# Patient Record
Sex: FEMALE | Race: WHITE | NOT HISPANIC OR LATINO | Employment: OTHER | URBAN - NONMETROPOLITAN AREA
[De-identification: names, ages, dates, MRNs, and addresses within clinical notes are randomized per-mention and may not be internally consistent; named-entity substitution may affect disease eponyms.]

---

## 2018-03-29 ENCOUNTER — TRANSCRIBE ORDERS (OUTPATIENT)
Dept: ADMINISTRATIVE | Facility: HOSPITAL | Age: 63
End: 2018-03-29

## 2018-03-29 ENCOUNTER — HOSPITAL ENCOUNTER (OUTPATIENT)
Dept: RADIOLOGY | Facility: HOSPITAL | Age: 63
Discharge: HOME | End: 2018-03-29
Payer: COMMERCIAL

## 2018-03-29 DIAGNOSIS — R50.9 FEVER, UNSPECIFIED FEVER CAUSE: ICD-10-CM

## 2018-03-29 DIAGNOSIS — R05.9 COUGH: ICD-10-CM

## 2018-03-29 DIAGNOSIS — R50.9 FEVER, UNSPECIFIED FEVER CAUSE: Primary | ICD-10-CM

## 2018-03-29 PROCEDURE — 71046 X-RAY EXAM CHEST 2 VIEWS: CPT

## 2018-08-24 LAB — HCV AB SER-ACNC: NON REACTIVE

## 2019-07-18 DIAGNOSIS — I10 HYPERTENSION, UNSPECIFIED TYPE: Primary | ICD-10-CM

## 2019-07-18 RX ORDER — LISINOPRIL AND HYDROCHLOROTHIAZIDE 20; 12.5 MG/1; MG/1
1 TABLET ORAL DAILY
COMMUNITY
End: 2019-07-18 | Stop reason: SDUPTHER

## 2019-08-20 ENCOUNTER — TELEPHONE (OUTPATIENT)
Dept: FAMILY MEDICINE CLINIC | Facility: CLINIC | Age: 64
End: 2019-08-20

## 2019-08-20 ENCOUNTER — OFFICE VISIT (OUTPATIENT)
Dept: FAMILY MEDICINE CLINIC | Facility: CLINIC | Age: 64
End: 2019-08-20
Payer: COMMERCIAL

## 2019-08-20 VITALS
HEART RATE: 56 BPM | DIASTOLIC BLOOD PRESSURE: 70 MMHG | SYSTOLIC BLOOD PRESSURE: 134 MMHG | HEIGHT: 62 IN | WEIGHT: 138 LBS | BODY MASS INDEX: 25.4 KG/M2 | OXYGEN SATURATION: 98 %

## 2019-08-20 DIAGNOSIS — I10 ESSENTIAL HYPERTENSION, BENIGN: ICD-10-CM

## 2019-08-20 DIAGNOSIS — Z12.39 SCREENING FOR BREAST CANCER: Primary | ICD-10-CM

## 2019-08-20 DIAGNOSIS — E78.5 HYPERLIPIDEMIA, UNSPECIFIED HYPERLIPIDEMIA TYPE: ICD-10-CM

## 2019-08-20 DIAGNOSIS — F41.1 GENERALIZED ANXIETY DISORDER: ICD-10-CM

## 2019-08-20 PROCEDURE — 3075F SYST BP GE 130 - 139MM HG: CPT | Performed by: FAMILY MEDICINE

## 2019-08-20 PROCEDURE — 1036F TOBACCO NON-USER: CPT | Performed by: FAMILY MEDICINE

## 2019-08-20 PROCEDURE — 99213 OFFICE O/P EST LOW 20 MIN: CPT | Performed by: FAMILY MEDICINE

## 2019-08-20 PROCEDURE — 3008F BODY MASS INDEX DOCD: CPT | Performed by: FAMILY MEDICINE

## 2019-08-20 RX ORDER — METOPROLOL TARTRATE 50 MG/1
TABLET, FILM COATED ORAL
COMMUNITY
End: 2020-02-07 | Stop reason: CLARIF

## 2019-08-20 RX ORDER — IBUPROFEN 200 MG
600 CAPSULE ORAL DAILY
COMMUNITY
End: 2020-02-20 | Stop reason: ALTCHOICE

## 2019-08-20 RX ORDER — LORAZEPAM 0.5 MG/1
TABLET ORAL
COMMUNITY
Start: 2016-08-16 | End: 2019-08-20 | Stop reason: SDUPTHER

## 2019-08-20 RX ORDER — LORAZEPAM 0.5 MG/1
0.5 TABLET ORAL EVERY 8 HOURS PRN
Qty: 270 TABLET | Refills: 1 | Status: SHIPPED | OUTPATIENT
Start: 2019-08-20 | End: 2020-08-20 | Stop reason: SDUPTHER

## 2019-08-20 RX ORDER — LISINOPRIL AND HYDROCHLOROTHIAZIDE 25; 20 MG/1; MG/1
TABLET ORAL
COMMUNITY
End: 2020-02-20 | Stop reason: SDUPTHER

## 2019-08-20 RX ORDER — METOPROLOL SUCCINATE 50 MG/1
50 TABLET, EXTENDED RELEASE ORAL DAILY
Refills: 3 | COMMUNITY
Start: 2019-08-14 | End: 2020-02-07 | Stop reason: SDUPTHER

## 2019-08-20 RX ORDER — ASCORBIC ACID 250 MG
500 TABLET ORAL 2 TIMES DAILY
COMMUNITY

## 2019-08-20 RX ORDER — METAPROTERENOL SULFATE 10 MG
1 TABLET ORAL DAILY
COMMUNITY
End: 2021-09-24 | Stop reason: ALTCHOICE

## 2019-08-20 RX ORDER — CALCIUM CITRATE/VITAMIN D3 315MG-6.25
1 TABLET ORAL DAILY
COMMUNITY

## 2019-08-20 NOTE — PROGRESS NOTES
Assessment/Plan:    Essential hypertension, benign  Patient has hypertension  Her blood pressure is well controlled  I recommended no change with her current regiment  Hyperlipidemia  Patient had a fasting lipid panel done  This is currently pending  I will need to contact the patient with these results  Generalized anxiety disorder  Patient has generalized anxiety disorder  I refilled the patient's lorazepam 0 5 mg every 8 hours as needed for anxiety  Diagnoses and all orders for this visit:    Screening for breast cancer  -     Mammo diagnostic bilateral w cad; Future    Hyperlipidemia, unspecified hyperlipidemia type    Essential hypertension, benign    Generalized anxiety disorder  -     LORazepam (ATIVAN) 0 5 mg tablet; Take 1 tablet (0 5 mg total) by mouth every 8 (eight) hours as needed for anxiety    Other orders  -     ascorbic acid (VITAMIN C) 250 MG tablet; Take 500 mg by mouth 2 (two) times a day  -     ASPIRIN 81 PO; Take by mouth  -     Calcium 600 MG tablet; Take by mouth  -     Calcium Citrate-Vitamin D3 315-250 MG-UNIT TABS; Take 1 tablet by mouth daily  -     Multiple Vitamins-Minerals (CENTRUM SILVER 50+WOMEN) TABS; Take by mouth  -     Omega-3 Fatty Acids (OMEGA-3 FISH OIL) 500 MG CAPS; Take 2 g by mouth daily  -     Discontinue: LORazepam (ATIVAN) 0 5 mg tablet  -     metoprolol succinate (TOPROL-XL) 50 mg 24 hr tablet; Take 50 mg by mouth daily  -     metoprolol tartrate (LOPRESSOR) 50 mg tablet; Take by mouth  -     lisinopril-hydrochlorothiazide (PRINZIDE,ZESTORETIC) 20-25 MG per tablet; Take by mouth        The [facility] prescription drug monitoring program was queried  There were no red flags  Safe to proceed  Subjective:      Patient ID: Peter Hollins is a 59 y o  female  This patient is a 71-year-old white female who presents to the office today for her routine checkup  The patient is doing well and has no complaints    She is looking forward to retiring in a year  She reports compliance with her medication  She is exercising regularly  She walks but she also uses an elliptical regularly  She is up-to-date with her colonoscopy  She is due for a mammogram   She had blood work done in anticipation of this visit today  Unfortunately, these results are currently pending  The following portions of the patient's history were reviewed and updated as appropriate: allergies, current medications, past family history, past medical history, past social history, past surgical history and problem list     Review of Systems   Eyes: Negative for visual disturbance  Cardiovascular: Negative for chest pain, palpitations and leg swelling  Gastrointestinal: Negative for abdominal distention, abdominal pain, anal bleeding, blood in stool, constipation, diarrhea and nausea  Objective:      /70   Pulse 56   Ht 5' 1 5" (1 562 m)   Wt 62 6 kg (138 lb)   SpO2 98%   BMI 25 65 kg/m²          Physical Exam   Constitutional: She appears well-developed and well-nourished  No distress  HENT:   Head: Normocephalic and atraumatic  Right Ear: External ear normal    Left Ear: External ear normal    Mouth/Throat: Oropharynx is clear and moist  No oropharyngeal exudate  Tympanic membranes are clear   Eyes: Pupils are equal, round, and reactive to light  Conjunctivae are normal  No scleral icterus  Neck: Neck supple  No tracheal deviation present  No thyromegaly present  Cardiovascular: Normal rate, regular rhythm and normal heart sounds  Exam reveals no gallop and no friction rub  No murmur heard  Pulmonary/Chest: Effort normal and breath sounds normal  No stridor  No respiratory distress  She has no wheezes  She has no rales  Abdominal: Soft  Bowel sounds are normal  She exhibits no distension and no mass  There is no tenderness  There is no rebound and no guarding  Lymphadenopathy:     She has no cervical adenopathy  Skin: She is not diaphoretic  Vitals reviewed  extremities:  Without cyanosis, clubbing, or edema

## 2019-08-20 NOTE — ASSESSMENT & PLAN NOTE
Patient has generalized anxiety disorder  I refilled the patient's lorazepam 0 5 mg every 8 hours as needed for anxiety

## 2019-08-20 NOTE — ASSESSMENT & PLAN NOTE
Patient had a fasting lipid panel done  This is currently pending  I will need to contact the patient with these results

## 2019-08-20 NOTE — TELEPHONE ENCOUNTER
Ani Carver   Phone Number: [phone]             08/20/19 8:50 AM     As a result of outreach to the external facility it was discovered that the patient did not have the requested Hepatitis C  completed/given  We have spoke with the external facility [facility]) and the patient was never seen  This message will now be closed  Thank you   Josselyn Neumann    Previous Messages      ----- Message -----   From: Ruddy Ware MA   Sent: 8/20/2019   8:10 AM EDT   To: [facility] [place]   Subject: anthraciate PC                                   I have reviewed both our old [address] and need your assistance in locating Hepatitis C Screening  Per Patient, testing was done by [facility] on 2018 Thank you

## 2019-08-26 ENCOUNTER — DOCUMENTATION (OUTPATIENT)
Dept: FAMILY MEDICINE CLINIC | Facility: CLINIC | Age: 64
End: 2019-08-26

## 2019-08-26 NOTE — PROGRESS NOTES
BLOOD SUGAR 99 ( NORMAL )  T CHOL 193 (<200 )  TRIGLYCERIDES 125 (<150 )  LDL CHOL 119 (<100 )  THYROID NORMAL  REST OF BLOOD WORK NORMAL    LEFT MESSAGE FOR PT TO CALL OFFICE FOR RESULTS    PT AWARE

## 2019-10-11 RX ORDER — LISINOPRIL AND HYDROCHLOROTHIAZIDE 20; 12.5 MG/1; MG/1
TABLET ORAL
Qty: 135 TABLET | Refills: 3 | Status: SHIPPED | OUTPATIENT
Start: 2019-10-11 | End: 2020-09-30

## 2019-12-19 DIAGNOSIS — R52 PAIN: Primary | ICD-10-CM

## 2019-12-19 RX ORDER — CELECOXIB 200 MG/1
1 CAPSULE ORAL DAILY
COMMUNITY
End: 2019-12-19 | Stop reason: SDUPTHER

## 2019-12-19 RX ORDER — CELECOXIB 200 MG/1
200 CAPSULE ORAL DAILY
Qty: 30 CAPSULE | Refills: 5 | Status: SHIPPED | OUTPATIENT
Start: 2019-12-19 | End: 2021-02-22 | Stop reason: ALTCHOICE

## 2020-02-07 DIAGNOSIS — I10 ESSENTIAL HYPERTENSION, BENIGN: Primary | ICD-10-CM

## 2020-02-07 RX ORDER — METOPROLOL SUCCINATE 50 MG/1
50 TABLET, EXTENDED RELEASE ORAL DAILY
Qty: 90 TABLET | Refills: 3 | Status: SHIPPED | OUTPATIENT
Start: 2020-02-07 | End: 2021-02-01

## 2020-02-20 ENCOUNTER — OFFICE VISIT (OUTPATIENT)
Dept: FAMILY MEDICINE CLINIC | Facility: CLINIC | Age: 65
End: 2020-02-20
Payer: COMMERCIAL

## 2020-02-20 VITALS
DIASTOLIC BLOOD PRESSURE: 78 MMHG | BODY MASS INDEX: 26.13 KG/M2 | OXYGEN SATURATION: 100 % | HEART RATE: 69 BPM | WEIGHT: 138.4 LBS | SYSTOLIC BLOOD PRESSURE: 130 MMHG | HEIGHT: 61 IN

## 2020-02-20 DIAGNOSIS — F41.1 GENERALIZED ANXIETY DISORDER: ICD-10-CM

## 2020-02-20 DIAGNOSIS — Z11.59 ENCOUNTER FOR HEPATITIS C SCREENING TEST FOR LOW RISK PATIENT: ICD-10-CM

## 2020-02-20 DIAGNOSIS — I10 ESSENTIAL HYPERTENSION, BENIGN: Primary | ICD-10-CM

## 2020-02-20 DIAGNOSIS — E78.5 HYPERLIPIDEMIA, UNSPECIFIED HYPERLIPIDEMIA TYPE: ICD-10-CM

## 2020-02-20 PROCEDURE — 3078F DIAST BP <80 MM HG: CPT | Performed by: FAMILY MEDICINE

## 2020-02-20 PROCEDURE — 99213 OFFICE O/P EST LOW 20 MIN: CPT | Performed by: FAMILY MEDICINE

## 2020-02-20 PROCEDURE — 3075F SYST BP GE 130 - 139MM HG: CPT | Performed by: FAMILY MEDICINE

## 2020-02-20 PROCEDURE — 3008F BODY MASS INDEX DOCD: CPT | Performed by: FAMILY MEDICINE

## 2020-02-20 PROCEDURE — 1036F TOBACCO NON-USER: CPT | Performed by: FAMILY MEDICINE

## 2020-02-20 NOTE — PATIENT INSTRUCTIONS
Low-Sodium Diet   AMBULATORY CARE:   A low-sodium diet  limits foods that are high in sodium (salt)  You will need to follow a low-sodium diet if you have high blood pressure, kidney disease, or heart failure  You may also need to follow this diet if you have a condition that is causing your body to retain (hold) extra fluid  You may need to limit the amount of sodium you eat to 1,500 mg  Ask your healthcare provider how much sodium you can have each day  How to use food labels to choose foods that are low in sodium:  Read food labels to find the amount of sodium they contain  The amount of sodium is listed in milligrams (mg)  The % Daily Value (DV) column tells you how much of your daily needs are met by 1 serving of the food for each nutrient listed  Choose foods that have less than 5% of the DV of sodium  These foods are considered low in sodium  Foods that have 20% or more of the DV of sodium are considered high in sodium  Some food labels may also list any of the following terms that tell you about the sodium content in the food:  · Sodium-free:  Less than 5 mg in each serving    · Very low sodium:  35 mg of sodium or less in each serving    · Low sodium:  140 mg of sodium or less in each serving    · Reduced sodium: At least 25% less sodium in each serving than the regular type    · Light in sodium:  50% less sodium in each serving    · Unsalted or no added salt:  No extra salt is added during processing (the food may still contain sodium)  Foods to avoid:  Salty foods are high in sodium   You should avoid the following:  · Processed foods:      ¨ Mixes for cornbread, biscuits, cake, and pudding     ¨ Instant foods, such as potatoes, cereals, noodles, and rice     ¨ Packaged foods, such as bread stuffing, rice and pasta mixes, snack dip mixes, and macaroni and cheese     ¨ Canned foods, such as canned vegetables, soups, broths, sauces, and vegetable or tomato juice    ¨ Snack foods, such as salted chips, popcorn, pretzels, pork rinds, salted crackers, and salted nuts    ¨ Frozen foods, such as dinners, entrees, vegetables with sauces, and breaded meats    ¨ Sauerkraut, pickled vegetables, and other foods prepared in brine    · Meats and cheeses:      ¨ Smoked or cured meat, such as corned beef, jasso, ham, hot dogs, and sausage    ¨ Canned meats or spreads, such as potted meats, sardines, anchovies, and imitation seafood    ¨ Deli or lunch meats, such as bologna, ham, turkey, and roast beef    ¨ Processed cheese, such as American cheese and cheese spreads    · Condiments, sauces, and seasonings:      ¨ Salt (¼ teaspoon of salt contains 575 mg of sodium)    ¨ Seasonings made with salt, such as garlic salt, celery salt, onion salt, and seasoned salt    ¨ Regular soy sauce, barbecue sauce, teriyaki sauce, steak sauce, Worcestershire sauce, and most flavored vinegars    ¨ Canned gravy and mixes     ¨ Regular condiments, such as mustard, ketchup, and salad dressings    ¨ Pickles and olives    ¨ Meat tenderizers and monosodium glutamate (MSG)  Foods to include:  Read the food label to find the exact amount of sodium in each serving  · Bread and cereal:  Try to choose breads with less than 80 mg of sodium per serving  ¨ Bread, roll, margaret, tortilla, or unsalted crackers  ¨ Ready-to-eat cereals with less than 5% DV of sodium (examples include shredded wheat and puffed rice)    ¨ Pasta    · Vegetables and fruits:      ¨ Unsalted fresh, frozen, or canned vegetables    ¨ Fresh, frozen, or canned fruits    ¨ Fruit juice    · Dairy:  One serving has about 150 mg of sodium  ¨ Milk, all types    ¨ Yogurt    ¨ Hard cheese, such as cheddar, Swiss, [employer], or mozzarella    · Meat and other protein foods:  Some raw meats may have added sodium       ¨ Plain meats, fish, and poultry     ¨ Eggs    · Other foods:      ¨ Homemade pudding    ¨ Unsalted nuts, popcorn, or pretzels    ¨ Unsalted butter or margarine  Ways to decrease sodium:   · Add spices and herbs to foods instead of salt during cooking  Use salt-free seasonings to add flavor to foods  Examples include onion powder, garlic powder, basil, tang powder, paprika, and parsley  Try lemon or lime juice or vinegar to give foods a tart flavor  Use hot peppers, pepper, or cayenne pepper to add a spicy flavor to foods  · Do not keep a salt shaker at your kitchen table  This may help keep you from adding salt to food at the table  It may take time to get used to enjoying the natural flavor of food instead of adding salt  Talk to your healthcare provider before you use salt substitutes  Some salt substitutes have a high amount of potassium and need to be avoided if you have kidney disease  · Choose low-sodium foods at restaurants  Meals from restaurants are often high in sodium  Some restaurants have nutrition information on the menu that tells you the amount of sodium in their foods  If possible, ask for your food to be prepared with less, or no salt  · Shop for unsalted or low-sodium foods and snacks at the grocery store  Examples include unsalted or low-sodium broths, soups, and canned vegetables  Choose fresh or frozen vegetables instead  Choose unsalted nuts or seeds or fresh fruits or vegetables as snacks  Read food labels and choose salt-free, very low-sodium, or low-sodium foods  © 2017 [address] [employer] Information is for End User's use only and may not be sold, redistributed or otherwise used for commercial purposes  All illustrations and images included in CareNotes® are the copyrighted property of A D A M [employer] Inc  or Leonel Oakes  The above information is an  only  It is not intended as medical advice for individual conditions or treatments  Talk to your doctor, nurse or pharmacist before following any medical regimen to see if it is safe and effective for you

## 2020-02-20 NOTE — PROGRESS NOTES
Assessment/Plan:    Essential hypertension, benign  Patient's blood pressure is well controlled on her current regiment  I recommended no change with her current medication  She did discuss perhaps changing to metoprolol tartrate at some point in the future because it is cheaper  She thinks maybe after she retires  Apparently, a pharmacist had discuss this with her and told her to discuss it with me  She tells me she does not want to make any changes now because she can't afford the Toprol XL and she finds it works well for her  I ordered a BMP    Hyperlipidemia  Patient has hyperlipidemia  I reviewed her last lipid panel with her  I plan a repeat lipid panel in 6 months    Generalized anxiety disorder  Currently stable on lorazepam as needed       Diagnoses and all orders for this visit:    Essential hypertension, benign  -     Basic metabolic panel; Future    Encounter for hepatitis C screening test for low risk patient  -     Cancel: Hepatitis C antibody; Future    Hyperlipidemia, unspecified hyperlipidemia type    Generalized anxiety disorder          Subjective:      Patient ID: Onesimo Salinas is a 62 y o  female  This is a 61-year-old white female who presents to the office today for her routine checkup  The patient is doing well and has no complaints  She is exercising every morning  She tries to watch her diet  She is taking her medication as prescribed  She feels well in general       The following portions of the patient's history were reviewed and updated as appropriate: allergies, current medications, past family history, past medical history, past social history, past surgical history and problem list     Review of Systems   Respiratory: Negative for cough, shortness of breath and wheezing  Cardiovascular: Negative for chest pain, palpitations and leg swelling     Gastrointestinal: Negative for abdominal distention, abdominal pain, blood in stool, constipation, diarrhea, nausea and vomiting  Musculoskeletal: Negative for arthralgias and gait problem  Objective:      /78 (BP Location: Left arm, Patient Position: Sitting, Cuff Size: Adult)   Pulse 69   Ht 5' 1" (1 549 m)   Wt 62 8 kg (138 lb 6 4 oz)   SpO2 100%   BMI 26 15 kg/m²          Physical Exam   Constitutional: She appears well-developed and well-nourished  No distress  HENT:   Head: Normocephalic and atraumatic  Right Ear: External ear normal    Left Ear: External ear normal    Mouth/Throat: Oropharynx is clear and moist  No oropharyngeal exudate  Tympanic membranes are normal in appearance   Eyes: Pupils are equal, round, and reactive to light  Conjunctivae are normal  No scleral icterus  Neck: Neck supple  No tracheal deviation present  No thyromegaly present  No carotid bruits are appreciated   Cardiovascular: Normal rate, regular rhythm and normal heart sounds  Exam reveals no gallop and no friction rub  No murmur heard  Pulmonary/Chest: Effort normal and breath sounds normal  No stridor  No respiratory distress  She has no wheezes  She has no rales  Abdominal: Soft  Bowel sounds are normal  She exhibits no distension and no mass  There is no tenderness  There is no guarding  There was no organomegaly noted   Lymphadenopathy:     She has no cervical adenopathy  Psychiatric: She has a normal mood and affect  Her behavior is normal  Judgment and thought content normal    Vitals reviewed      extremities:  Without cyanosis, clubbing, or edema

## 2020-02-21 NOTE — ASSESSMENT & PLAN NOTE
Patient's blood pressure is well controlled on her current regiment  I recommended no change with her current medication  She did discuss perhaps changing to metoprolol tartrate at some point in the future because it is cheaper  She thinks maybe after she retires  Apparently, a pharmacist had discuss this with her and told her to discuss it with me  She tells me she does not want to make any changes now because she can't afford the Toprol XL and she finds it works well for her    I ordered a BMP

## 2020-02-21 NOTE — ASSESSMENT & PLAN NOTE
Patient has hyperlipidemia  I reviewed her last lipid panel with her    I plan a repeat lipid panel in 6 months

## 2020-02-24 ENCOUNTER — TELEPHONE (OUTPATIENT)
Dept: FAMILY MEDICINE CLINIC | Facility: CLINIC | Age: 65
End: 2020-02-24

## 2020-02-24 NOTE — TELEPHONE ENCOUNTER
Pt has lab work (BMP) wants to know if you want more drawn ex  Lipid etc   Please advise,  We need to print out the script for her

## 2020-08-19 LAB — HCV AB SER-ACNC: NON REACTIVE

## 2020-08-20 ENCOUNTER — OFFICE VISIT (OUTPATIENT)
Dept: FAMILY MEDICINE CLINIC | Facility: CLINIC | Age: 65
End: 2020-08-20
Payer: COMMERCIAL

## 2020-08-20 VITALS
WEIGHT: 138.4 LBS | DIASTOLIC BLOOD PRESSURE: 78 MMHG | HEIGHT: 61 IN | BODY MASS INDEX: 26.13 KG/M2 | SYSTOLIC BLOOD PRESSURE: 138 MMHG | TEMPERATURE: 98.5 F | HEART RATE: 61 BPM | OXYGEN SATURATION: 98 %

## 2020-08-20 DIAGNOSIS — I10 ESSENTIAL HYPERTENSION, BENIGN: ICD-10-CM

## 2020-08-20 DIAGNOSIS — E78.5 HYPERLIPIDEMIA, UNSPECIFIED HYPERLIPIDEMIA TYPE: ICD-10-CM

## 2020-08-20 DIAGNOSIS — Z23 IMMUNIZATION DUE: ICD-10-CM

## 2020-08-20 DIAGNOSIS — Z00.00 ANNUAL PHYSICAL EXAM: Primary | ICD-10-CM

## 2020-08-20 DIAGNOSIS — R10.13 EPIGASTRIC PAIN: ICD-10-CM

## 2020-08-20 DIAGNOSIS — H00.14 CHALAZION OF LEFT UPPER EYELID: ICD-10-CM

## 2020-08-20 DIAGNOSIS — F41.1 GENERALIZED ANXIETY DISORDER: ICD-10-CM

## 2020-08-20 PROCEDURE — 1036F TOBACCO NON-USER: CPT | Performed by: FAMILY MEDICINE

## 2020-08-20 PROCEDURE — 99396 PREV VISIT EST AGE 40-64: CPT | Performed by: FAMILY MEDICINE

## 2020-08-20 PROCEDURE — 90750 HZV VACC RECOMBINANT IM: CPT | Performed by: FAMILY MEDICINE

## 2020-08-20 PROCEDURE — 90471 IMMUNIZATION ADMIN: CPT | Performed by: FAMILY MEDICINE

## 2020-08-20 PROCEDURE — 3075F SYST BP GE 130 - 139MM HG: CPT | Performed by: FAMILY MEDICINE

## 2020-08-20 PROCEDURE — 3008F BODY MASS INDEX DOCD: CPT | Performed by: FAMILY MEDICINE

## 2020-08-20 PROCEDURE — 3725F SCREEN DEPRESSION PERFORMED: CPT | Performed by: FAMILY MEDICINE

## 2020-08-20 PROCEDURE — 3078F DIAST BP <80 MM HG: CPT | Performed by: FAMILY MEDICINE

## 2020-08-20 RX ORDER — LORAZEPAM 0.5 MG/1
0.5 TABLET ORAL EVERY 8 HOURS PRN
Qty: 270 TABLET | Refills: 1 | Status: SHIPPED | OUTPATIENT
Start: 2020-08-20 | End: 2021-02-17

## 2020-08-20 NOTE — PATIENT INSTRUCTIONS
Low-Sodium Diet   AMBULATORY CARE:   A low-sodium diet  limits foods that are high in sodium (salt)  You will need to follow a low-sodium diet if you have high blood pressure, kidney disease, or heart failure  You may also need to follow this diet if you have a condition that is causing your body to retain (hold) extra fluid  You may need to limit the amount of sodium you eat to 1,500 mg  Ask your healthcare provider how much sodium you can have each day  How to use food labels to choose foods that are low in sodium:  Read food labels to find the amount of sodium they contain  The amount of sodium is listed in milligrams (mg)  The % Daily Value (DV) column tells you how much of your daily needs are met by 1 serving of the food for each nutrient listed  Choose foods that have less than 5% of the DV of sodium  These foods are considered low in sodium  Foods that have 20% or more of the DV of sodium are considered high in sodium  Some food labels may also list any of the following terms that tell you about the sodium content in the food:  · Sodium-free:  Less than 5 mg in each serving    · Very low sodium:  35 mg of sodium or less in each serving    · Low sodium:  140 mg of sodium or less in each serving    · Reduced sodium: At least 25% less sodium in each serving than the regular type    · Light in sodium:  50% less sodium in each serving    · Unsalted or no added salt:  No extra salt is added during processing (the food may still contain sodium)  Foods to avoid:  Salty foods are high in sodium   You should avoid the following:  · Processed foods:      ¨ Mixes for cornbread, biscuits, cake, and pudding     ¨ Instant foods, such as potatoes, cereals, noodles, and rice     ¨ Packaged foods, such as bread stuffing, rice and pasta mixes, snack dip mixes, and macaroni and cheese     ¨ Canned foods, such as canned vegetables, soups, broths, sauces, and vegetable or tomato juice    ¨ Snack foods, such as salted chips, popcorn, pretzels, pork rinds, salted crackers, and salted nuts    ¨ Frozen foods, such as dinners, entrees, vegetables with sauces, and breaded meats    ¨ Sauerkraut, pickled vegetables, and other foods prepared in brine    · Meats and cheeses:      ¨ Smoked or cured meat, such as corned beef, jasso, ham, hot dogs, and sausage    ¨ Canned meats or spreads, such as potted meats, sardines, anchovies, and imitation seafood    ¨ Deli or lunch meats, such as bologna, ham, turkey, and roast beef    ¨ Processed cheese, such as American cheese and cheese spreads    · Condiments, sauces, and seasonings:      ¨ Salt (¼ teaspoon of salt contains 575 mg of sodium)    ¨ Seasonings made with salt, such as garlic salt, celery salt, onion salt, and seasoned salt    ¨ Regular soy sauce, barbecue sauce, teriyaki sauce, steak sauce, Worcestershire sauce, and most flavored vinegars    ¨ Canned gravy and mixes     ¨ Regular condiments, such as mustard, ketchup, and salad dressings    ¨ Pickles and olives    ¨ Meat tenderizers and monosodium glutamate (MSG)  Foods to include:  Read the food label to find the exact amount of sodium in each serving  · Bread and cereal:  Try to choose breads with less than 80 mg of sodium per serving  ¨ Bread, roll, margaret, tortilla, or unsalted crackers  ¨ Ready-to-eat cereals with less than 5% DV of sodium (examples include shredded wheat and puffed rice)    ¨ Pasta    · Vegetables and fruits:      ¨ Unsalted fresh, frozen, or canned vegetables    ¨ Fresh, frozen, or canned fruits    ¨ Fruit juice    · Dairy:  One serving has about 150 mg of sodium  ¨ Milk, all types    ¨ Yogurt    ¨ Hard cheese, such as cheddar, Swiss, [employer], or mozzarella    · Meat and other protein foods:  Some raw meats may have added sodium       ¨ Plain meats, fish, and poultry     ¨ Eggs    · Other foods:      ¨ Homemade pudding    ¨ Unsalted nuts, popcorn, or pretzels    ¨ Unsalted butter or margarine  Ways to decrease sodium:   · Add spices and herbs to foods instead of salt during cooking  Use salt-free seasonings to add flavor to foods  Examples include onion powder, garlic powder, basil, tang powder, paprika, and parsley  Try lemon or lime juice or vinegar to give foods a tart flavor  Use hot peppers, pepper, or cayenne pepper to add a spicy flavor to foods  · Do not keep a salt shaker at your kitchen table  This may help keep you from adding salt to food at the table  It may take time to get used to enjoying the natural flavor of food instead of adding salt  Talk to your healthcare provider before you use salt substitutes  Some salt substitutes have a high amount of potassium and need to be avoided if you have kidney disease  · Choose low-sodium foods at restaurants  Meals from restaurants are often high in sodium  Some restaurants have nutrition information on the menu that tells you the amount of sodium in their foods  If possible, ask for your food to be prepared with less, or no salt  · Shop for unsalted or low-sodium foods and snacks at the grocery store  Examples include unsalted or low-sodium broths, soups, and canned vegetables  Choose fresh or frozen vegetables instead  Choose unsalted nuts or seeds or fresh fruits or vegetables as snacks  Read food labels and choose salt-free, very low-sodium, or low-sodium foods  © 2017 [address] [employer] Information is for End User's use only and may not be sold, redistributed or otherwise used for commercial purposes  All illustrations and images included in CareNotes® are the copyrighted property of [employer]  or Leonel Oakes  The above information is an  only  It is not intended as medical advice for individual conditions or treatments  Talk to your doctor, nurse or pharmacist before following any medical regimen to see if it is safe and effective for you

## 2020-08-20 NOTE — PROGRESS NOTES
Assessment/Plan:    Annual physical exam  Patient presented to the office today for her annual physical   The patient had blood work done just yesterday  I do not have her results yet  I told her I will contact her with the results when they become available  If she does not hear from me within a week, she should call me  She had done through [facility]  We have had issues getting are results from them in the past   Patient's blood pressure is reasonably well controlled  I recommended no change with her medication  The stye on her left eye is resolving  I have simply recommended she apply warm compresses to the affected area and it should completely resolved  It was almost not noticeable at this time  It started a week ago  I am concerned about the postprandial abdominal pain  It would be very unusual to start getting heartburn at 62years old  I am concerned about her gallbladder  I ordered an ultrasound of the abdomen to further evaluate this  Patient is going to get Shingrix vaccine today  She will have her 2nd dose when she returns in 6 months  Diagnoses and all orders for this visit:    Annual physical exam    Generalized anxiety disorder  -     LORazepam (ATIVAN) 0 5 mg tablet; Take 1 tablet (0 5 mg total) by mouth every 8 (eight) hours as needed for anxiety    Epigastric pain  -     US abdomen complete; Future    Chalazion of left upper eyelid    Essential hypertension, benign    Hyperlipidemia, unspecified hyperlipidemia type        The [place] prescription drug monitoring program was queried  There were no red flags  Safe to proceed  BMI Counseling: Body mass index is 26 15 kg/m²  The BMI is above normal  Nutrition recommendations include reducing portion sizes, decreasing overall calorie intake, 3-5 servings of fruits/vegetables daily, reducing fast food intake, consuming healthier snacks, decreasing soda and/or juice intake and moderation in carbohydrate intake   Exercise recommendations include exercising 3-5 times per week  Subjective:      Patient ID: Imer Johnson is a 62 y o  female  This 73-year-old white female presents to the office today for her annual physical   The patient is doing well and has no complaints  The patient is taking her medication as prescribed  She is retired now  She is walking 5 miles per day  In addition to this, she is using it an elliptical machine  She finds that her knee feels better now since she is able to do this exercise  Her orthopedic doctor believes this is secondary to quadriceps strengthening  The following portions of the patient's history were reviewed and updated as appropriate: allergies, current medications, past family history, past medical history, past social history, past surgical history and problem list     Review of Systems   Constitutional: Negative for activity change, appetite change and unexpected weight change  Eyes: Positive for pain (Reports a stye on the left upper eyelid)  Cardiovascular: Negative for chest pain, palpitations and leg swelling  Gastrointestinal: Positive for abdominal distention ( reports frequent belching with her abdominal pain) and abdominal pain ( reports postprandial abdominal pain  She tells me that this only started earlier this summer  She attributed to getting penicillin from the dentist   When she gets the pain, it actually last 2 or 3 days  She believed to be heartburn but it would not re)  Negative for constipation, diarrhea, nausea and vomiting  Objective:      /78 (BP Location: Left arm, Patient Position: Sitting, Cuff Size: Adult)   Pulse 61   Temp 98 5 °F (36 9 °C) (Temporal)   Ht 5' 1" (1 549 m)   Wt 62 8 kg (138 lb 6 4 oz)   SpO2 98%   BMI 26 15 kg/m²          Physical Exam  Vitals signs reviewed  HENT:      Head: Normocephalic and atraumatic        Right Ear: Tympanic membrane, ear canal and external ear normal       Left Ear: Tympanic membrane, ear canal and external ear normal       Mouth/Throat:      Mouth: Mucous membranes are moist       Pharynx: Oropharynx is clear  No oropharyngeal exudate or posterior oropharyngeal erythema  Eyes:      General: No scleral icterus  Right eye: No discharge  Left eye: No discharge  Conjunctiva/sclera: Conjunctivae normal       Pupils: Pupils are equal, round, and reactive to light  Comments: There is a resolving chalazion present on the left upper eyelid   Neck:      Musculoskeletal: Neck supple  Vascular: No carotid bruit  Comments: There is no thyromegaly noted  Cardiovascular:      Rate and Rhythm: Normal rate and regular rhythm  Heart sounds: Normal heart sounds  No murmur  No friction rub  No gallop  Pulmonary:      Effort: Pulmonary effort is normal  No respiratory distress  Breath sounds: Normal breath sounds  No stridor  No wheezing, rhonchi or rales  Abdominal:      General: Abdomen is flat  Bowel sounds are normal  There is no distension  Palpations: Abdomen is soft  There is no mass  Tenderness: There is no abdominal tenderness  There is no guarding  Lymphadenopathy:      Cervical: No cervical adenopathy  Psychiatric:         Mood and Affect: Mood normal          Behavior: Behavior normal          Thought Content:  Thought content normal          Judgment: Judgment normal        extremities:  Without cyanosis, clubbing, or edema

## 2020-08-20 NOTE — ASSESSMENT & PLAN NOTE
Patient presented to the office today for her annual physical   The patient had blood work done just yesterday  I do not have her results yet  I told her I will contact her with the results when they become available  If she does not hear from me within a week, she should call me  She had done through [facility]  We have had issues getting are results from them in the past   Patient's blood pressure is reasonably well controlled  I recommended no change with her medication  The stye on her left eye is resolving  I have simply recommended she apply warm compresses to the affected area and it should completely resolved  It was almost not noticeable at this time  It started a week ago  I am concerned about the postprandial abdominal pain  It would be very unusual to start getting heartburn at 62years old  I am concerned about her gallbladder  I ordered an ultrasound of the abdomen to further evaluate this  Patient is going to get Shingrix vaccine today  She will have her 2nd dose when she returns in 6 months

## 2020-08-26 ENCOUNTER — HOSPITAL ENCOUNTER (OUTPATIENT)
Dept: ULTRASOUND IMAGING | Facility: HOSPITAL | Age: 65
Discharge: HOME | End: 2020-08-26
Attending: FAMILY MEDICINE
Payer: COMMERCIAL

## 2020-08-26 DIAGNOSIS — R10.13 EPIGASTRIC PAIN: ICD-10-CM

## 2020-08-26 PROCEDURE — 76700 US EXAM ABDOM COMPLETE: CPT

## 2020-09-30 DIAGNOSIS — I10 HYPERTENSION, UNSPECIFIED TYPE: ICD-10-CM

## 2020-09-30 RX ORDER — LISINOPRIL AND HYDROCHLOROTHIAZIDE 20; 12.5 MG/1; MG/1
TABLET ORAL
Qty: 135 TABLET | Refills: 3 | Status: SHIPPED | OUTPATIENT
Start: 2020-09-30 | End: 2021-10-07 | Stop reason: SDUPTHER

## 2020-12-28 NOTE — ASSESSMENT & PLAN NOTE
Patient has hypertension  Her blood pressure is well controlled  I recommended no change with her current regiment  Donor Site Anesthesia Type: same as repair anesthesia

## 2021-01-31 DIAGNOSIS — I10 ESSENTIAL HYPERTENSION, BENIGN: ICD-10-CM

## 2021-02-01 RX ORDER — METOPROLOL SUCCINATE 50 MG/1
TABLET, EXTENDED RELEASE ORAL
Qty: 90 TABLET | Refills: 3 | Status: SHIPPED | OUTPATIENT
Start: 2021-02-01 | End: 2022-02-07 | Stop reason: SDUPTHER

## 2021-02-17 DIAGNOSIS — F41.1 GENERALIZED ANXIETY DISORDER: ICD-10-CM

## 2021-02-17 RX ORDER — LORAZEPAM 0.5 MG/1
0.5 TABLET ORAL EVERY 8 HOURS PRN
Qty: 270 TABLET | Refills: 1 | Status: SHIPPED | OUTPATIENT
Start: 2021-02-17 | End: 2022-03-25 | Stop reason: SDUPTHER

## 2021-02-17 NOTE — PROGRESS NOTES
The [place] prescription drug monitoring program was queried  There were no red flags  Safe to proceed

## 2021-02-22 ENCOUNTER — OFFICE VISIT (OUTPATIENT)
Dept: FAMILY MEDICINE CLINIC | Facility: CLINIC | Age: 66
End: 2021-02-22
Payer: COMMERCIAL

## 2021-02-22 VITALS
BODY MASS INDEX: 26.38 KG/M2 | WEIGHT: 139.7 LBS | HEIGHT: 61 IN | SYSTOLIC BLOOD PRESSURE: 150 MMHG | OXYGEN SATURATION: 97 % | HEART RATE: 63 BPM | DIASTOLIC BLOOD PRESSURE: 86 MMHG | TEMPERATURE: 98.4 F

## 2021-02-22 DIAGNOSIS — E78.5 HYPERLIPIDEMIA, UNSPECIFIED HYPERLIPIDEMIA TYPE: ICD-10-CM

## 2021-02-22 DIAGNOSIS — I10 ESSENTIAL HYPERTENSION, BENIGN: Primary | ICD-10-CM

## 2021-02-22 DIAGNOSIS — F41.1 GENERALIZED ANXIETY DISORDER: ICD-10-CM

## 2021-02-22 DIAGNOSIS — Z23 IMMUNIZATION DUE: Primary | ICD-10-CM

## 2021-02-22 PROCEDURE — 99213 OFFICE O/P EST LOW 20 MIN: CPT | Performed by: FAMILY MEDICINE

## 2021-02-22 PROCEDURE — 90471 IMMUNIZATION ADMIN: CPT

## 2021-02-22 PROCEDURE — 90750 HZV VACC RECOMBINANT IM: CPT

## 2021-02-22 RX ORDER — AMLODIPINE BESYLATE 5 MG/1
5 TABLET ORAL DAILY
Qty: 30 TABLET | Refills: 5 | Status: SHIPPED | OUTPATIENT
Start: 2021-02-22 | End: 2021-08-18 | Stop reason: SDUPTHER

## 2021-02-22 NOTE — PROGRESS NOTES
Assessment/Plan:    Essential hypertension, benign    Patient has hypertension which is not well controlled  I recheck her blood pressure myself and found to be 150/86  I started the patient on amlodipine 5 mg daily  She will continue the lisinopril -hydrochlorothiazide, dose unchanged for now  If blood pressure is really well controlled next time, perhaps I can discontinue the evening dose  Patient will also continue her beta-blocker therapy  I scheduled the patient a follow-up visit for 4 weeks to reassess the blood pressure  Continue exercise  Continue sodium restricted diet  Hyperlipidemia    Patient has hyperlipidemia  I reviewed the CBC and CMP she had done for her gastroenterologist   I ordered a fasting lipid panel    Generalized anxiety disorder   Patient has generalized anxiety disorder which is stable  She will continue lorazepam as needed       Diagnoses and all orders for this visit:    Essential hypertension, benign  -     amLODIPine (NORVASC) 5 mg tablet; Take 1 tablet (5 mg total) by mouth daily  -     TSH, 3rd generation with Free T4 reflex; Future    Hyperlipidemia, unspecified hyperlipidemia type  -     Lipid panel; Future    Generalized anxiety disorder  -     TSH, 3rd generation with Free T4 reflex; Future          Subjective:      Patient ID: Milagros Rowley is a 60 y o  female  This is a 81-year-old white female presents to the office today for her routine checkup  The patient does check her blood pressures at home  Generally, she gets much lower blood pressure readings at home that she does in the office  She tells me lately, her blood pressure readings have been high at home as well  She was worried about this  It was also high recently when she saw her gastroenterologist   She tells me when she 1st went air was 140/70 but then it kept going up  She tells me she has a family history of hypertension and her mother's blood pressure behaved precisely the same way    The patient exercises regularly and has been watching her sodium intake  The following portions of the patient's history were reviewed and updated as appropriate: allergies, current medications, past family history, past medical history, past social history, past surgical history and problem list     Review of Systems   Respiratory: Negative for cough, shortness of breath and wheezing  Cardiovascular: Negative for chest pain, palpitations and leg swelling  Gastrointestinal: Negative for abdominal distention, abdominal pain, blood in stool, constipation, diarrhea and nausea  Objective:      /86   Pulse 63   Temp 98 4 °F (36 9 °C) (Tympanic)   Ht 5' 1" (1 549 m)   Wt 63 4 kg (139 lb 11 2 oz)   SpO2 97%   BMI 26 40 kg/m²          Physical Exam  Vitals signs reviewed  Constitutional:       Comments: This is a 80-year-old white female who appears her stated age  She is in no apparent distress   HENT:      Head: Normocephalic and atraumatic  Right Ear: Tympanic membrane, ear canal and external ear normal       Left Ear: Tympanic membrane, ear canal and external ear normal       Mouth/Throat:      Mouth: Mucous membranes are moist       Pharynx: Oropharynx is clear  No oropharyngeal exudate or posterior oropharyngeal erythema  Eyes:      General: No scleral icterus  Right eye: No discharge  Left eye: No discharge  Conjunctiva/sclera: Conjunctivae normal       Pupils: Pupils are equal, round, and reactive to light  Neck:      Musculoskeletal: Neck supple  Vascular: No carotid bruit  Comments: There was no thyromegaly noted  Cardiovascular:      Rate and Rhythm: Normal rate and regular rhythm  Heart sounds: Normal heart sounds  No murmur  No friction rub  No gallop  Pulmonary:      Effort: Pulmonary effort is normal  No respiratory distress  Breath sounds: Normal breath sounds  No stridor  No wheezing, rhonchi or rales     Abdominal: General: Bowel sounds are normal  There is no distension  Palpations: Abdomen is soft  There is no mass  Tenderness: There is no abdominal tenderness  There is no guarding  Comments: No abdominal bruits  There was no hepatosplenomegaly   Lymphadenopathy:      Cervical: No cervical adenopathy  Psychiatric:         Mood and Affect: Mood normal          Behavior: Behavior normal          Thought Content:  Thought content normal          Judgment: Judgment normal

## 2021-02-22 NOTE — ASSESSMENT & PLAN NOTE
Patient has hypertension which is not well controlled  I recheck her blood pressure myself and found to be 150/86  I started the patient on amlodipine 5 mg daily  She will continue the lisinopril -hydrochlorothiazide, dose unchanged for now  If blood pressure is really well controlled next time, perhaps I can discontinue the evening dose  Patient will also continue her beta-blocker therapy  I scheduled the patient a follow-up visit for 4 weeks to reassess the blood pressure  Continue exercise  Continue sodium restricted diet

## 2021-02-22 NOTE — PATIENT INSTRUCTIONS
Low-Sodium Diet   AMBULATORY CARE:   A low-sodium diet  limits foods that are high in sodium (salt)  You will need to follow a low-sodium diet if you have high blood pressure, kidney disease, or heart failure  You may also need to follow this diet if you have a condition that is causing your body to retain (hold) extra fluid  You may need to limit the amount of sodium you eat in a day to 1,500 to 2,000 mg  Ask your healthcare provider how much sodium you can have each day  How to use food labels to choose foods that are low in sodium:  Read food labels to find the amount of sodium they contain  The amount of sodium is listed in milligrams (mg)  The % Daily Value (DV) column tells you how much of your daily needs are met by 1 serving of the food for each nutrient listed  Choose foods that have less than 5% of the DV of sodium  These foods are considered low in sodium  Foods that have 20% or more of the DV of sodium are considered high in sodium  Some food labels may also list any of the following terms that tell you about the sodium content in the food:  · Sodium-free:  Less than 5 mg in each serving    · Very low sodium:  35 mg of sodium or less in each serving    · Low sodium:  140 mg of sodium or less in each serving    · Reduced sodium: At least 25% less sodium in each serving than the regular type    · Light in sodium:  50% less sodium in each serving    · Unsalted or no added salt:  No extra salt is added during processing (the food may still contain sodium)     Foods to avoid:  Salty foods are high in sodium  You should avoid the following:  · Processed foods:      ? Mixes for cornbread, biscuits, cake, and pudding     ? Instant foods, such as potatoes, cereals, noodles, and rice     ? Packaged foods, such as bread stuffing, rice and pasta mixes, snack dip mixes, and macaroni and cheese     ? Canned foods, such as canned vegetables, soups, broths, sauces, and vegetable or tomato juice    ?  Snack foods, such as salted chips, popcorn, pretzels, pork rinds, salted crackers, and salted nuts    ? Frozen foods, such as dinners, entrees, vegetables with sauces, and breaded meats    ? Sauerkraut, pickled vegetables, and other foods prepared in brine    · Meats and cheeses:      ? Smoked or cured meat, such as corned beef, jasso, ham, hot dogs, and sausage    ? Canned meats or spreads, such as potted meats, sardines, anchovies, and imitation seafood    ? Deli or lunch meats, such as bologna, ham, turkey, and roast beef    ? Processed cheese, such as American cheese and cheese spreads    · Condiments, sauces, and seasonings:      ? Salt (¼ teaspoon of salt contains 575 mg of sodium)    ? Seasonings made with salt, such as garlic salt, celery salt, onion salt, and seasoned salt    ? Regular soy sauce, barbecue sauce, teriyaki sauce, steak sauce, Worcestershire sauce, and most flavored vinegars    ? Canned gravy and mixes     ? Regular condiments, such as mustard, ketchup, and salad dressings    ? Pickles and olives    ? Meat tenderizers and monosodium glutamate (MSG)    Foods to include:  Read the food label to find the exact amount of sodium in each serving  · Bread and cereal:  Try to choose breads with less than 80 mg of sodium per serving  ? Bread, roll, margaret, tortilla, or unsalted crackers  ? Ready-to-eat cereals with less than 5% DV of sodium (examples include shredded wheat and puffed rice)    ? Pasta    · Vegetables and fruits:      ? Unsalted fresh, frozen, or canned vegetables    ? Fresh, frozen, or canned fruits    ? Fruit juice    · Dairy:  One serving has about 150 mg of sodium  ? Milk, all types    ? Yogurt    ? Hard cheese, such as cheddar, Swiss, [employer], or mozzarella    · Meat and other protein foods:  Some raw meats may have added sodium  ? Plain meats, fish, and poultry     ? Eggs    · Other foods:      ? Homemade pudding    ? Unsalted nuts, popcorn, or pretzels    ?  Unsalted butter or margarine    Ways to decrease sodium:   · Add spices and herbs to foods instead of salt during cooking  Use salt-free seasonings to add flavor to foods  Examples include onion powder, garlic powder, basil, tang powder, paprika, and parsley  Try lemon or lime juice or vinegar to give foods a tart flavor  Use hot peppers, pepper, or cayenne pepper to add a spicy flavor  · Do not keep a salt shaker at your kitchen table  This may help keep you from adding salt to food at the table  A teaspoon of salt has 2,300 mg of sodium  It may take time to get used to enjoying the natural flavor of food instead of adding salt  Talk to your healthcare provider before you use salt substitutes  Some salt substitutes have a high amount of potassium and need to be avoided if you have kidney disease  · Choose low-sodium foods at restaurants  Meals from restaurants are often high in sodium  Some restaurants have nutrition information on the menu that tells you the amount of sodium in their foods  If possible, ask for your food to be prepared with less, or no salt  · Shop for unsalted or low-sodium foods and snacks at the grocery store  Examples include unsalted or low-sodium broths, soups, and canned vegetables  Choose fresh or frozen vegetables instead  Choose unsalted nuts or seeds or fresh fruits or vegetables as snacks  Read food labels and choose salt-free, very low-sodium, or low-sodium foods  © Copyright [address] Information is for End User's use only and may not be sold, redistributed or otherwise used for commercial purposes  All illustrations and images included in CareNotes® are the copyrighted property of [employer]  Inc  or [facility] Sean Gurrola   The above information is an  only  It is not intended as medical advice for individual conditions or treatments  Talk to your doctor, nurse or pharmacist before following any medical regimen to see if it is safe and effective for you

## 2021-02-23 NOTE — ASSESSMENT & PLAN NOTE
Patient has hyperlipidemia    I reviewed the CBC and CMP she had done for her gastroenterologist   I ordered a fasting lipid panel

## 2021-03-20 ENCOUNTER — APPOINTMENT (OUTPATIENT)
Dept: LAB | Facility: HOSPITAL | Age: 66
End: 2021-03-20
Attending: FAMILY MEDICINE
Payer: COMMERCIAL

## 2021-03-20 DIAGNOSIS — F41.1 GENERALIZED ANXIETY DISORDER: ICD-10-CM

## 2021-03-20 DIAGNOSIS — E78.5 HYPERLIPIDEMIA, UNSPECIFIED HYPERLIPIDEMIA TYPE: ICD-10-CM

## 2021-03-20 DIAGNOSIS — I10 ESSENTIAL HYPERTENSION, BENIGN: ICD-10-CM

## 2021-03-20 LAB
CHOLEST SERPL-MCNC: 235 MG/DL (ref 50–200)
HDLC SERPL-MCNC: 54 MG/DL
LDLC SERPL CALC-MCNC: 158 MG/DL (ref 0–100)
NONHDLC SERPL-MCNC: 181 MG/DL
TRIGL SERPL-MCNC: 116 MG/DL
TSH SERPL DL<=0.05 MIU/L-ACNC: 1.62 UIU/ML (ref 0.36–3.74)

## 2021-03-20 PROCEDURE — 80061 LIPID PANEL: CPT

## 2021-03-20 PROCEDURE — 36415 COLL VENOUS BLD VENIPUNCTURE: CPT

## 2021-03-20 PROCEDURE — 84443 ASSAY THYROID STIM HORMONE: CPT

## 2021-03-22 ENCOUNTER — OFFICE VISIT (OUTPATIENT)
Dept: FAMILY MEDICINE CLINIC | Facility: CLINIC | Age: 66
End: 2021-03-22
Payer: COMMERCIAL

## 2021-03-22 VITALS
BODY MASS INDEX: 26.41 KG/M2 | TEMPERATURE: 98.5 F | HEART RATE: 66 BPM | SYSTOLIC BLOOD PRESSURE: 130 MMHG | OXYGEN SATURATION: 99 % | HEIGHT: 61 IN | WEIGHT: 139.9 LBS | DIASTOLIC BLOOD PRESSURE: 76 MMHG

## 2021-03-22 DIAGNOSIS — I10 ESSENTIAL HYPERTENSION, BENIGN: Primary | ICD-10-CM

## 2021-03-22 DIAGNOSIS — E78.00 PURE HYPERCHOLESTEROLEMIA: ICD-10-CM

## 2021-03-22 DIAGNOSIS — Z79.899 ENCOUNTER FOR LONG-TERM (CURRENT) USE OF MEDICATIONS: ICD-10-CM

## 2021-03-22 PROCEDURE — 3075F SYST BP GE 130 - 139MM HG: CPT | Performed by: FAMILY MEDICINE

## 2021-03-22 PROCEDURE — 3078F DIAST BP <80 MM HG: CPT | Performed by: FAMILY MEDICINE

## 2021-03-22 PROCEDURE — 3008F BODY MASS INDEX DOCD: CPT | Performed by: FAMILY MEDICINE

## 2021-03-22 PROCEDURE — 99213 OFFICE O/P EST LOW 20 MIN: CPT | Performed by: FAMILY MEDICINE

## 2021-03-22 NOTE — PATIENT INSTRUCTIONS

## 2021-03-22 NOTE — PROGRESS NOTES
Assessment/Plan:    Essential hypertension, benign    Patient here today for follow-up of her hypertension  At her last visit, amlodipine was added to her regiment  Her blood pressure is now well controlled  She will continue amlodipine, metoprolol, and lisinopril - hydrochlorothiazide  I reviewed her labs  her potassium was noted to be normal    Hyperlipidemia   Patient has hyperlipidemia  I reviewed her labs  Her total cholesterol is 235  Triglycerides are 116  HDL cholesterol is 54  LDL cholesterol is 158  For now, we are going to try to treat the patient with diet and exercise  We discussed this at length  I planned repeat in 6 months  Diagnoses and all orders for this visit:    Essential hypertension, benign    Pure hypercholesterolemia  -     Lipid panel; Future  -     Comprehensive metabolic panel; Future    Encounter for long-term (current) use of medications  -     CBC and differential; Future          Subjective:      Patient ID: Jenifer Smith is a 60 y o  female  This is a 81-year-old white female who presents to the office today for her follow-up visit  The patient is doing well  She tells me she feels better on the amlodipine, more relaxed  She noticed this right away  She checks her blood pressures at home  Recently broke her blood pressure cuff but until then, her blood pressures were much better  Tells me it was also good when she saw her gastroenterologist recently  She is trying to follow a low-salt diet as well as a low-fat diet  She wants to try to lose weight  Tells me she used to weigh 95 lb  Gets frustrated because she can not lose weight        The following portions of the patient's history were reviewed and updated as appropriate: allergies, current medications, past family history, past medical history, past social history, past surgical history and problem list     Review of Systems   Constitutional: Negative for activity change, appetite change and unexpected weight change  Respiratory: Negative for cough, shortness of breath and wheezing  Cardiovascular: Negative for chest pain, palpitations and leg swelling  Gastrointestinal: Negative for abdominal distention, abdominal pain, blood in stool, constipation, diarrhea and nausea  Positive for heartburn         Objective:      /76 (BP Location: Left arm, Patient Position: Sitting, Cuff Size: Adult)   Pulse 66   Temp 98 5 °F (36 9 °C) (Temporal)   Ht 5' 1" (1 549 m)   Wt 63 5 kg (139 lb 14 4 oz)   SpO2 99%   BMI 26 43 kg/m²          Physical Exam  Vitals signs reviewed  Constitutional:       Comments: This is a 61-year-old white female who appears her stated age  She is pleasant, cooperative, and in no distress  HENT:      Head: Normocephalic and atraumatic  Right Ear: Tympanic membrane, ear canal and external ear normal       Left Ear: Tympanic membrane, ear canal and external ear normal       Mouth/Throat:      Mouth: Mucous membranes are moist       Pharynx: Oropharynx is clear  Eyes:      General: No scleral icterus  Right eye: No discharge  Left eye: No discharge  Conjunctiva/sclera: Conjunctivae normal       Pupils: Pupils are equal, round, and reactive to light  Neck:      Musculoskeletal: Neck supple  Vascular: No carotid bruit  Comments: No thyromegaly was noted  Cardiovascular:      Rate and Rhythm: Normal rate and regular rhythm  Heart sounds: Normal heart sounds  No murmur  No friction rub  No gallop  Pulmonary:      Effort: Pulmonary effort is normal  No respiratory distress  Breath sounds: Normal breath sounds  No stridor  No wheezing, rhonchi or rales  Abdominal:      General: Bowel sounds are normal  There is no distension  Palpations: Abdomen is soft  There is no mass  Tenderness: There is no abdominal tenderness  There is no guarding  Lymphadenopathy:      Cervical: No cervical adenopathy  Psychiatric:         Mood and Affect: Mood normal          Thought Content:  Thought content normal          Judgment: Judgment normal        Extremities:  Without cyanosis, clubbing, or edema

## 2021-03-22 NOTE — ASSESSMENT & PLAN NOTE
Patient here today for follow-up of her hypertension  At her last visit, amlodipine was added to her regiment  Her blood pressure is now well controlled  She will continue amlodipine, metoprolol, and lisinopril - hydrochlorothiazide  I reviewed her labs   her potassium was noted to be normal

## 2021-03-22 NOTE — ASSESSMENT & PLAN NOTE
Patient has hyperlipidemia  I reviewed her labs  Her total cholesterol is 235  Triglycerides are 116  HDL cholesterol is 54  LDL cholesterol is 158  For now, we are going to try to treat the patient with diet and exercise  We discussed this at length  I planned repeat in 6 months

## 2021-03-23 ENCOUNTER — TELEPHONE (OUTPATIENT)
Dept: ADMINISTRATIVE | Facility: OTHER | Age: 66
End: 2021-03-23

## 2021-03-23 NOTE — TELEPHONE ENCOUNTER
Upon review of the In Basket request we were able to locate, review, and update the patient chart as requested for Pap Smear (HPV) aka Cervical Cancer Screening  Any additional questions or concerns should be emailed to the Practice Liaisons via [email or web address] email, please do not reply via In Basket      Thank you  Meredith Sullivan MA

## 2021-03-23 NOTE — TELEPHONE ENCOUNTER
----- Message from Ginger Patterson MA sent at 3/22/2021  9:18 AM EDT -----  Regarding: cervical screening [facility]  03/22/21 9:19 AM    Hello, our patient Olga Ratliff has had Pap Smear (HPV) aka Cervical Cancer Screening completed/performed  Please assist in updating the patient chart by making an External outreach to  [address] [place] [address] and Gynecology facility located in [place]  The date of service is 11/2020      Thank you,  Ginger Patterson MA  [facility] PRIMARY CARE

## 2021-03-24 ENCOUNTER — IMMUNIZATIONS (OUTPATIENT)
Dept: FAMILY MEDICINE CLINIC | Facility: HOSPITAL | Age: 66
End: 2021-03-24

## 2021-03-24 DIAGNOSIS — Z23 ENCOUNTER FOR IMMUNIZATION: Primary | ICD-10-CM

## 2021-03-24 PROCEDURE — 91301 SARS-COV-2 / COVID-19 MRNA VACCINE (MODERNA) 100 MCG: CPT

## 2021-03-24 PROCEDURE — 0011A SARS-COV-2 / COVID-19 MRNA VACCINE (MODERNA) 100 MCG: CPT

## 2021-04-22 ENCOUNTER — IMMUNIZATIONS (OUTPATIENT)
Dept: FAMILY MEDICINE CLINIC | Facility: HOSPITAL | Age: 66
End: 2021-04-22

## 2021-04-22 DIAGNOSIS — Z23 ENCOUNTER FOR IMMUNIZATION: Primary | ICD-10-CM

## 2021-04-22 PROCEDURE — 0012A SARS-COV-2 / COVID-19 MRNA VACCINE (MODERNA) 100 MCG: CPT

## 2021-04-22 PROCEDURE — 91301 SARS-COV-2 / COVID-19 MRNA VACCINE (MODERNA) 100 MCG: CPT

## 2021-08-18 DIAGNOSIS — I10 ESSENTIAL HYPERTENSION, BENIGN: ICD-10-CM

## 2021-08-18 RX ORDER — AMLODIPINE BESYLATE 5 MG/1
5 TABLET ORAL DAILY
Qty: 30 TABLET | Refills: 5 | Status: SHIPPED | OUTPATIENT
Start: 2021-08-18 | End: 2022-02-03

## 2021-09-24 ENCOUNTER — OFFICE VISIT (OUTPATIENT)
Dept: FAMILY MEDICINE CLINIC | Facility: CLINIC | Age: 66
End: 2021-09-24
Payer: COMMERCIAL

## 2021-09-24 VITALS
WEIGHT: 140.3 LBS | TEMPERATURE: 98.2 F | HEART RATE: 69 BPM | OXYGEN SATURATION: 98 % | DIASTOLIC BLOOD PRESSURE: 74 MMHG | BODY MASS INDEX: 26.49 KG/M2 | HEIGHT: 61 IN | SYSTOLIC BLOOD PRESSURE: 136 MMHG

## 2021-09-24 DIAGNOSIS — Z23 ENCOUNTER FOR IMMUNIZATION: ICD-10-CM

## 2021-09-24 DIAGNOSIS — F41.1 GENERALIZED ANXIETY DISORDER: ICD-10-CM

## 2021-09-24 DIAGNOSIS — I10 ESSENTIAL HYPERTENSION, BENIGN: ICD-10-CM

## 2021-09-24 DIAGNOSIS — E78.2 MIXED HYPERLIPIDEMIA: Primary | ICD-10-CM

## 2021-09-24 PROCEDURE — 99214 OFFICE O/P EST MOD 30 MIN: CPT | Performed by: FAMILY MEDICINE

## 2021-09-24 PROCEDURE — 90682 RIV4 VACC RECOMBINANT DNA IM: CPT

## 2021-09-24 PROCEDURE — 90471 IMMUNIZATION ADMIN: CPT

## 2021-10-07 DIAGNOSIS — I10 HYPERTENSION, UNSPECIFIED TYPE: ICD-10-CM

## 2021-10-07 RX ORDER — LISINOPRIL AND HYDROCHLOROTHIAZIDE 20; 12.5 MG/1; MG/1
TABLET ORAL
Qty: 135 TABLET | Refills: 3 | Status: SHIPPED | OUTPATIENT
Start: 2021-10-07

## 2022-02-03 DIAGNOSIS — I10 ESSENTIAL HYPERTENSION, BENIGN: ICD-10-CM

## 2022-02-03 RX ORDER — AMLODIPINE BESYLATE 5 MG/1
TABLET ORAL
Qty: 90 TABLET | Refills: 1 | Status: SHIPPED | OUTPATIENT
Start: 2022-02-03 | End: 2022-07-23

## 2022-02-07 DIAGNOSIS — I10 ESSENTIAL HYPERTENSION, BENIGN: ICD-10-CM

## 2022-02-07 RX ORDER — METOPROLOL SUCCINATE 50 MG/1
50 TABLET, EXTENDED RELEASE ORAL DAILY
Qty: 90 TABLET | Refills: 3 | Status: SHIPPED | OUTPATIENT
Start: 2022-02-07

## 2022-03-19 ENCOUNTER — APPOINTMENT (OUTPATIENT)
Dept: LAB | Facility: HOSPITAL | Age: 67
End: 2022-03-19
Attending: FAMILY MEDICINE
Payer: MEDICARE

## 2022-03-19 DIAGNOSIS — E78.2 MIXED HYPERLIPIDEMIA: ICD-10-CM

## 2022-03-19 DIAGNOSIS — I10 ESSENTIAL HYPERTENSION, BENIGN: ICD-10-CM

## 2022-03-19 LAB
ALBUMIN SERPL BCP-MCNC: 3.8 G/DL (ref 3.5–5)
ALP SERPL-CCNC: 70 U/L (ref 46–116)
ALT SERPL W P-5'-P-CCNC: 28 U/L (ref 12–78)
ANION GAP SERPL CALCULATED.3IONS-SCNC: 5 MMOL/L (ref 4–13)
AST SERPL W P-5'-P-CCNC: 22 U/L (ref 5–45)
BILIRUB SERPL-MCNC: 0.36 MG/DL (ref 0.2–1)
BUN SERPL-MCNC: 18 MG/DL (ref 5–25)
CALCIUM SERPL-MCNC: 8.8 MG/DL (ref 8.3–10.1)
CHLORIDE SERPL-SCNC: 105 MMOL/L (ref 100–108)
CHOLEST SERPL-MCNC: 198 MG/DL
CO2 SERPL-SCNC: 32 MMOL/L (ref 21–32)
CREAT SERPL-MCNC: 0.99 MG/DL (ref 0.6–1.3)
GFR SERPL CREATININE-BSD FRML MDRD: 59 ML/MIN/1.73SQ M
GLUCOSE P FAST SERPL-MCNC: 89 MG/DL (ref 65–99)
HDLC SERPL-MCNC: 48 MG/DL
LDLC SERPL CALC-MCNC: 125 MG/DL (ref 0–100)
POTASSIUM SERPL-SCNC: 3.3 MMOL/L (ref 3.5–5.3)
PROT SERPL-MCNC: 7 G/DL (ref 6.4–8.2)
SODIUM SERPL-SCNC: 142 MMOL/L (ref 136–145)
TRIGL SERPL-MCNC: 125 MG/DL

## 2022-03-19 PROCEDURE — 80061 LIPID PANEL: CPT

## 2022-03-19 PROCEDURE — 80053 COMPREHEN METABOLIC PANEL: CPT

## 2022-03-19 PROCEDURE — 36415 COLL VENOUS BLD VENIPUNCTURE: CPT

## 2022-03-25 ENCOUNTER — OFFICE VISIT (OUTPATIENT)
Dept: FAMILY MEDICINE CLINIC | Facility: CLINIC | Age: 67
End: 2022-03-25
Payer: MEDICARE

## 2022-03-25 VITALS
TEMPERATURE: 98.1 F | OXYGEN SATURATION: 98 % | BODY MASS INDEX: 28.27 KG/M2 | HEART RATE: 63 BPM | WEIGHT: 144 LBS | HEIGHT: 60 IN | SYSTOLIC BLOOD PRESSURE: 132 MMHG | DIASTOLIC BLOOD PRESSURE: 82 MMHG

## 2022-03-25 DIAGNOSIS — I10 ESSENTIAL HYPERTENSION, BENIGN: Primary | ICD-10-CM

## 2022-03-25 DIAGNOSIS — Z00.00 WELCOME TO MEDICARE PREVENTIVE VISIT: ICD-10-CM

## 2022-03-25 DIAGNOSIS — E78.2 MIXED HYPERLIPIDEMIA: ICD-10-CM

## 2022-03-25 DIAGNOSIS — E87.6 HYPOKALEMIA: ICD-10-CM

## 2022-03-25 DIAGNOSIS — N18.30 STAGE 3 CHRONIC KIDNEY DISEASE, UNSPECIFIED WHETHER STAGE 3A OR 3B CKD (HCC): ICD-10-CM

## 2022-03-25 DIAGNOSIS — H90.3 SENSORINEURAL HEARING LOSS (SNHL) OF BOTH EARS: ICD-10-CM

## 2022-03-25 DIAGNOSIS — F41.1 GENERALIZED ANXIETY DISORDER: ICD-10-CM

## 2022-03-25 PROCEDURE — G0403 EKG FOR INITIAL PREVENT EXAM: HCPCS | Performed by: FAMILY MEDICINE

## 2022-03-25 PROCEDURE — 1123F ACP DISCUSS/DSCN MKR DOCD: CPT | Performed by: FAMILY MEDICINE

## 2022-03-25 PROCEDURE — 99214 OFFICE O/P EST MOD 30 MIN: CPT | Performed by: FAMILY MEDICINE

## 2022-03-25 PROCEDURE — G0402 INITIAL PREVENTIVE EXAM: HCPCS | Performed by: FAMILY MEDICINE

## 2022-03-25 RX ORDER — POTASSIUM CHLORIDE 20 MEQ/1
20 TABLET, EXTENDED RELEASE ORAL DAILY
Qty: 5 TABLET | Refills: 0 | Status: SHIPPED | OUTPATIENT
Start: 2022-03-25 | End: 2022-04-04

## 2022-03-25 RX ORDER — LORAZEPAM 0.5 MG/1
0.5 TABLET ORAL EVERY 8 HOURS PRN
Qty: 150 TABLET | Refills: 0 | Status: SHIPPED | OUTPATIENT
Start: 2022-03-25

## 2022-03-25 NOTE — PROGRESS NOTES
Assessment and Plan:     Problem List Items Addressed This Visit        Cardiovascular and Mediastinum    Essential hypertension, benign - Primary     Blood pressure is well controlled today  The patient will continue metoprolol, amlodipine, and lisinopril-hydrochlorothiazide  I asked the patient to continue to walk regularly for exercise and follow a low-sodium diet            Nervous and Auditory    Sensorineural hearing loss (SNHL) of both ears     We discussed her hearing loss  The patient would like an evaluation  I referred the patient to audiology for comprehensive audiogram and hearing evaluation         Relevant Orders    Ambulatory Referral to Audiology       Genitourinary    Stage 3 chronic kidney disease, unspecified whether stage 3a or 3b CKD ([facility] [address] )       Other    Mixed hyperlipidemia     Patient has hyperlipidemia  I reviewed her labs  Total cholesterol was 198  Triglycerides were 125  Previously, she was 214  HDL cholesterol is 48  Her LDL cholesterol is 125  Her goal is less than 100  Her ACC/AHA cardiovascular risk is 8 3%  Since she is less than 10%, I am not going to start her on a statin  I encouraged the patient to try to follow a low-fat diet  Relevant Orders    LDL cholesterol, direct    Generalized anxiety disorder    Relevant Medications    LORazepam (ATIVAN) 0 5 mg tablet    Hypokalemia     Patient has hypokalemia  Her potassium was noted to be 3 3  I reviewed my records in note that her potassiums have been normal in the past   I explained to the patient that this is secondary to the hydrochlorothiazide  Since she was normal in the past, I am not going to put her on a maintenance dose of potassium but I do think we should try to replenish her potassium at this time  I prescribed potassium chloride 20 mEq tablets  She will take 1 daily for 5 days  She was advised that these are large tablets    If she has trouble swallowing them, she can break them in half or she can just drop in a glass of water, stir it up and drink it  Relevant Medications    potassium chloride (K-DUR,KLOR-CON) 20 mEq tablet    Other Relevant Orders    Comprehensive metabolic panel    Welcome to Medicare preventive visit     EKG was done today  EKG showed a normal sinus rhythm and was a normal EKG         Relevant Orders    POCT ECG (Completed)        BMI Counseling: Body mass index is 28 12 kg/m²  The BMI is above normal  Nutrition recommendations include decreasing portion sizes, moderation in carbohydrate intake and reducing intake of cholesterol  Exercise recommendations include exercising 3-5 times per week  Rationale for BMI follow-up plan is due to patient being overweight or obese  Depression Screening and Follow-up Plan: Patient was screened for depression during today's encounter  They screened negative with a PHQ-2 score of 0  Preventive health issues were discussed with patient, and age appropriate screening tests were ordered as noted in patient's After Visit Summary  Personalized health advice and appropriate referrals for health education or preventive services given if needed, as noted in patient's After Visit Summary       History of Present Illness:     Patient presents for Medicare Annual Wellness visit    Patient Care Team:  Evie Owen DO as PCP - General     Problem List:     Patient Active Problem List   Diagnosis    Mixed hyperlipidemia    Generalized anxiety disorder    Essential hypertension, benign    Encounter for hepatitis C screening test for low risk patient    Encounter for long-term (current) use of medications    Epigastric pain    Stage 3 chronic kidney disease, unspecified whether stage 3a or 3b CKD ([address] )    Hypokalemia    Sensorineural hearing loss (SNHL) of both ears    Welcome to Medicare preventive visit      Past Medical and Surgical History:     Past Medical History:   Diagnosis Date    AVM (arteriovenous malformation) of colon     Diverticulosis     Dyspepsia     Generalized anxiety disorder     last assessed: 8/14/2017    Hemorrhoids     Hyperlipidemia     last assessed: 8/14/2017    Hypertension     last assessed: 8/14/2017    Interstitial cystitis     Irritable bowel     Mild chronic gastritis     Mitral valve prolapse     Osteoporosis     Vertigo      Past Surgical History:   Procedure Laterality Date    COLONOSCOPY  06/06/2017    4mm polyp was found in the proximal sigmoid colon  The polyp was hyperplastic  The polyp was removed w/ a cold bx forceps  Resection and retrieval were complete  Bleeding internal hemorrhoids were found during retroflexion and during endocopy  The hemorrhoids were Grade II (internal hemorrhoids that prolapse but reduce spontaneously)   COLONOSCOPY W/ BIOPSIES  03/25/2009    bx's x6  Hemorrhoids  Two polyps   EGD  04/03/2009    Mild antral gastritis  Esophageal island   FLEXIBLE SIGMOIDOSCOPY  04/19/2018    digital rectal exam revealed 1cm firm anal mass palpated 0 to 1 cm from the anal verge  The exam was otherwise without abnormality to 12 cm with nonbloody stool      JOINT REPLACEMENT      right knee    KNEE SURGERY      TONSILECTOMY AND ADNOIDECTOMY        Family History:     Family History   Problem Relation Age of Onset    Hypertension Mother     Skin cancer Mother    Gabe Yauco Breast cancer Mother     Hypertension Father     Stroke Father     No Known Problems Daughter       Social History:     Social History     Socioeconomic History    Marital status: /Civil Union     Spouse name: None    Number of children: None    Years of education: None    Highest education level: None   Occupational History    None   Tobacco Use    Smoking status: Never Smoker    Smokeless tobacco: Never Used   Vaping Use    Vaping Use: Never used   Substance and Sexual Activity    Alcohol use: Yes     Comment: rarely    Drug use: Never    Sexual activity: None   Other Topics Concern    None Social History Narrative    None     Social Determinants of Health     Financial Resource Strain: Not on file   Food Insecurity: Not on file   Transportation Needs: Not on file   Physical Activity: Not on file   Stress: Not on file   Social Connections: Not on file   Intimate Partner Violence: Not on file   Housing Stability: Not on file      Medications and Allergies:     Current Outpatient Medications   Medication Sig Dispense Refill    amLODIPine (NORVASC) 5 mg tablet TAKE 1 TABLET BY MOUTH EVERY DAY 90 tablet 1    ascorbic acid (VITAMIN C) 250 MG tablet Take 500 mg by mouth 2 (two) times a day      ASPIRIN 81 PO Take 81 mg by mouth daily       Calcium Citrate-Vitamin D3 315-250 MG-UNIT TABS Take 1 tablet by mouth daily      lisinopril-hydrochlorothiazide (PRINZIDE,ZESTORETIC) 20-12 5 MG per tablet 1 TABLET IN MORNING THEN 1/2 TABLET IN EVENING 135 tablet 3    LORazepam (ATIVAN) 0 5 mg tablet Take 1 tablet (0 5 mg total) by mouth every 8 (eight) hours as needed for anxiety 150 tablet 0    metoprolol succinate (TOPROL-XL) 50 mg 24 hr tablet Take 1 tablet (50 mg total) by mouth daily 90 tablet 3    Multiple Vitamins-Minerals (CENTRUM SILVER 50+WOMEN) TABS Take by mouth      psyllium (METAMUCIL) 58 6 % packet Take 1 packet by mouth 2 (two) times a day 2 TSP DAILY       omeprazole (PriLOSEC) 20 mg delayed release capsule TAKE 1 CAPSULE BY MOUTH EVERY DAY (Patient not taking: Reported on 3/25/2022) 90 capsule 0    potassium chloride (K-DUR,KLOR-CON) 20 mEq tablet Take 1 tablet (20 mEq total) by mouth daily for 5 days 5 tablet 0     No current facility-administered medications for this visit       Allergies   Allergen Reactions    Medical Tape Rash      Immunizations:     Immunization History   Administered Date(s) Administered    COVID-19 MODERNA VACC 0 5 ML IM 03/24/2021, 04/22/2021, 12/03/2021    Fluzone Split Quad 0 5 mL 10/10/2019    INFLUENZA 01/01/2008, 09/28/2014, 10/21/2015, 10/05/2016, 10/25/2017, 10/01/2018, 10/10/2019    Influenza, injectable, quadrivalent, preservative free 0 5 mL 09/08/2020    Influenza, recombinant, quadrivalent,injectable, preservative free 09/24/2021    Pneumococcal Polysaccharide PPV23 01/01/1998    Zoster Vaccine Recombinant 08/20/2020, 02/22/2021      Health Maintenance:         Topic Date Due    HIV Screening  Never done    Breast Cancer Screening: Mammogram  09/17/2021    Colorectal Cancer Screening  06/06/2027    Hepatitis C Screening  Completed         Topic Date Due    DTaP,Tdap,and Td Vaccines (1 - Tdap) Never done    Pneumococcal Vaccine: 65+ Years (1 of 1 - PPSV23) 11/21/2021      Medicare Health Risk Assessment:     /82 (BP Location: Left arm, Patient Position: Sitting, Cuff Size: Adult)   Pulse 63   Temp 98 1 °F (36 7 °C) (Tympanic)   Ht 5' (1 524 m)   Wt 65 3 kg (144 lb)   SpO2 98%   BMI 28 12 kg/m²      Timoteo Tuttle is here for her Welcome to Medicare visit  Health Risk Assessment:   Patient rates overall health as very good  Patient feels that their physical health rating is slightly better  Patient is very satisfied with their life  Eyesight was rated as same  Hearing was rated as slightly worse  Patient feels that their emotional and mental health rating is same  Patients states they are never, rarely angry  Patient states they are never, rarely unusually tired/fatigued  Pain experienced in the last 7 days has been some  Patient's pain rating has been 2/10  Patient states that she has experienced no weight loss or gain in last 6 months  Right knee pain if she overdoes it    Depression Screening:   PHQ-2 Score: 0      Fall Risk Screening: In the past year, patient has experienced: no history of falling in past year      Urinary Incontinence Screening:   Patient has leaked urine accidently in the last six months   Urinary stress incontinence symptoms    Home Safety:  Patient does not have trouble with stairs inside or outside of their home  Patient has working smoke alarms and has working carbon monoxide detector  Home safety hazards include: none  Nutrition:   Current diet is Regular and No Added Salt  Medications:   Patient is currently taking over-the-counter supplements  OTC medications include: see medication list  Patient is able to manage medications  Activities of Daily Living (ADLs)/Instrumental Activities of Daily Living (IADLs):   Walk and transfer into and out of bed and chair?: Yes  Dress and groom yourself?: Yes    Bathe or shower yourself?: Yes    Feed yourself? Yes  Do your laundry/housekeeping?: Yes  Manage your money, pay your bills and track your expenses?: Yes  Make your own meals?: Yes    Do your own shopping?: Yes    Previous Hospitalizations:   Any hospitalizations or ED visits within the last 12 months?: No      Advance Care Planning:   Living will: No    Durable POA for healthcare: No    Advanced directive: No      Cognitive Screening:   Provider or family/friend/caregiver concerned regarding cognition?: No    PREVENTIVE SCREENINGS      Cardiovascular Screening:    General: Screening Not Indicated and History Lipid Disorder      Diabetes Screening:     General: Screening Current      Colorectal Cancer Screening:     General: Screening Current      Breast Cancer Screening:     General: Screening Current      Cervical Cancer Screening:    General: Screening Not Indicated      Osteoporosis Screening:    General: Screening Current      Abdominal Aortic Aneurysm (AAA) Screening:        General: Screening Not Indicated      Lung Cancer Screening:     General: Screening Not Indicated      Hepatitis C Screening:    General: Screening Current    Screening, Brief Intervention, and Referral to Treatment (SBIRT)    Screening  Typical number of drinks in a day: 0  Typical number of drinks in a week: 0  Interpretation: Low risk drinking behavior      AUDIT-C Screenin) How often did you have a drink containing alcohol in the past year? never  2) How many drinks did you have on a typical day when you were drinking in the past year? 0  3) How often did you have 6 or more drinks on one occasion in the past year? never    AUDIT-C Score: 0  Interpretation: Score 0-2 (female): Negative screen for alcohol misuse    Single Item Drug Screening:  How often have you used an illegal drug (including marijuana) or a prescription medication for non-medical reasons in the past year? never    Single Item Drug Screen Score: 0  Interpretation: Negative screen for possible drug use disorder      Ary Crabtree, DO

## 2022-03-25 NOTE — PATIENT INSTRUCTIONS
Medicare Preventive Visit Patient Instructions  Thank you for completing your Welcome to Medicare Visit or Medicare Annual Wellness Visit today  Your next wellness visit will be due in one year (3/26/2023)  The screening/preventive services that you may require over the next 5-10 years are detailed below  Some tests may not apply to you based off risk factors and/or age  Screening tests ordered at today's visit but not completed yet may show as past due  Also, please note that scanned in results may not display below  Preventive Screenings:  Service Recommendations Previous Testing/Comments   Colorectal Cancer Screening  * Colonoscopy    * Fecal Occult Blood Test (FOBT)/Fecal Immunochemical Test (FIT)  * Fecal DNA/Cologuard Test  * Flexible Sigmoidoscopy Age: 54-65 years old   Colonoscopy: every 10 years (may be performed more frequently if at higher risk)  OR  FOBT/FIT: every 1 year  OR  Cologuard: every 3 years  OR  Sigmoidoscopy: every 5 years  Screening may be recommended earlier than age 48 if at higher risk for colorectal cancer  Also, an individualized decision between you and your healthcare provider will decide whether screening between the ages of 74-80 would be appropriate  Colonoscopy: 06/06/2017  FOBT/FIT: Not on file  Cologuard: Not on file  Sigmoidoscopy: Not on file    Screening Current     Breast Cancer Screening Age: 36 years old  Frequency: every 1-2 years  Not required if history of left and right mastectomy Mammogram: 09/17/2020    Screening Current   Cervical Cancer Screening Between the ages of 21-29, pap smear recommended once every 3 years  Between the ages of 33-67, can perform pap smear with HPV co-testing every 5 years     Recommendations may differ for women with a history of total hysterectomy, cervical cancer, or abnormal pap smears in past  Pap Smear: 11/05/2020    Screening Not Indicated   Hepatitis C Screening Once for adults born between 1945 and 1965  More frequently in patients at high risk for Hepatitis C Hep C Antibody: 08/19/2020    Screening Current   Diabetes Screening 1-2 times per year if you're at risk for diabetes or have pre-diabetes Fasting glucose: 89 mg/dL   A1C: No results in last 5 years    Screening Current   Cholesterol Screening Once every 5 years if you don't have a lipid disorder  May order more often based on risk factors  Lipid panel: 03/19/2022    Screening Not Indicated  History Lipid Disorder     Other Preventive Screenings Covered by Medicare:  1  Abdominal Aortic Aneurysm (AAA) Screening: covered once if your at risk  You're considered to be at risk if you have a family history of AAA  2  Lung Cancer Screening: covers low dose CT scan once per year if you meet all of the following conditions: (1) Age 50-69; (2) No signs or symptoms of lung cancer; (3) Current smoker or have quit smoking within the last 15 years; (4) You have a tobacco smoking history of at least 30 pack years (packs per day multiplied by number of years you smoked); (5) You get a written order from a healthcare provider  3  Glaucoma Screening: covered annually if you're considered high risk: (1) You have diabetes OR (2) Family history of glaucoma OR (3)  aged 48 and older OR (3)  American aged 72 and older  3  Osteoporosis Screening: covered every 2 years if you meet one of the following conditions: (1) You're estrogen deficient and at risk for osteoporosis based off medical history and other findings; (2) Have a vertebral abnormality; (3) On glucocorticoid therapy for more than 3 months; (4) Have primary hyperparathyroidism; (5) On osteoporosis medications and need to assess response to drug therapy  · Last bone density test (DXA Scan): Not on file  5  HIV Screening: covered annually if you're between the age of 12-76  Also covered annually if you are younger than 13 and older than 72 with risk factors for HIV infection   For pregnant patients, it is covered up to 3 times per pregnancy  Immunizations:  Immunization Recommendations   Influenza Vaccine Annual influenza vaccination during flu season is recommended for all persons aged >= 6 months who do not have contraindications   Pneumococcal Vaccine (Prevnar and Pneumovax)  * Prevnar = PCV13  * Pneumovax = PPSV23   Adults 25-60 years old: 1-3 doses may be recommended based on certain risk factors  Adults 72 years old: Prevnar (PCV13) vaccine recommended followed by Pneumovax (PPSV23) vaccine  If already received PPSV23 since turning 65, then PCV13 recommended at least one year after PPSV23 dose  Hepatitis B Vaccine 3 dose series if at intermediate or high risk (ex: diabetes, end stage renal disease, liver disease)   Tetanus (Td) Vaccine - COST NOT COVERED BY MEDICARE PART B Following completion of primary series, a booster dose should be given every 10 years to maintain immunity against tetanus  Td may also be given as tetanus wound prophylaxis  Tdap Vaccine - COST NOT COVERED BY MEDICARE PART B Recommended at least once for all adults  For pregnant patients, recommended with each pregnancy  Shingles Vaccine (Shingrix) - COST NOT COVERED BY MEDICARE PART B  2 shot series recommended in those aged 48 and above     Health Maintenance Due:      Topic Date Due    HIV Screening  Never done    Breast Cancer Screening: Mammogram  09/17/2021    Colorectal Cancer Screening  06/06/2027    Hepatitis C Screening  Completed     Immunizations Due:      Topic Date Due    DTaP,Tdap,and Td Vaccines (1 - Tdap) Never done    Pneumococcal Vaccine: 65+ Years (1 of 1 - PPSV23) 11/21/2021     Advance Directives   What are advance directives? Advance directives are legal documents that state your wishes and plans for medical care  These plans are made ahead of time in case you lose your ability to make decisions for yourself   Advance directives can apply to any medical decision, such as the treatments you want, and if you want to donate organs  What are the types of advance directives? There are many types of advance directives, and each state has rules about how to use them  You may choose a combination of any of the following:  · Living will: This is a written record of the treatment you want  You can also choose which treatments you do not want, which to limit, and which to stop at a certain time  This includes surgery, medicine, IV fluid, and tube feedings  · Durable power of  for healthcare [facility]): This is a written record that states who you want to make healthcare choices for you when you are unable to make them for yourself  This person, called a proxy, is usually a family member or a friend  You may choose more than 1 proxy  · Do not resuscitate (DNR) order:  A DNR order is used in case your heart stops beating or you stop breathing  It is a request not to have certain forms of treatment, such as CPR  A DNR order may be included in other types of advance directives  · Medical directive: This covers the care that you want if you are in a coma, near death, or unable to make decisions for yourself  You can list the treatments you want for each condition  Treatment may include pain medicine, surgery, blood transfusions, dialysis, IV or tube feedings, and a ventilator (breathing machine)  · Values history: This document has questions about your views, beliefs, and how you feel and think about life  This information can help others choose the care that you would choose  Why are advance directives important? An advance directive helps you control your care  Although spoken wishes may be used, it is better to have your wishes written down  Spoken wishes can be misunderstood, or not followed  Treatments may be given even if you do not want them  An advance directive may make it easier for your family to make difficult choices about your care     Urinary Incontinence   Urinary incontinence (UI)  is when you lose control of your bladder  UI develops because your bladder cannot store or empty urine properly  The 3 most common types of UI are stress incontinence, urge incontinence, or both  Medicines:   · May be given to help strengthen your bladder control  Report any side effects of medication to your healthcare provider  Do pelvic muscle exercises often:  Your pelvic muscles help you stop urinating  Squeeze these muscles tight for 5 seconds, then relax for 5 seconds  Gradually work up to squeezing for 10 seconds  Do 3 sets of 15 repetitions a day, or as directed  This will help strengthen your pelvic muscles and improve bladder control  Train your bladder:  Go to the bathroom at set times, such as every 2 hours, even if you do not feel the urge to go  You can also try to hold your urine when you feel the urge to go  For example, hold your urine for 5 minutes when you feel the urge to go  As that becomes easier, hold your urine for 10 minutes  Self-care:   · Keep a UI record  Write down how often you leak urine and how much you leak  Make a note of what you were doing when you leaked urine  · Drink liquids as directed  You may need to limit the amount of liquid you drink to help control your urine leakage  Do not drink any liquid right before you go to bed  Limit or do not have drinks that contain caffeine or alcohol  · Prevent constipation  Eat a variety of high-fiber foods  Good examples are high-fiber cereals, beans, vegetables, and whole-grain breads  Walking is the best way to trigger your intestines to have a bowel movement  · Exercise regularly and maintain a healthy weight  Weight loss and exercise will decrease pressure on your bladder and help you control your leakage  · Use a catheter as directed  to help empty your bladder  A catheter is a tiny, plastic tube that is put into your bladder to drain your urine  · Go to behavior therapy as directed    Behavior therapy may be used to help you learn to control your urge to urinate  Weight Management   Why it is important to manage your weight:  Being overweight increases your risk of health conditions such as heart disease, high blood pressure, type 2 diabetes, and certain types of cancer  It can also increase your risk for osteoarthritis, sleep apnea, and other respiratory problems  Aim for a slow, steady weight loss  Even a small amount of weight loss can lower your risk of health problems  How to lose weight safely:  A safe and healthy way to lose weight is to eat fewer calories and get regular exercise  You can lose up about 1 pound a week by decreasing the number of calories you eat by 500 calories each day  Healthy meal plan for weight management:  A healthy meal plan includes a variety of foods, contains fewer calories, and helps you stay healthy  A healthy meal plan includes the following:  · Eat whole-grain foods more often  A healthy meal plan should contain fiber  Fiber is the part of grains, fruits, and vegetables that is not broken down by your body  Whole-grain foods are healthy and provide extra fiber in your diet  Some examples of whole-grain foods are whole-wheat breads and pastas, oatmeal, brown rice, and bulgur  · Eat a variety of vegetables every day  Include dark, leafy greens such as spinach, kale, megan greens, and mustard greens  Eat yellow and orange vegetables such as carrots, sweet potatoes, and winter squash  · Eat a variety of fruits every day  Choose fresh or canned fruit (canned in its own juice or light syrup) instead of juice  Fruit juice has very little or no fiber  · Eat low-fat dairy foods  Drink fat-free (skim) milk or 1% milk  Eat fat-free yogurt and low-fat cottage cheese  Try low-fat cheeses such as mozzarella and other reduced-fat cheeses  · Choose meat and other protein foods that are low in fat  Choose beans or other legumes such as split peas or lentils   Choose fish, skinless poultry (chicken or turkey), or lean cuts of red meat (beef or pork)  Before you cook meat or poultry, cut off any visible fat  · Use less fat and oil  Try baking foods instead of frying them  Add less fat, such as margarine, sour cream, regular salad dressing and mayonnaise to foods  Eat fewer high-fat foods  Some examples of high-fat foods include french fries, doughnuts, ice cream, and cakes  · Eat fewer sweets  Limit foods and drinks that are high in sugar  This includes candy, cookies, regular soda, and sweetened drinks  Exercise:  Exercise at least 30 minutes per day on most days of the week  Some examples of exercise include walking, biking, dancing, and swimming  You can also fit in more physical activity by taking the stairs instead of the elevator or parking farther away from stores  Ask your healthcare provider about the best exercise plan for you  © Copyright [employer] 2018 Information is for End User's use only and may not be sold, redistributed or otherwise used for commercial purposes   All illustrations and images included in CareNotes® are the copyrighted property of [employer]  or [address] [place] [address]

## 2022-03-25 NOTE — PROGRESS NOTES
Assessment/Plan:    Essential hypertension, benign  Blood pressure is well controlled today  The patient will continue metoprolol, amlodipine, and lisinopril-hydrochlorothiazide  I asked the patient to continue to walk regularly for exercise and follow a low-sodium diet    Hypokalemia  Patient has hypokalemia  Her potassium was noted to be 3 3  I reviewed my records in note that her potassiums have been normal in the past   I explained to the patient that this is secondary to the hydrochlorothiazide  Since she was normal in the past, I am not going to put her on a maintenance dose of potassium but I do think we should try to replenish her potassium at this time  I prescribed potassium chloride 20 mEq tablets  She will take 1 daily for 5 days  She was advised that these are large tablets  If she has trouble swallowing them, she can break them in half or she can just drop in a glass of water, stir it up and drink it  Sensorineural hearing loss (SNHL) of both ears  We discussed her hearing loss  The patient would like an evaluation  I referred the patient to audiology for comprehensive audiogram and hearing evaluation    Mixed hyperlipidemia  Patient has hyperlipidemia  I reviewed her labs  Total cholesterol was 198  Triglycerides were 125  Previously, she was 214  HDL cholesterol is 48  Her LDL cholesterol is 125  Her goal is less than 100  Her ACC/AHA cardiovascular risk is 8 3%  Since she is less than 10%, I am not going to start her on a statin  I encouraged the patient to try to follow a low-fat diet  Welcome to Medicare preventive visit  EKG was done today  EKG showed a normal sinus rhythm and was a normal EKG       Diagnoses and all orders for this visit:    Essential hypertension, benign    Welcome to Medicare preventive visit  -     POCT ECG    Hypokalemia  -     potassium chloride (K-DUR,KLOR-CON) 20 mEq tablet;  Take 1 tablet (20 mEq total) by mouth daily for 5 days  -     Comprehensive metabolic panel; Future    Stage 3 chronic kidney disease, unspecified whether stage 3a or 3b CKD (HCC)    Generalized anxiety disorder  -     LORazepam (ATIVAN) 0 5 mg tablet; Take 1 tablet (0 5 mg total) by mouth every 8 (eight) hours as needed for anxiety    Sensorineural hearing loss (SNHL) of both ears  -     Ambulatory Referral to Audiology; Future    Mixed hyperlipidemia  -     LDL cholesterol, direct; Future          Subjective:      Patient ID: Fatimah Palmer is a 73 y o  female  This is a 71-year-old white female who presents to the office today for her routine checkup as well as for her her Welcome to Medicare preventative evaluation  The patient is doing well  She tries to walk regularly for exercise  She had blood work done today in anticipation of the visit  She would like to go over that  She reports compliance with her medication  She admits to little right knee pain but very little  The following portions of the patient's history were reviewed and updated as appropriate: allergies, current medications, past family history, past medical history, past social history, past surgical history and problem list     Review of Systems   HENT: Positive for hearing loss  Eyes: Negative for visual disturbance  Cardiovascular: Negative for chest pain, palpitations and leg swelling  Gastrointestinal: Negative for abdominal distention, abdominal pain, blood in stool, constipation, diarrhea and nausea  Musculoskeletal: Positive for arthralgias  Neurological:        Reports nocturnal leg cramps         Objective:      /82 (BP Location: Left arm, Patient Position: Sitting, Cuff Size: Adult)   Pulse 63   Temp 98 1 °F (36 7 °C) (Tympanic)   Ht 5' (1 524 m)   Wt 65 3 kg (144 lb)   SpO2 98%   BMI 28 12 kg/m²          Physical Exam  Vitals reviewed  Constitutional:       Comments: This is a 71-year-old white female who appears her stated age    She is nonseptic in appearance and in no distress   HENT:      Head: Normocephalic and atraumatic  Right Ear: Tympanic membrane, ear canal and external ear normal  There is no impacted cerumen  Left Ear: Tympanic membrane, ear canal and external ear normal  There is no impacted cerumen  Mouth/Throat:      Mouth: Mucous membranes are moist       Pharynx: Oropharynx is clear  No oropharyngeal exudate or posterior oropharyngeal erythema  Eyes:      General: No scleral icterus  Right eye: No discharge  Left eye: No discharge  Conjunctiva/sclera: Conjunctivae normal       Pupils: Pupils are equal, round, and reactive to light  Neck:      Comments: There is no thyromegaly  Cardiovascular:      Rate and Rhythm: Normal rate and regular rhythm  Heart sounds: Normal heart sounds  No murmur heard  No friction rub  No gallop  Pulmonary:      Effort: Pulmonary effort is normal  No respiratory distress  Breath sounds: Normal breath sounds  No stridor  No wheezing, rhonchi or rales  Abdominal:      General: Bowel sounds are normal  There is no distension  Palpations: Abdomen is soft  There is no mass  Tenderness: There is no abdominal tenderness  There is no guarding  Comments: There was no hepatosplenomegaly   Musculoskeletal:      Cervical back: Neck supple  Lymphadenopathy:      Cervical: No cervical adenopathy  Psychiatric:         Mood and Affect: Mood normal          Behavior: Behavior normal          Thought Content:  Thought content normal          Judgment: Judgment normal        extremities:  Without cyanosis, clubbing, or edema

## 2022-03-30 PROBLEM — E87.6 HYPOKALEMIA: Status: ACTIVE | Noted: 2022-03-30

## 2022-03-30 PROBLEM — Z00.00 WELCOME TO MEDICARE PREVENTIVE VISIT: Status: ACTIVE | Noted: 2022-03-30

## 2022-03-30 PROBLEM — H90.3 SENSORINEURAL HEARING LOSS (SNHL) OF BOTH EARS: Status: ACTIVE | Noted: 2022-03-30

## 2022-03-30 NOTE — ASSESSMENT & PLAN NOTE
We discussed her hearing loss  The patient would like an evaluation    I referred the patient to audiology for comprehensive audiogram and hearing evaluation

## 2022-03-30 NOTE — ASSESSMENT & PLAN NOTE
Blood pressure is well controlled today  The patient will continue metoprolol, amlodipine, and lisinopril-hydrochlorothiazide    I asked the patient to continue to walk regularly for exercise and follow a low-sodium diet

## 2022-03-30 NOTE — ASSESSMENT & PLAN NOTE
Patient has hypokalemia  Her potassium was noted to be 3 3  I reviewed my records in note that her potassiums have been normal in the past   I explained to the patient that this is secondary to the hydrochlorothiazide  Since she was normal in the past, I am not going to put her on a maintenance dose of potassium but I do think we should try to replenish her potassium at this time  I prescribed potassium chloride 20 mEq tablets  She will take 1 daily for 5 days  She was advised that these are large tablets  If she has trouble swallowing them, she can break them in half or she can just drop in a glass of water, stir it up and drink it

## 2022-03-30 NOTE — ASSESSMENT & PLAN NOTE
Patient has hyperlipidemia  I reviewed her labs  Total cholesterol was 198  Triglycerides were 125  Previously, she was 214  HDL cholesterol is 48  Her LDL cholesterol is 125  Her goal is less than 100  Her ACC/AHA cardiovascular risk is 8 3%  Since she is less than 10%, I am not going to start her on a statin  I encouraged the patient to try to follow a low-fat diet

## 2022-04-04 ENCOUNTER — OFFICE VISIT (OUTPATIENT)
Dept: FAMILY MEDICINE CLINIC | Facility: CLINIC | Age: 67
End: 2022-04-04
Payer: MEDICARE

## 2022-04-04 ENCOUNTER — TELEPHONE (OUTPATIENT)
Dept: ADMINISTRATIVE | Facility: OTHER | Age: 67
End: 2022-04-04

## 2022-04-04 VITALS
HEART RATE: 59 BPM | DIASTOLIC BLOOD PRESSURE: 80 MMHG | TEMPERATURE: 98.2 F | SYSTOLIC BLOOD PRESSURE: 128 MMHG | OXYGEN SATURATION: 98 % | WEIGHT: 140.9 LBS | HEIGHT: 60 IN | BODY MASS INDEX: 27.66 KG/M2

## 2022-04-04 DIAGNOSIS — N18.31 STAGE 3A CHRONIC KIDNEY DISEASE (HCC): ICD-10-CM

## 2022-04-04 DIAGNOSIS — Z12.31 ENCOUNTER FOR SCREENING MAMMOGRAM FOR BREAST CANCER: Primary | ICD-10-CM

## 2022-04-04 PROCEDURE — 99213 OFFICE O/P EST LOW 20 MIN: CPT | Performed by: FAMILY MEDICINE

## 2022-04-04 NOTE — TELEPHONE ENCOUNTER
----- Message from Ani Lucero sent at 4/4/2022 10:55 AM EDT -----  Regarding: CARE GAP REQUEST  04/04/22 10:55 AM    Hello, our patient No patient name on file  has had DEXA Scan completed/performed  Please assist in updating the patient chart by pulling the Care Everywhere (CE) document  The date of service is 6/3/2021       Thank you,  Dorcas Lopez MA   [place]

## 2022-04-04 NOTE — PROGRESS NOTES
Assessment/Plan:    Stage 3a chronic kidney disease ([facility] 76 )  Lab Results   Component Value Date    EGFR 59 03/19/2022    EGFR >60 0 08/04/2016    CREATININE 0 99 03/19/2022    CREATININE 0 89 12/18/2020    CREATININE 0 73 08/04/2016   I tried to reassure the patient  Her creatinine is well within the normal range  However, her estimated GFR was 59  I explained to the patient that 60 is normal   Her last GFR was estimated at greater than 60  I explained that this was a computer diagnosis and not my diagnosis or I would have explained this to her  I did tell the patient that kidney function does declined as we get older  She wanted to know which she can do to improve her situation  Basically, the only thing she can do is avoid nephrotoxic agents, specifically NSAIDs  I wanted to stay away from Aleve, Advil, ibuprofen and no sort of medications  For pain, she can take Tylenol or extra-strength Tylenol  She does not have to follow a renal diet  She most certainly does not need to limit her protein intake  I also told her she does not need to stay away from an acids like Tums or Maalox  I did tell her that maintaining good blood pressure control is essential for preventing progression of chronic kidney disease  She should also stay well hydrated  I explained to the patient that the GFR is an estimation  It is based upon a formula  There are 2 formulas  The 1 that is in general use tends to underestimate renal function  I reassured her that she is not headed for dialysis  I monitor her renal function every time she gets blood work  That is routine  I answered her questions and her 's questions  I tried to reassure the patient  I am not worried and I do not think the patient should be worried         Diagnoses and all orders for this visit:    Encounter for screening mammogram for breast cancer    Stage 3a chronic kidney disease ([facility] 76 )          Subjective:      Patient ID: Gabby Solano is a 73 y o  female  This patient is a 81-year-old white female who presents to the office today accompanied by her   The patient apparently has [employer]  She saw all that she had been diagnosed with chronic kidney disease stage 3  She became very frightened  She looked up things about this on the Internet and saw that she should be on a low-protein diet, that she needs to avoid any antacid such as Tums and Maalox in such  She was accompanied to the office today by her   She had many questions  The following portions of the patient's history were reviewed and updated as appropriate: allergies, current medications, past family history, past medical history, past social history, past surgical history and problem list     Review of Systems   Genitourinary: Negative for dysuria, frequency and urgency  Objective:      /80 (BP Location: Left arm, Patient Position: Sitting, Cuff Size: Adult)   Pulse 59   Temp 98 2 °F (36 8 °C) (Tympanic)   Ht 5' (1 524 m)   Wt 63 9 kg (140 lb 14 4 oz)   SpO2 98%   BMI 27 52 kg/m²          Physical Exam  Vitals reviewed  Constitutional:       Comments: This is a 81-year-old white female who appears her stated age  She is well nourished and neat in appearance  Psychiatric:      Comments:  The patient is anxious and frightened

## 2022-04-04 NOTE — TELEPHONE ENCOUNTER
----- Message from Ani Nielson sent at 4/4/2022 10:55 AM EDT -----  Regarding: CARE GAP REQUEST  04/04/22 10:55 AM    Hello, our patient No patient name on file  has had Mammogram completed/performed  Please assist in updating the patient chart by pulling the Care Everywhere (CE) document  The date of service is 9/23/2021       Thank you,  Vimal Perez MA   [place]

## 2022-04-05 NOTE — ASSESSMENT & PLAN NOTE
Lab Results   Component Value Date    EGFR 59 03/19/2022    EGFR >60 0 08/04/2016    CREATININE 0 99 03/19/2022    CREATININE 0 89 12/18/2020    CREATININE 0 73 08/04/2016   I tried to reassure the patient  Her creatinine is well within the normal range  However, her estimated GFR was 59  I explained to the patient that 60 is normal   Her last GFR was estimated at greater than 60  I explained that this was a computer diagnosis and not my diagnosis or I would have explained this to her  I did tell the patient that kidney function does declined as we get older  She wanted to know which she can do to improve her situation  Basically, the only thing she can do is avoid nephrotoxic agents, specifically NSAIDs  I wanted to stay away from Aleve, Advil, ibuprofen and no sort of medications  For pain, she can take Tylenol or extra-strength Tylenol  She does not have to follow a renal diet  She most certainly does not need to limit her protein intake  I also told her she does not need to stay away from an acids like Tums or Maalox  I did tell her that maintaining good blood pressure control is essential for preventing progression of chronic kidney disease  She should also stay well hydrated  I explained to the patient that the GFR is an estimation  It is based upon a formula  There are 2 formulas  The 1 that is in general use tends to underestimate renal function  I reassured her that she is not headed for dialysis  I monitor her renal function every time she gets blood work  That is routine  I answered her questions and her 's questions  I tried to reassure the patient  I am not worried and I do not think the patient should be worried

## 2022-04-06 NOTE — TELEPHONE ENCOUNTER
Upon review of the In Basket request we were able to locate, review, and update the patient chart as requested for DEXA Scan and Mammogram     Any additional questions or concerns should be emailed to the Practice Liaisons via [email or web address] email, please do not reply via In Basket      Thank you  Oli Garrido

## 2022-07-23 DIAGNOSIS — I10 ESSENTIAL HYPERTENSION, BENIGN: ICD-10-CM

## 2022-07-23 RX ORDER — AMLODIPINE BESYLATE 5 MG/1
TABLET ORAL
Qty: 90 TABLET | Refills: 1 | Status: SHIPPED | OUTPATIENT
Start: 2022-07-23 | End: 2022-09-27 | Stop reason: DRUGHIGH

## 2022-09-06 ENCOUNTER — HOSPITAL ENCOUNTER (OUTPATIENT)
Dept: RADIOLOGY | Facility: HOSPITAL | Age: 67
Discharge: HOME | End: 2022-09-06
Attending: FAMILY MEDICINE
Payer: MEDICARE

## 2022-09-06 ENCOUNTER — OFFICE VISIT (OUTPATIENT)
Dept: FAMILY MEDICINE CLINIC | Facility: CLINIC | Age: 67
End: 2022-09-06
Payer: MEDICARE

## 2022-09-06 VITALS
TEMPERATURE: 99.1 F | SYSTOLIC BLOOD PRESSURE: 142 MMHG | BODY MASS INDEX: 27.61 KG/M2 | HEIGHT: 60 IN | HEART RATE: 71 BPM | DIASTOLIC BLOOD PRESSURE: 82 MMHG | OXYGEN SATURATION: 98 % | WEIGHT: 140.6 LBS

## 2022-09-06 DIAGNOSIS — J34.89 SINUS PAIN: ICD-10-CM

## 2022-09-06 DIAGNOSIS — J34.89 SINUS PAIN: Primary | ICD-10-CM

## 2022-09-06 PROCEDURE — 99213 OFFICE O/P EST LOW 20 MIN: CPT | Performed by: FAMILY MEDICINE

## 2022-09-06 PROCEDURE — 70210 X-RAY EXAM OF SINUSES: CPT

## 2022-09-06 RX ORDER — CELECOXIB 100 MG/1
100 CAPSULE ORAL 2 TIMES DAILY
Qty: 30 CAPSULE | Refills: 1 | Status: SHIPPED | OUTPATIENT
Start: 2022-09-06 | End: 2022-09-27 | Stop reason: ALTCHOICE

## 2022-09-06 NOTE — PROGRESS NOTES
Assessment/Plan:    Sinus pain  I told the patient that her symptoms and her physical exam are compatible with TMJ  I explained the diagnosis to her  She was concerned because her dentist was attributing to her sinuses  I told the patient I see no evidence of sinusitis and I see no indication for antibiotics  I ordered an x-ray of the paranasal sinuses, basically to reassure the patient  I think we should treat her for the TMJ  I asked her to stick to softer foods  I am going to put her on a course of NSAIDs  She was started on Celebrex 100 mg b i d  I advised the patient to take this for 2 weeks  If necessary, she can refill the prescription and take it for another 2 weeks  She should call me if no improvement or worsens  Diagnoses and all orders for this visit:    Sinus pain  -     celecoxib (CeleBREX) 100 mg capsule; Take 1 capsule (100 mg total) by mouth 2 (two) times a day  -     XR sinuses < 3 vw; Future          Subjective:      Patient ID: Espinoza Rosado is a 73 y o  female  This is a 67-year-old white female who presents to the office today complaining of jaw pain  The patient tells me that she went to [facility] in early July with her   While there, she started getting tinges in her right jaw  It would come and go  When she came home, at some point she ate something  She does not remember what it was but she tells me this caused a severe pain in her right jaw  She subsequently followed up with her dentist   She had a root canal done  She tells me that the pain never resolved  She saw her dentist again for follow-up visit  He x-rayed her and could not find anything wrong with her teeth  He attributed her symptoms to her sinuses  Patient tells me that the pain she gets in her right jaw will radiate into her right ear  She has no pain associated with hot or cold foods  She reports that her ear is clicking  She denies bruxism  She does not chew gum    She has not had any cold symptoms      The following portions of the patient's history were reviewed and updated as appropriate: allergies, current medications, past family history, past medical history, past social history, past surgical history and problem list     Review of Systems   Constitutional: Negative for chills and fever  HENT: Positive for ear pain and tinnitus  Negative for congestion, facial swelling, postnasal drip and rhinorrhea  Denies bruxism         Objective:      /82   Pulse 71   Temp 99 1 °F (37 3 °C) (Tympanic)   Ht 5' (1 524 m)   Wt 63 8 kg (140 lb 9 6 oz)   SpO2 98%   BMI 27 46 kg/m²          Physical Exam  Vitals reviewed  Constitutional:       Comments: This is a 60-year-old white female who appears her stated age  The patient is pleasant, cooperative, and in no distress  HENT:      Head: Normocephalic and atraumatic  Comments: There is no tenderness to palpation over the paranasal sinuses  Right Ear: Tympanic membrane, ear canal and external ear normal  There is no impacted cerumen  Left Ear: Tympanic membrane, ear canal and external ear normal  There is no impacted cerumen  Nose: Congestion present  Mouth/Throat:      Mouth: Mucous membranes are moist       Pharynx: Oropharynx is clear  No oropharyngeal exudate or posterior oropharyngeal erythema  Comments: A palpable and audible jaw click, actually quite loud, present only on the right  Dentition appeared to be in good repair  There was no swelling of the gums  Eyes:      General: No scleral icterus  Right eye: No discharge  Left eye: No discharge  Conjunctiva/sclera: Conjunctivae normal       Pupils: Pupils are equal, round, and reactive to light  Neck:      Comments: No thyromegaly  Cardiovascular:      Rate and Rhythm: Normal rate  Heart sounds: Normal heart sounds  No murmur heard  No friction rub  No gallop     Pulmonary:      Effort: Pulmonary effort is normal  No respiratory distress  Breath sounds: Normal breath sounds  No stridor  No wheezing, rhonchi or rales  Musculoskeletal:      Cervical back: Neck supple  Lymphadenopathy:      Cervical: No cervical adenopathy

## 2022-09-06 NOTE — ASSESSMENT & PLAN NOTE
I told the patient that her symptoms and her physical exam are compatible with TMJ  I explained the diagnosis to her  She was concerned because her dentist was attributing to her sinuses  I told the patient I see no evidence of sinusitis and I see no indication for antibiotics  I ordered an x-ray of the paranasal sinuses, basically to reassure the patient  I think we should treat her for the TMJ  I asked her to stick to softer foods  I am going to put her on a course of NSAIDs  She was started on Celebrex 100 mg b i d  I advised the patient to take this for 2 weeks  If necessary, she can refill the prescription and take it for another 2 weeks  She should call me if no improvement or worsens

## 2022-09-17 ENCOUNTER — APPOINTMENT (OUTPATIENT)
Dept: LAB | Facility: HOSPITAL | Age: 67
End: 2022-09-17
Attending: FAMILY MEDICINE
Payer: MEDICARE

## 2022-09-17 DIAGNOSIS — E78.2 MIXED HYPERLIPIDEMIA: ICD-10-CM

## 2022-09-17 DIAGNOSIS — E87.6 HYPOKALEMIA: ICD-10-CM

## 2022-09-17 LAB
ALBUMIN SERPL BCP-MCNC: 4 G/DL (ref 3.5–5)
ALP SERPL-CCNC: 75 U/L (ref 46–116)
ALT SERPL W P-5'-P-CCNC: 27 U/L (ref 12–78)
ANION GAP SERPL CALCULATED.3IONS-SCNC: 5 MMOL/L (ref 4–13)
AST SERPL W P-5'-P-CCNC: 23 U/L (ref 5–45)
BILIRUB SERPL-MCNC: 0.59 MG/DL (ref 0.2–1)
BUN SERPL-MCNC: 26 MG/DL (ref 5–25)
CALCIUM SERPL-MCNC: 9.4 MG/DL (ref 8.3–10.1)
CHLORIDE SERPL-SCNC: 102 MMOL/L (ref 96–108)
CO2 SERPL-SCNC: 31 MMOL/L (ref 21–32)
CREAT SERPL-MCNC: 1.11 MG/DL (ref 0.6–1.3)
GFR SERPL CREATININE-BSD FRML MDRD: 52 ML/MIN/1.73SQ M
GLUCOSE P FAST SERPL-MCNC: 97 MG/DL (ref 65–99)
LDLC SERPL DIRECT ASSAY-MCNC: 135 MG/DL (ref 0–100)
POTASSIUM SERPL-SCNC: 3.7 MMOL/L (ref 3.5–5.3)
PROT SERPL-MCNC: 7.1 G/DL (ref 6.4–8.4)
SODIUM SERPL-SCNC: 138 MMOL/L (ref 135–147)

## 2022-09-17 PROCEDURE — 36415 COLL VENOUS BLD VENIPUNCTURE: CPT

## 2022-09-17 PROCEDURE — 83721 ASSAY OF BLOOD LIPOPROTEIN: CPT

## 2022-09-17 PROCEDURE — 80053 COMPREHEN METABOLIC PANEL: CPT

## 2022-09-23 DIAGNOSIS — I10 HYPERTENSION, UNSPECIFIED TYPE: ICD-10-CM

## 2022-09-23 RX ORDER — LISINOPRIL AND HYDROCHLOROTHIAZIDE 20; 12.5 MG/1; MG/1
TABLET ORAL
Qty: 135 TABLET | Refills: 3 | Status: SHIPPED | OUTPATIENT
Start: 2022-09-23

## 2022-09-27 ENCOUNTER — OFFICE VISIT (OUTPATIENT)
Dept: FAMILY MEDICINE CLINIC | Facility: CLINIC | Age: 67
End: 2022-09-27
Payer: MEDICARE

## 2022-09-27 VITALS
DIASTOLIC BLOOD PRESSURE: 70 MMHG | OXYGEN SATURATION: 97 % | HEART RATE: 79 BPM | WEIGHT: 138.4 LBS | TEMPERATURE: 98.3 F | SYSTOLIC BLOOD PRESSURE: 150 MMHG | BODY MASS INDEX: 27.17 KG/M2 | HEIGHT: 60 IN

## 2022-09-27 DIAGNOSIS — E78.2 MIXED HYPERLIPIDEMIA: Primary | ICD-10-CM

## 2022-09-27 DIAGNOSIS — Z23 ENCOUNTER FOR IMMUNIZATION: ICD-10-CM

## 2022-09-27 DIAGNOSIS — I10 ESSENTIAL HYPERTENSION, BENIGN: ICD-10-CM

## 2022-09-27 DIAGNOSIS — N18.31 STAGE 3A CHRONIC KIDNEY DISEASE (HCC): ICD-10-CM

## 2022-09-27 PROCEDURE — 99214 OFFICE O/P EST MOD 30 MIN: CPT | Performed by: FAMILY MEDICINE

## 2022-09-27 PROCEDURE — 90662 IIV NO PRSV INCREASED AG IM: CPT

## 2022-09-27 PROCEDURE — G0009 ADMIN PNEUMOCOCCAL VACCINE: HCPCS

## 2022-09-27 PROCEDURE — G0008 ADMIN INFLUENZA VIRUS VAC: HCPCS

## 2022-09-27 PROCEDURE — 90677 PCV20 VACCINE IM: CPT

## 2022-09-27 RX ORDER — AMLODIPINE BESYLATE 10 MG/1
10 TABLET ORAL DAILY
Qty: 90 TABLET | Refills: 1 | Status: SHIPPED | OUTPATIENT
Start: 2022-09-27

## 2022-09-27 RX ORDER — ROSUVASTATIN CALCIUM 10 MG/1
10 TABLET, COATED ORAL DAILY
Qty: 90 TABLET | Refills: 1 | Status: SHIPPED | OUTPATIENT
Start: 2022-09-27

## 2022-09-27 NOTE — PROGRESS NOTES
Name: Maria M Hagan      : 1955      MRN: [id number]  Encounter Provider: Julio Blum DO  Encounter Date: 2022   Encounter department: [place]     1  Mixed hyperlipidemia  Assessment & Plan:  The patient's direct LDL cholesterol was 135  Her goal is less than 100  Her ACC/AHA cardiovascular risk is elevated risk is elevated at 10 6  As such, I do recommend the patient consider statin therapy  The patient was started on rosuvastatin 10 mg daily     Orders:  -     rosuvastatin (CRESTOR) 10 MG tablet; Take 1 tablet (10 mg total) by mouth daily    2  Essential hypertension, benign  Assessment & Plan:  Patient has difficult to control hypertension  She was very upset today  We had a very long discussion about her blood pressure  She realizes that hypertension will affect her renal function  She was upset about that as well  I increased her amlodipine to 10 mg daily  We did discuss potential side effects of the higher dose of amlodipine, namely lower extremity edema  Patient expressed an interest in biofeedback  I am not sure where this could be done within the area  I will have to look into this  I did review the patient's labs and note that her potassium was 3 7  Orders:  -     amLODIPine (NORVASC) 10 mg tablet; Take 1 tablet (10 mg total) by mouth daily    3  Encounter for immunization  -     Pneumococcal Conjugate Vaccine 20-valent (Pcv20)  -     influenza vaccine, high-dose, PF 0 7 mL (FLUZONE HIGH-DOSE)    4  Stage 3a chronic kidney disease ([facility] [address] )  Assessment & Plan:  Lab Results   Component Value Date    EGFR 52 2022    EGFR 59 2022    EGFR >60 0 2016    CREATININE 1 11 2022    CREATININE 0 99 2022    CREATININE 0 89 2020   Patient's creatinine is normal at 1 11 although her estimated GFR is slightly depressed at 52  I do agree that better blood pressure control is indicated             Subjective This is a 46-year-old white female who presents to the office today for her routine checkup  The patient tells me that she is following a low-sodium diet  She is exercising regularly  She exercises multiple times per day  She tries to keep her weight down  She is frustrated by her blood pressure  She tells me she does check her blood pressure regularly at home  She finds the systolic blood pressures also to be high  She tells me if she sits and relaxes for a while, she can check her blood pressure again and will be down  However, she tells me this is unrealistic and real life and she is frustrated by her inability to get her blood pressure down  Review of Systems   Constitutional: Negative for activity change, appetite change and unexpected weight change  Respiratory: Negative for cough, shortness of breath and wheezing  Cardiovascular: Negative for chest pain, palpitations and leg swelling  Gastrointestinal: Negative for abdominal distention, abdominal pain, blood in stool, constipation and diarrhea  Musculoskeletal: Negative for arthralgias and gait problem         Current Outpatient Medications on File Prior to Visit   Medication Sig    ascorbic acid (VITAMIN C) 250 MG tablet Take 500 mg by mouth 2 (two) times a day    Calcium Citrate-Vitamin D3 315-250 MG-UNIT TABS Take 1 tablet by mouth daily    lisinopril-hydrochlorothiazide (PRINZIDE,ZESTORETIC) 20-12 5 MG per tablet 1 TABLET IN MORNING THEN 1/2 TABLET IN EVENING    LORazepam (ATIVAN) 0 5 mg tablet Take 1 tablet (0 5 mg total) by mouth every 8 (eight) hours as needed for anxiety    metoprolol succinate (TOPROL-XL) 50 mg 24 hr tablet Take 1 tablet (50 mg total) by mouth daily    Multiple Vitamins-Minerals (CENTRUM SILVER 50+WOMEN) TABS Take by mouth    psyllium (METAMUCIL) 58 6 % packet Take 1 packet by mouth 2 (two) times a day 2 TSP DAILY     ASPIRIN 81 PO Take 81 mg by mouth daily  (Patient not taking: Reported on 9/27/2022)  potassium chloride (K-DUR,KLOR-CON) 20 mEq tablet Take 1 tablet (20 mEq total) by mouth daily for 5 days       Objective     /70   Pulse 79   Temp 98 3 °F (36 8 °C) (Tympanic)   Ht 5' (1 524 m)   Wt 62 8 kg (138 lb 6 4 oz)   SpO2 97%   BMI 27 03 kg/m²     Physical Exam  Vitals reviewed  Constitutional:       Comments: This is a 73-year-old white female who appears her stated age  She is well nourished, cooperative, and in no distress  HENT:      Head: Normocephalic and atraumatic  Right Ear: Tympanic membrane, ear canal and external ear normal  There is no impacted cerumen  Left Ear: Tympanic membrane, ear canal and external ear normal  There is no impacted cerumen  Mouth/Throat:      Mouth: Mucous membranes are moist       Pharynx: Oropharynx is clear  No oropharyngeal exudate or posterior oropharyngeal erythema  Eyes:      General: No scleral icterus  Right eye: No discharge  Left eye: No discharge  Conjunctiva/sclera: Conjunctivae normal       Pupils: Pupils are equal, round, and reactive to light  Neck:      Vascular: No carotid bruit  Comments: No thyromegaly was noted  Cardiovascular:      Rate and Rhythm: Normal rate and regular rhythm  Heart sounds: Normal heart sounds  No murmur heard  No friction rub  No gallop  Pulmonary:      Effort: Pulmonary effort is normal  No respiratory distress  Breath sounds: Normal breath sounds  No stridor  No wheezing, rhonchi or rales  Abdominal:      General: Bowel sounds are normal  There is no distension  Palpations: Abdomen is soft  There is no mass  Tenderness: There is no abdominal tenderness  There is no guarding  Musculoskeletal:      Cervical back: Neck supple  Lymphadenopathy:      Cervical: No cervical adenopathy  Psychiatric:         Mood and Affect: Mood normal          Behavior: Behavior normal          Thought Content:  Thought content normal          Judgment: Judgment normal        Florestine Hash, DO

## 2022-09-28 NOTE — ASSESSMENT & PLAN NOTE
Patient has difficult to control hypertension  She was very upset today  We had a very long discussion about her blood pressure  She realizes that hypertension will affect her renal function  She was upset about that as well  I increased her amlodipine to 10 mg daily  We did discuss potential side effects of the higher dose of amlodipine, namely lower extremity edema  Patient expressed an interest in biofeedback  I am not sure where this could be done within the area  I will have to look into this  I did review the patient's labs and note that her potassium was 3 7

## 2022-09-28 NOTE — ASSESSMENT & PLAN NOTE
The patient's direct LDL cholesterol was 135  Her goal is less than 100  Her ACC/AHA cardiovascular risk is elevated risk is elevated at 10 6  As such, I do recommend the patient consider statin therapy    The patient was started on rosuvastatin 10 mg daily

## 2022-09-28 NOTE — ASSESSMENT & PLAN NOTE
Lab Results   Component Value Date    EGFR 52 09/17/2022    EGFR 59 03/19/2022    EGFR >60 0 08/04/2016    CREATININE 1 11 09/17/2022    CREATININE 0 99 03/19/2022    CREATININE 0 89 12/18/2020   Patient's creatinine is normal at 1 11 although her estimated GFR is slightly depressed at 52  I do agree that better blood pressure control is indicated

## 2022-10-11 PROBLEM — Z00.00 WELCOME TO MEDICARE PREVENTIVE VISIT: Status: RESOLVED | Noted: 2022-03-30 | Resolved: 2022-10-11

## 2022-12-20 DIAGNOSIS — I10 ESSENTIAL HYPERTENSION, BENIGN: ICD-10-CM

## 2022-12-20 RX ORDER — METOPROLOL SUCCINATE 50 MG/1
TABLET, EXTENDED RELEASE ORAL
Qty: 90 TABLET | Refills: 3 | Status: SHIPPED | OUTPATIENT
Start: 2022-12-20

## 2022-12-21 ENCOUNTER — RA CDI HCC (OUTPATIENT)
Dept: OTHER | Facility: HOSPITAL | Age: 67
End: 2022-12-21

## 2022-12-21 NOTE — PROGRESS NOTES
[facility] [address]  coding opportunities       Chart reviewed, no opportunity found: CHART REVIEWED, NO OPPORTUNITY FOUND        Patients Insurance     Medicare Insurance: Medicare

## 2022-12-29 ENCOUNTER — TELEPHONE (OUTPATIENT)
Dept: ADMINISTRATIVE | Facility: OTHER | Age: 67
End: 2022-12-29

## 2022-12-29 ENCOUNTER — OFFICE VISIT (OUTPATIENT)
Dept: FAMILY MEDICINE CLINIC | Facility: CLINIC | Age: 67
End: 2022-12-29

## 2022-12-29 VITALS
OXYGEN SATURATION: 99 % | TEMPERATURE: 96.3 F | BODY MASS INDEX: 27.82 KG/M2 | DIASTOLIC BLOOD PRESSURE: 68 MMHG | HEART RATE: 64 BPM | HEIGHT: 60 IN | SYSTOLIC BLOOD PRESSURE: 134 MMHG | WEIGHT: 141.7 LBS

## 2022-12-29 DIAGNOSIS — N18.31 STAGE 3A CHRONIC KIDNEY DISEASE (HCC): ICD-10-CM

## 2022-12-29 DIAGNOSIS — E78.2 MIXED HYPERLIPIDEMIA: ICD-10-CM

## 2022-12-29 DIAGNOSIS — I10 ESSENTIAL HYPERTENSION, BENIGN: Primary | ICD-10-CM

## 2022-12-29 DIAGNOSIS — R53.83 OTHER FATIGUE: ICD-10-CM

## 2022-12-29 NOTE — ASSESSMENT & PLAN NOTE
Patient has hypertension which is now well controlled  Unfortunately, the patient is experiencing lower extremity edema at times from the increased dose of amlodipine  We discussed wearing compression stockings  She finds that this helps it but it causes swelling of her right knee then  When she does get the swelling, she finds it to be very uncomfortable  She tells me elevating her legs is not a good option when it occurs  She finds it has occurred when she is on her feet too much such as a trip she had to [employer] [place] football game and another trip she had to [place]  She tells me this occurred at times also at home  We discussed lowering the dose of amlodipine back to 5 mg a day and perhaps adding another agent to which she takes  Of course, these would come with other side effects  She would not tolerate a higher dose of metoprolol, given her heart rate of 64  We would have to likely start her on an older medication such as clonidine  For now, she tells me she wants to try it for at least another 3 months on the amlodipine  I told her if her symptoms become unbearable she should call me  I am concerned that her edema may worsen during the summer months

## 2022-12-29 NOTE — ASSESSMENT & PLAN NOTE
Lab Results   Component Value Date    EGFR 52 09/17/2022    EGFR 59 03/19/2022    EGFR >60 0 08/04/2016    CREATININE 1 11 09/17/2022    CREATININE 0 99 03/19/2022    CREATININE 0 89 12/18/2020   I plan to recheck the patient's creatinine and estimated GFR with her next office visit    Again, we need to try to keep this patient's blood pressure down to prevent progression of kidney disease

## 2022-12-29 NOTE — ASSESSMENT & PLAN NOTE
I ordered a CMP and lipid panel for her next office visit  She will continue rosuvastatin 10 mg daily    I reassured the patient that rosuvastatin has not caused any edema issues

## 2022-12-29 NOTE — TELEPHONE ENCOUNTER
----- Message from Ani Hernández sent at 12/29/2022  9:41 AM EST -----  Regarding: CARE GAP REQUEST  12/29/22 9:41 AM    Hello, our patient No patient name on file  has had Mammogram completed/performed  Please assist in updating the patient chart by pulling the Care Everywhere (CE) document  The date of service is 9/26/2022       Thank you,  [address] [facility]

## 2022-12-29 NOTE — PROGRESS NOTES
Name: Genesis Horvath      : 1955      MRN: [id number]  Encounter Provider: Kirsten Alonzo DO  Encounter Date: 2022   Encounter department: [facility]     1  Essential hypertension, benign  Assessment & Plan:  Patient has hypertension which is now well controlled  Unfortunately, the patient is experiencing lower extremity edema at times from the increased dose of amlodipine  We discussed wearing compression stockings  She finds that this helps it but it causes swelling of her right knee then  When she does get the swelling, she finds it to be very uncomfortable  She tells me elevating her legs is not a good option when it occurs  She finds it has occurred when she is on her feet too much such as a trip she had to [employer] football game and another trip she had to [place]  She tells me this occurred at times also at home  We discussed lowering the dose of amlodipine back to 5 mg a day and perhaps adding another agent to which she takes  Of course, these would come with other side effects  She would not tolerate a higher dose of metoprolol, given her heart rate of 64  We would have to likely start her on an older medication such as clonidine  For now, she tells me she wants to try it for at least another 3 months on the amlodipine  I told her if her symptoms become unbearable she should call me  I am concerned that her edema may worsen during the summer months  Orders:  -     Comprehensive metabolic panel; Future    2  Mixed hyperlipidemia  Assessment & Plan:  I ordered a CMP and lipid panel for her next office visit  She will continue rosuvastatin 10 mg daily  I reassured the patient that rosuvastatin has not caused any edema issues    Orders:  -     Lipid Panel with Direct LDL reflex; Future    3  Other fatigue  -     CBC and differential; Future  -     TSH, 3rd generation with Free T4 reflex; Future    4   Stage 3a chronic kidney disease [facility])  Assessment & Plan:  Lab Results   Component Value Date    EGFR 52 09/17/2022    EGFR 59 03/19/2022    EGFR >60 0 08/04/2016    CREATININE 1 11 09/17/2022    CREATININE 0 99 03/19/2022    CREATININE 0 89 12/18/2020   I plan to recheck the patient's creatinine and estimated GFR with her next office visit  Again, we need to try to keep this patient's blood pressure down to prevent progression of kidney disease           Subjective      This is a 50-year-old white female who presents to the office today for her routine checkup  The patient reports that at times she is experiencing ankle edema  She tells me when it occurs, it is quite uncomfortable  Since I last saw her, she took 2 trips with her   One was out to [place] to see a [employer] football game  The other trip was to [place]  She tells me both times, her ankles swelled up rather badly  She tells me it is happened at home at times as well  She tells me that if she elevates her legs the edema goes down but she does not want to sit around with her legs elevated  Review of Systems   Constitutional: Negative for activity change and appetite change  Respiratory: Negative for cough, shortness of breath and wheezing  Cardiovascular: Positive for leg swelling  Negative for chest pain and palpitations  Patient denies orthopnea and paroxysmal nocturnal dyspnea   Gastrointestinal: Negative for abdominal distention, abdominal pain, blood in stool, constipation, diarrhea and nausea         Current Outpatient Medications on File Prior to Visit   Medication Sig   • amLODIPine (NORVASC) 10 mg tablet Take 1 tablet (10 mg total) by mouth daily   • ascorbic acid (VITAMIN C) 250 MG tablet Take 500 mg by mouth 2 (two) times a day   • Calcium Citrate-Vitamin D3 315-250 MG-UNIT TABS Take 1 tablet by mouth daily   • lisinopril-hydrochlorothiazide (PRINZIDE,ZESTORETIC) 20-12 5 MG per tablet 1 TABLET IN MORNING THEN 1/2 TABLET IN EVENING   • LORazepam (ATIVAN) 0 5 mg tablet Take 1 tablet (0 5 mg total) by mouth every 8 (eight) hours as needed for anxiety   • metoprolol succinate (TOPROL-XL) 50 mg 24 hr tablet TAKE 1 TABLET BY MOUTH EVERY DAY   • Multiple Vitamins-Minerals (CENTRUM SILVER 50+WOMEN) TABS Take by mouth   • psyllium (METAMUCIL) 58 6 % packet Take 1 packet by mouth 2 (two) times a day 2 TSP DAILY    • rosuvastatin (CRESTOR) 10 MG tablet Take 1 tablet (10 mg total) by mouth daily   • ASPIRIN 81 PO Take 81 mg by mouth daily  (Patient not taking: Reported on 9/27/2022)   • potassium chloride (K-DUR,KLOR-CON) 20 mEq tablet Take 1 tablet (20 mEq total) by mouth daily for 5 days       Objective     /68   Pulse 64   Temp (!) 96 3 °F (35 7 °C) (Tympanic)   Ht 5' (1 524 m)   Wt 64 3 kg (141 lb 11 2 oz)   SpO2 99%   BMI 27 67 kg/m²     Physical Exam  Vitals reviewed  Constitutional:       Comments: This patient is a 78-year-old white female who appears her stated age  The patient is pleasant, cooperative, and in no distress  HENT:      Head: Normocephalic and atraumatic  Right Ear: Tympanic membrane, ear canal and external ear normal  There is no impacted cerumen  Left Ear: Tympanic membrane, ear canal and external ear normal  There is no impacted cerumen  Mouth/Throat:      Mouth: Mucous membranes are moist       Pharynx: Oropharynx is clear  No oropharyngeal exudate or posterior oropharyngeal erythema  Eyes:      General: No scleral icterus  Right eye: No discharge  Left eye: No discharge  Conjunctiva/sclera: Conjunctivae normal       Pupils: Pupils are equal, round, and reactive to light  Neck:      Comments: No thyromegaly  Cardiovascular:      Rate and Rhythm: Normal rate and regular rhythm  Heart sounds: Normal heart sounds  No murmur heard  No friction rub  No gallop  Pulmonary:      Effort: Pulmonary effort is normal  No respiratory distress        Breath sounds: Normal breath sounds  No stridor  No wheezing, rhonchi or rales  Abdominal:      General: Bowel sounds are normal  There is no distension  Palpations: Abdomen is soft  There is no mass  Tenderness: There is no abdominal tenderness  There is no guarding  Hernia: No hernia is present  Comments: No organomegaly   Musculoskeletal:      Cervical back: Neck supple  Lymphadenopathy:      Cervical: No cervical adenopathy  Psychiatric:         Mood and Affect: Mood normal          Behavior: Behavior normal          Thought Content:  Thought content normal          Judgment: Judgment normal      Extremities: Without cyanosis, clubbing, or edema  Vernell Doom, DO

## 2022-12-29 NOTE — TELEPHONE ENCOUNTER
Upon review of the In Basket request we were able to locate, review, and update the patient chart as requested for Mammogram     Any additional questions or concerns should be emailed to the Practice Liaisons via the appropriate education email address, please do not reply via In Basket      Thank you  Liyah Grimaldo

## 2023-03-20 DIAGNOSIS — E78.2 MIXED HYPERLIPIDEMIA: ICD-10-CM

## 2023-03-20 DIAGNOSIS — I10 ESSENTIAL HYPERTENSION, BENIGN: ICD-10-CM

## 2023-03-20 RX ORDER — AMLODIPINE BESYLATE 10 MG/1
10 TABLET ORAL DAILY
Qty: 90 TABLET | Refills: 3 | Status: SHIPPED | OUTPATIENT
Start: 2023-03-20

## 2023-03-20 RX ORDER — ROSUVASTATIN CALCIUM 10 MG/1
10 TABLET, COATED ORAL DAILY
Qty: 90 TABLET | Refills: 3 | Status: SHIPPED | OUTPATIENT
Start: 2023-03-20

## 2023-04-01 ENCOUNTER — APPOINTMENT (OUTPATIENT)
Dept: LAB | Facility: HOSPITAL | Age: 68
End: 2023-04-01
Attending: FAMILY MEDICINE

## 2023-04-01 DIAGNOSIS — E78.2 MIXED HYPERLIPIDEMIA: ICD-10-CM

## 2023-04-01 DIAGNOSIS — R53.83 OTHER FATIGUE: ICD-10-CM

## 2023-04-01 DIAGNOSIS — I10 ESSENTIAL HYPERTENSION, BENIGN: ICD-10-CM

## 2023-04-01 LAB
ALBUMIN SERPL BCP-MCNC: 4.6 G/DL (ref 3.5–5)
ALP SERPL-CCNC: 60 U/L (ref 34–104)
ALT SERPL W P-5'-P-CCNC: 17 U/L (ref 7–52)
ANION GAP SERPL CALCULATED.3IONS-SCNC: 9 MMOL/L (ref 4–13)
AST SERPL W P-5'-P-CCNC: 20 U/L (ref 13–39)
BASOPHILS # BLD AUTO: 0.05 THOUSANDS/ÂΜL (ref 0–0.1)
BASOPHILS NFR BLD AUTO: 1 % (ref 0–1)
BILIRUB SERPL-MCNC: 0.57 MG/DL (ref 0.2–1)
BUN SERPL-MCNC: 28 MG/DL (ref 5–25)
CALCIUM SERPL-MCNC: 10.1 MG/DL (ref 8.4–10.2)
CHLORIDE SERPL-SCNC: 102 MMOL/L (ref 96–108)
CHOLEST SERPL-MCNC: 145 MG/DL
CO2 SERPL-SCNC: 28 MMOL/L (ref 21–32)
CREAT SERPL-MCNC: 1.49 MG/DL (ref 0.6–1.3)
EOSINOPHIL # BLD AUTO: 0.19 THOUSAND/ÂΜL (ref 0–0.61)
EOSINOPHIL NFR BLD AUTO: 3 % (ref 0–6)
ERYTHROCYTE [DISTWIDTH] IN BLOOD BY AUTOMATED COUNT: 12.1 % (ref 11.6–15.1)
GFR SERPL CREATININE-BSD FRML MDRD: 36 ML/MIN/1.73SQ M
GLUCOSE P FAST SERPL-MCNC: 91 MG/DL (ref 65–99)
HCT VFR BLD AUTO: 34.3 % (ref 34.8–46.1)
HDLC SERPL-MCNC: 49 MG/DL
HGB BLD-MCNC: 11.2 G/DL (ref 11.5–15.4)
IMM GRANULOCYTES # BLD AUTO: 0.02 THOUSAND/UL (ref 0–0.2)
IMM GRANULOCYTES NFR BLD AUTO: 0 % (ref 0–2)
LDLC SERPL CALC-MCNC: 73 MG/DL (ref 0–100)
LYMPHOCYTES # BLD AUTO: 1.84 THOUSANDS/ÂΜL (ref 0.6–4.47)
LYMPHOCYTES NFR BLD AUTO: 25 % (ref 14–44)
MCH RBC QN AUTO: 30.4 PG (ref 26.8–34.3)
MCHC RBC AUTO-ENTMCNC: 32.7 G/DL (ref 31.4–37.4)
MCV RBC AUTO: 93 FL (ref 82–98)
MONOCYTES # BLD AUTO: 0.7 THOUSAND/ÂΜL (ref 0.17–1.22)
MONOCYTES NFR BLD AUTO: 9 % (ref 4–12)
NEUTROPHILS # BLD AUTO: 4.62 THOUSANDS/ÂΜL (ref 1.85–7.62)
NEUTS SEG NFR BLD AUTO: 62 % (ref 43–75)
NRBC BLD AUTO-RTO: 0 /100 WBCS
PLATELET # BLD AUTO: 266 THOUSANDS/UL (ref 149–390)
PMV BLD AUTO: 9.9 FL (ref 8.9–12.7)
POTASSIUM SERPL-SCNC: 3.5 MMOL/L (ref 3.5–5.3)
PROT SERPL-MCNC: 7.1 G/DL (ref 6.4–8.4)
RBC # BLD AUTO: 3.69 MILLION/UL (ref 3.81–5.12)
SODIUM SERPL-SCNC: 139 MMOL/L (ref 135–147)
TRIGL SERPL-MCNC: 117 MG/DL
TSH SERPL DL<=0.05 MIU/L-ACNC: 1.39 UIU/ML (ref 0.45–4.5)
WBC # BLD AUTO: 7.42 THOUSAND/UL (ref 4.31–10.16)

## 2023-04-03 ENCOUNTER — OFFICE VISIT (OUTPATIENT)
Dept: FAMILY MEDICINE CLINIC | Facility: CLINIC | Age: 68
End: 2023-04-03

## 2023-04-03 VITALS
HEIGHT: 60 IN | SYSTOLIC BLOOD PRESSURE: 151 MMHG | HEART RATE: 59 BPM | OXYGEN SATURATION: 99 % | BODY MASS INDEX: 27.58 KG/M2 | TEMPERATURE: 97.5 F | WEIGHT: 140.5 LBS | DIASTOLIC BLOOD PRESSURE: 69 MMHG

## 2023-04-03 DIAGNOSIS — E78.2 MIXED HYPERLIPIDEMIA: ICD-10-CM

## 2023-04-03 DIAGNOSIS — D53.9 NUTRITIONAL ANEMIA, UNSPECIFIED: ICD-10-CM

## 2023-04-03 DIAGNOSIS — Z78.0 POST-MENOPAUSE: ICD-10-CM

## 2023-04-03 DIAGNOSIS — Z00.00 MEDICARE ANNUAL WELLNESS VISIT, SUBSEQUENT: ICD-10-CM

## 2023-04-03 DIAGNOSIS — I10 ESSENTIAL HYPERTENSION, BENIGN: ICD-10-CM

## 2023-04-03 DIAGNOSIS — D64.9 ANEMIA, UNSPECIFIED TYPE: ICD-10-CM

## 2023-04-03 DIAGNOSIS — N18.32 STAGE 3B CHRONIC KIDNEY DISEASE (HCC): Primary | ICD-10-CM

## 2023-04-03 NOTE — PROGRESS NOTES
Assessment and Plan:     Problem List Items Addressed This Visit        Cardiovascular and Mediastinum    Essential hypertension, benign     Patient has hypertension  Blood pressure is less than optimally controlled  Good blood pressure control is essential to prevent progression of this patient's chronic renal disease  I do not think she would tolerate a higher with the beta-blocker dose  I considered increasing the dose of her lisinopril and hydrochlorothiazide  However, I am going to have the patient see nephrology  I am going to hold off until she sees nephrology to get their opinion  Genitourinary    Stage 3b chronic kidney disease [facility]) - Primary     Lab Results   Component Value Date    EGFR 36 04/01/2023    EGFR 52 09/17/2022    EGFR 59 03/19/2022    CREATININE 1 49 (H) 04/01/2023    CREATININE 1 11 09/17/2022    CREATININE 0 99 03/19/2022     I noted a deterioration in the patient's renal function  Her current creatinine is 1 49  BUN was noted to be 28  Estimated GFR declined to 36  At this point, going to recommend consultation with nephrology  A referral was placed to see Dr Shahida Rodriguez  Patient must avoid any nephrotoxic agents  Relevant Orders    Ambulatory Referral to Nephrology       Other    Mixed hyperlipidemia     I reviewed the patient's lipid panel total cholesterol is 145  Triglycerides are 117  HDL cholesterol is 49  LDL cholesterol 73  Goal is an LDL cholesterol less than 100  Continue rosuvastatin 10 mg daily         Anemia     Patient has a mild anemia which is new  Hemoglobin decreased to 11 2  She has normocytic normochromic indices  I ordered a ferritin, iron, total iron-binding capacity, reticulocyte, B12, and folate level  I also reviewed her colonoscopy which was from 2017  We will make further recommendations when I get her results           Relevant Orders    Ferritin    CBC and differential    Iron    TIBC    Retic Count    Vitamin B12    Folate Nutritional anemia, unspecified    Relevant Orders    Vitamin B12    Folate    Medicare annual wellness visit, subsequent    Post-menopause    Relevant Orders    DXA bone density spine hip and pelvis        Preventive health issues were discussed with patient, and age appropriate screening tests were ordered as noted in patient's After Visit Summary  Personalized health advice and appropriate referrals for health education or preventive services given if needed, as noted in patient's After Visit Summary  History of Present Illness:     Patient presents for a Medicare Wellness Visit    This is a 55-year-old white female who presents to the office today for her annual Medicare wellness exam as well as for her routine checkup  The patient is doing well and has no complaints  She tells me she is walking 5 times per week and often more  She tells me she checks her blood pressures at home  She tells me that they fluctuate and they frustrate her  She tells me that they are sometimes high and sometimes low  The patient did have blood work done in anticipation of her visit today  Patient Care Team:  Erlinda Valdovinos DO as PCP - General     Review of Systems:     Review of Systems   Constitutional: Negative for activity change and appetite change  Cardiovascular: Negative for chest pain, palpitations and leg swelling  Gastrointestinal: Negative for abdominal distention, abdominal pain, anal bleeding, blood in stool, constipation, diarrhea and nausea  Skin: Positive for color change  Reports facial flushing   Psychiatric/Behavioral: The patient is nervous/anxious           Problem List:     Patient Active Problem List   Diagnosis   • Mixed hyperlipidemia   • Generalized anxiety disorder   • Essential hypertension, benign   • Encounter for hepatitis C screening test for low risk patient   • Encounter for long-term (current) use of medications   • Epigastric pain   • Hypokalemia   • Sensorineural hearing loss (SNHL) of both ears   • Sinus pain   • Stage 3b chronic kidney disease (HCC)   • Anemia   • Nutritional anemia, unspecified   • Medicare annual wellness visit, subsequent   • Post-menopause      Past Medical and Surgical History:     Past Medical History:   Diagnosis Date   • AVM (arteriovenous malformation) of colon    • Diverticulosis    • Dyspepsia    • Generalized anxiety disorder     last assessed: 8/14/2017   • Hemorrhoids    • Hyperlipidemia     last assessed: 8/14/2017   • Hypertension     last assessed: 8/14/2017   • Interstitial cystitis    • Irritable bowel    • Mild chronic gastritis    • Mitral valve prolapse    • Osteoporosis    • Vertigo      Past Surgical History:   Procedure Laterality Date   • COLONOSCOPY  06/06/2017    4mm polyp was found in the proximal sigmoid colon  The polyp was hyperplastic  The polyp was removed w/ a cold bx forceps  Resection and retrieval were complete  Bleeding internal hemorrhoids were found during retroflexion and during endocopy  The hemorrhoids were Grade II (internal hemorrhoids that prolapse but reduce spontaneously)  • COLONOSCOPY W/ BIOPSIES  03/25/2009    bx's x6  Hemorrhoids  Two polyps  • EGD  04/03/2009    Mild antral gastritis  Esophageal island  • FLEXIBLE SIGMOIDOSCOPY  04/19/2018    digital rectal exam revealed 1cm firm anal mass palpated 0 to 1 cm from the anal verge  The exam was otherwise without abnormality to 12 cm with nonbloody stool     • JOINT REPLACEMENT      right knee   • KNEE SURGERY     • TONSILECTOMY AND ADNOIDECTOMY        Family History:     Family History   Problem Relation Age of Onset   • Hypertension Mother    • Skin cancer Mother    • Breast cancer Mother    • Hypertension Father    • Stroke Father    • No Known Problems Daughter       Social History:     Social History     Socioeconomic History   • Marital status: /Civil Union     Spouse name: None   • Number of children: None   • Years of education: None   • Highest education level: None   Occupational History   • None   Tobacco Use   • Smoking status: Never   • Smokeless tobacco: Never   Vaping Use   • Vaping Use: Never used   Substance and Sexual Activity   • Alcohol use: Yes     Comment: rarely   • Drug use: Never   • Sexual activity: None   Other Topics Concern   • None   Social History Narrative   • None     Social Determinants of Health     Financial Resource Strain: Low Risk    • Difficulty of Paying Living Expenses: Not hard at all   Food Insecurity: Not on file   Transportation Needs: No Transportation Needs   • Lack of Transportation (Medical): No   • Lack of Transportation (Non-Medical): No   Physical Activity: Not on file   Stress: Not on file   Social Connections: Not on file   Intimate Partner Violence: Not on file   Housing Stability: Not on file      Medications and Allergies:     Current Outpatient Medications   Medication Sig Dispense Refill   • amLODIPine (NORVASC) 10 mg tablet Take 1 tablet (10 mg total) by mouth daily 90 tablet 3   • ascorbic acid (VITAMIN C) 250 MG tablet Take 500 mg by mouth 2 (two) times a day     • Calcium Citrate-Vitamin D3 315-250 MG-UNIT TABS Take 1 tablet by mouth daily     • lisinopril-hydrochlorothiazide (PRINZIDE,ZESTORETIC) 20-12 5 MG per tablet 1 TABLET IN MORNING THEN 1/2 TABLET IN EVENING 135 tablet 3   • LORazepam (ATIVAN) 0 5 mg tablet Take 1 tablet (0 5 mg total) by mouth every 8 (eight) hours as needed for anxiety 150 tablet 0   • metoprolol succinate (TOPROL-XL) 50 mg 24 hr tablet TAKE 1 TABLET BY MOUTH EVERY DAY 90 tablet 3   • Multiple Vitamins-Minerals (CENTRUM SILVER 50+WOMEN) TABS Take by mouth     • psyllium (METAMUCIL) 58 6 % packet Take 1 packet by mouth 2 (two) times a day 2 TSP DAILY      • rosuvastatin (CRESTOR) 10 MG tablet Take 1 tablet (10 mg total) by mouth daily 90 tablet 3     No current facility-administered medications for this visit       Allergies   Allergen Reactions   • Medical Tape Rash Immunizations:     Immunization History   Administered Date(s) Administered   • COVID-19 MODERNA VACC 0 5 ML IM 03/24/2021, 04/22/2021, 12/03/2021   • COVID-19 Moderna Vac BIVALENT 12 Yr+ IM (BOOSTER ONLY) 0 5 ML 11/01/2022   • Fluzone Split Quad 0 5 mL 10/10/2019   • INFLUENZA 01/01/2008, 09/28/2014, 10/21/2015, 10/05/2016, 10/25/2017, 10/01/2018, 10/10/2019   • Influenza, high dose seasonal 0 7 mL 09/27/2022   • Influenza, injectable, quadrivalent, preservative free 0 5 mL 09/08/2020   • Influenza, recombinant, quadrivalent,injectable, preservative free 09/24/2021   • Pneumococcal Conjugate Vaccine 20-valent (Pcv20), Polysace 09/27/2022   • Pneumococcal Polysaccharide PPV23 01/01/1998   • Zoster Vaccine Recombinant 08/20/2020, 02/22/2021      Health Maintenance:         Topic Date Due   • Breast Cancer Screening: Mammogram  09/26/2023   • Colorectal Cancer Screening  06/06/2027   • Hepatitis C Screening  Completed     There are no preventive care reminders to display for this patient  Medicare Screening Tests and Risk Assessments:     Faye Landrum is here for her Subsequent Wellness visit  Last Medicare Wellness visit information reviewed, patient interviewed and updates made to the record today  Health Risk Assessment:   Patient rates overall health as good  Patient feels that their physical health rating is slightly better  Patient is satisfied with their life  Eyesight was rated as same  Hearing was rated as same  Patient feels that their emotional and mental health rating is same  Patients states they are never, rarely angry  Patient states they are sometimes unusually tired/fatigued  Pain experienced in the last 7 days has been none  Patient states that she has experienced no weight loss or gain in last 6 months  Depression Screening:   PHQ-2 Score: 0      Fall Risk Screening:    In the past year, patient has experienced: no history of falling in past year      Urinary Incontinence Screening:   Patient has not leaked urine accidently in the last six months  Home Safety:  Patient does not have trouble with stairs inside or outside of their home  Patient has working smoke alarms and has working carbon monoxide detector  Home safety hazards include: none  Nutrition:   Current diet is Low Cholesterol, Low Saturated Fat and Limited junk food  Low sodium    Medications:   Patient is currently taking over-the-counter supplements  OTC medications include: Calcium with vit d multi vitamin vit c  Patient is able to manage medications  Activities of Daily Living (ADLs)/Instrumental Activities of Daily Living (IADLs):   Walk and transfer into and out of bed and chair?: Yes  Dress and groom yourself?: Yes    Bathe or shower yourself?: Yes    Feed yourself?  Yes  Do your laundry/housekeeping?: Yes  Manage your money, pay your bills and track your expenses?: Yes  Make your own meals?: Yes    Do your own shopping?: Yes    Previous Hospitalizations:   Any hospitalizations or ED visits within the last 12 months?: No      Advance Care Planning:   Living will: No    Durable POA for healthcare: No    Advanced directive: No      Cognitive Screening:   Provider or family/friend/caregiver concerned regarding cognition?: No    PREVENTIVE SCREENINGS      Cardiovascular Screening:    General: Screening Not Indicated and History Lipid Disorder      Diabetes Screening:     General: Screening Current      Colorectal Cancer Screening:     General: Screening Current      Breast Cancer Screening:     General: Screening Current      Cervical Cancer Screening:    General: Screening Not Indicated      Osteoporosis Screening:      Due for: DXA Axial      Abdominal Aortic Aneurysm (AAA) Screening:        General: Screening Not Indicated      Lung Cancer Screening:     General: Screening Not Indicated      Hepatitis C Screening:    General: Screening Current    Screening, Brief Intervention, and Referral to Treatment (SBIRT)    Screening  Typical number of drinks in a day: 0  Typical number of drinks in a week: 0  Interpretation: Low risk drinking behavior  AUDIT-C Screenin) How often did you have a drink containing alcohol in the past year? never  2) How many drinks did you have on a typical day when you were drinking in the past year? 0  3) How often did you have 6 or more drinks on one occasion in the past year? never    AUDIT-C Score: 0  Interpretation: Score 0-2 (female): Negative screen for alcohol misuse    Single Item Drug Screening:  How often have you used an illegal drug (including marijuana) or a prescription medication for non-medical reasons in the past year? never    Single Item Drug Screen Score: 0  Interpretation: Negative screen for possible drug use disorder    No results found  Physical Exam:     /69   Pulse 59   Temp 97 5 °F (36 4 °C) (Tympanic)   Ht 5' (1 524 m)   Wt 63 7 kg (140 lb 8 oz)   SpO2 99%   BMI 27 44 kg/m²     Physical Exam  Vitals reviewed  Constitutional:       Comments: This patient is a 79-year-old white female who appears her stated age  The patient is pleasant, cooperative, and in no distress   HENT:      Head: Normocephalic and atraumatic  Right Ear: Tympanic membrane, ear canal and external ear normal  There is no impacted cerumen  Left Ear: Tympanic membrane, ear canal and external ear normal  There is no impacted cerumen  Mouth/Throat:      Mouth: Mucous membranes are moist       Pharynx: Oropharynx is clear  No oropharyngeal exudate or posterior oropharyngeal erythema  Eyes:      General: No scleral icterus  Right eye: No discharge  Left eye: No discharge  Conjunctiva/sclera: Conjunctivae normal       Pupils: Pupils are equal, round, and reactive to light  Neck:      Vascular: No carotid bruit  Comments: No thyromegaly is noted  Cardiovascular:      Rate and Rhythm: Normal rate and regular rhythm        Heart sounds: Normal heart sounds  No murmur heard  No friction rub  No gallop  Pulmonary:      Effort: Pulmonary effort is normal  No respiratory distress  Breath sounds: Normal breath sounds  No stridor  No wheezing, rhonchi or rales  Abdominal:      General: Bowel sounds are normal  There is no distension  Palpations: Abdomen is soft  There is no mass  Tenderness: There is no abdominal tenderness  There is no guarding  Comments: There is no hepatosplenomegaly   Musculoskeletal:      Cervical back: Neck supple  Lymphadenopathy:      Cervical: No cervical adenopathy     Psychiatric:      Comments: Patient is noted to be anxious with facial flushing        Extremities: Without cyanosis, clubbing, or edema    Nikko Street DO

## 2023-04-03 NOTE — PATIENT INSTRUCTIONS
Medicare Preventive Visit Patient Instructions  Thank you for completing your Welcome to Medicare Visit or Medicare Annual Wellness Visit today  Your next wellness visit will be due in one year (4/3/2024)  The screening/preventive services that you may require over the next 5-10 years are detailed below  Some tests may not apply to you based off risk factors and/or age  Screening tests ordered at today's visit but not completed yet may show as past due  Also, please note that scanned in results may not display below  Preventive Screenings:  Service Recommendations Previous Testing/Comments   Colorectal Cancer Screening  * Colonoscopy    * Fecal Occult Blood Test (FOBT)/Fecal Immunochemical Test (FIT)  * Fecal DNA/Cologuard Test  * Flexible Sigmoidoscopy Age: 39-70 years old   Colonoscopy: every 10 years (may be performed more frequently if at higher risk)  OR  FOBT/FIT: every 1 year  OR  Cologuard: every 3 years  OR  Sigmoidoscopy: every 5 years  Screening may be recommended earlier than age 39 if at higher risk for colorectal cancer  Also, an individualized decision between you and your healthcare provider will decide whether screening between the ages of 74-80 would be appropriate  Colonoscopy: 06/06/2017  FOBT/FIT: Not on file  Cologuard: Not on file  Sigmoidoscopy: Not on file    Screening Current     Breast Cancer Screening Age: 36 years old  Frequency: every 1-2 years  Not required if history of left and right mastectomy Mammogram: 09/26/2022    Screening Current   Cervical Cancer Screening Between the ages of 21-29, pap smear recommended once every 3 years  Between the ages of 33-67, can perform pap smear with HPV co-testing every 5 years     Recommendations may differ for women with a history of total hysterectomy, cervical cancer, or abnormal pap smears in past  Pap Smear: 11/05/2020    Screening Not Indicated   Hepatitis C Screening Once for adults born between 1945 and 1965  More frequently in patients at high risk for Hepatitis C Hep C Antibody: 08/19/2020    Screening Current   Diabetes Screening 1-2 times per year if you're at risk for diabetes or have pre-diabetes Fasting glucose: 91 mg/dL (4/1/2023)  A1C: No results in last 5 years (No results in last 5 years)  Screening Current   Cholesterol Screening Once every 5 years if you don't have a lipid disorder  May order more often based on risk factors  Lipid panel: 04/01/2023    Screening Not Indicated  History Lipid Disorder     Other Preventive Screenings Covered by Medicare:  1  Abdominal Aortic Aneurysm (AAA) Screening: covered once if your at risk  You're considered to be at risk if you have a family history of AAA  2  Lung Cancer Screening: covers low dose CT scan once per year if you meet all of the following conditions: (1) Age 50-69; (2) No signs or symptoms of lung cancer; (3) Current smoker or have quit smoking within the last 15 years; (4) You have a tobacco smoking history of at least 20 pack years (packs per day multiplied by number of years you smoked); (5) You get a written order from a healthcare provider  3  Glaucoma Screening: covered annually if you're considered high risk: (1) You have diabetes OR (2) Family history of glaucoma OR (3)  aged 48 and older OR (3)  American aged 72 and older  3  Osteoporosis Screening: covered every 2 years if you meet one of the following conditions: (1) You're estrogen deficient and at risk for osteoporosis based off medical history and other findings; (2) Have a vertebral abnormality; (3) On glucocorticoid therapy for more than 3 months; (4) Have primary hyperparathyroidism; (5) On osteoporosis medications and need to assess response to drug therapy  · Last bone density test (DXA Scan): 06/03/2021   5  HIV Screening: covered annually if you're between the age of 15-65  Also covered annually if you are younger than 13 and older than 72 with risk factors for HIV infection  For pregnant patients, it is covered up to 3 times per pregnancy  Immunizations:  Immunization Recommendations   Influenza Vaccine Annual influenza vaccination during flu season is recommended for all persons aged >= 6 months who do not have contraindications   Pneumococcal Vaccine   * Pneumococcal conjugate vaccine = PCV13 (Prevnar 13), PCV15 (Vaxneuvance), PCV20 (Prevnar 20)  * Pneumococcal polysaccharide vaccine = PPSV23 (Pneumovax) Adults 25-60 years old: 1-3 doses may be recommended based on certain risk factors  Adults 72 years old: 1-2 doses may be recommended based off what pneumonia vaccine you previously received   Hepatitis B Vaccine 3 dose series if at intermediate or high risk (ex: diabetes, end stage renal disease, liver disease)   Tetanus (Td) Vaccine - COST NOT COVERED BY MEDICARE PART B Following completion of primary series, a booster dose should be given every 10 years to maintain immunity against tetanus  Td may also be given as tetanus wound prophylaxis  Tdap Vaccine - COST NOT COVERED BY MEDICARE PART B Recommended at least once for all adults  For pregnant patients, recommended with each pregnancy  Shingles Vaccine (Shingrix) - COST NOT COVERED BY MEDICARE PART B  2 shot series recommended in those aged 48 and above     Health Maintenance Due:      Topic Date Due   • Breast Cancer Screening: Mammogram  09/26/2023   • Colorectal Cancer Screening  06/06/2027   • Hepatitis C Screening  Completed     Immunizations Due:  There are no preventive care reminders to display for this patient  Advance Directives   What are advance directives? Advance directives are legal documents that state your wishes and plans for medical care  These plans are made ahead of time in case you lose your ability to make decisions for yourself  Advance directives can apply to any medical decision, such as the treatments you want, and if you want to donate organs  What are the types of advance directives?   There are many types of advance directives, and each state has rules about how to use them  You may choose a combination of any of the following:  · Living will: This is a written record of the treatment you want  You can also choose which treatments you do not want, which to limit, and which to stop at a certain time  This includes surgery, medicine, IV fluid, and tube feedings  · Durable power of  for healthcare [facility]): This is a written record that states who you want to make healthcare choices for you when you are unable to make them for yourself  This person, called a proxy, is usually a family member or a friend  You may choose more than 1 proxy  · Do not resuscitate (DNR) order:  A DNR order is used in case your heart stops beating or you stop breathing  It is a request not to have certain forms of treatment, such as CPR  A DNR order may be included in other types of advance directives  · Medical directive: This covers the care that you want if you are in a coma, near death, or unable to make decisions for yourself  You can list the treatments you want for each condition  Treatment may include pain medicine, surgery, blood transfusions, dialysis, IV or tube feedings, and a ventilator (breathing machine)  · Values history: This document has questions about your views, beliefs, and how you feel and think about life  This information can help others choose the care that you would choose  Why are advance directives important? An advance directive helps you control your care  Although spoken wishes may be used, it is better to have your wishes written down  Spoken wishes can be misunderstood, or not followed  Treatments may be given even if you do not want them  An advance directive may make it easier for your family to make difficult choices about your care     Weight Management   Why it is important to manage your weight:  Being overweight increases your risk of health conditions such as heart disease, high blood pressure, type 2 diabetes, and certain types of cancer  It can also increase your risk for osteoarthritis, sleep apnea, and other respiratory problems  Aim for a slow, steady weight loss  Even a small amount of weight loss can lower your risk of health problems  How to lose weight safely:  A safe and healthy way to lose weight is to eat fewer calories and get regular exercise  You can lose up about 1 pound a week by decreasing the number of calories you eat by 500 calories each day  Healthy meal plan for weight management:  A healthy meal plan includes a variety of foods, contains fewer calories, and helps you stay healthy  A healthy meal plan includes the following:  · Eat whole-grain foods more often  A healthy meal plan should contain fiber  Fiber is the part of grains, fruits, and vegetables that is not broken down by your body  Whole-grain foods are healthy and provide extra fiber in your diet  Some examples of whole-grain foods are whole-wheat breads and pastas, oatmeal, brown rice, and bulgur  · Eat a variety of vegetables every day  Include dark, leafy greens such as spinach, kale, megan greens, and mustard greens  Eat yellow and orange vegetables such as carrots, sweet potatoes, and winter squash  · Eat a variety of fruits every day  Choose fresh or canned fruit (canned in its own juice or light syrup) instead of juice  Fruit juice has very little or no fiber  · Eat low-fat dairy foods  Drink fat-free (skim) milk or 1% milk  Eat fat-free yogurt and low-fat cottage cheese  Try low-fat cheeses such as mozzarella and other reduced-fat cheeses  · Choose meat and other protein foods that are low in fat  Choose beans or other legumes such as split peas or lentils  Choose fish, skinless poultry (chicken or turkey), or lean cuts of red meat (beef or pork)  Before you cook meat or poultry, cut off any visible fat  · Use less fat and oil  Try baking foods instead of frying them  Add less fat, such as margarine, sour cream, regular salad dressing and mayonnaise to foods  Eat fewer high-fat foods  Some examples of high-fat foods include french fries, doughnuts, ice cream, and cakes  · Eat fewer sweets  Limit foods and drinks that are high in sugar  This includes candy, cookies, regular soda, and sweetened drinks  Exercise:  Exercise at least 30 minutes per day on most days of the week  Some examples of exercise include walking, biking, dancing, and swimming  You can also fit in more physical activity by taking the stairs instead of the elevator or parking farther away from stores  Ask your healthcare provider about the best exercise plan for you  © Copyright [employer] 2018 Information is for End User's use only and may not be sold, redistributed or otherwise used for commercial purposes   All illustrations and images included in CareNotes® are the copyrighted property of [employer]  or [address] [place] [address]

## 2023-04-03 NOTE — ASSESSMENT & PLAN NOTE
Lab Results   Component Value Date    EGFR 36 04/01/2023    EGFR 52 09/17/2022    EGFR 59 03/19/2022    CREATININE 1 49 (H) 04/01/2023    CREATININE 1 11 09/17/2022    CREATININE 0 99 03/19/2022     I noted a deterioration in the patient's renal function  Her current creatinine is 1 49  BUN was noted to be 28  Estimated GFR declined to 36  At this point, going to recommend consultation with nephrology  A referral was placed to see Dr Michael Krishna  Patient must avoid any nephrotoxic agents

## 2023-04-04 NOTE — ASSESSMENT & PLAN NOTE
Patient has hypertension  Blood pressure is less than optimally controlled  Good blood pressure control is essential to prevent progression of this patient's chronic renal disease  I do not think she would tolerate a higher with the beta-blocker dose  I considered increasing the dose of her lisinopril and hydrochlorothiazide  However, I am going to have the patient see nephrology  I am going to hold off until she sees nephrology to get their opinion

## 2023-04-04 NOTE — ASSESSMENT & PLAN NOTE
I reviewed the patient's lipid panel total cholesterol is 145  Triglycerides are 117  HDL cholesterol is 49  LDL cholesterol 73  Goal is an LDL cholesterol less than 100    Continue rosuvastatin 10 mg daily

## 2023-04-04 NOTE — ASSESSMENT & PLAN NOTE
Patient has a mild anemia which is new  Hemoglobin decreased to 11 2  She has normocytic normochromic indices  I ordered a ferritin, iron, total iron-binding capacity, reticulocyte, B12, and folate level  I also reviewed her colonoscopy which was from 2017  We will make further recommendations when I get her results

## 2023-05-23 ENCOUNTER — CONSULT (OUTPATIENT)
Dept: NEPHROLOGY | Facility: CLINIC | Age: 68
End: 2023-05-23

## 2023-05-23 VITALS
OXYGEN SATURATION: 99 % | DIASTOLIC BLOOD PRESSURE: 68 MMHG | BODY MASS INDEX: 27.88 KG/M2 | SYSTOLIC BLOOD PRESSURE: 138 MMHG | HEART RATE: 67 BPM | WEIGHT: 142 LBS | HEIGHT: 60 IN

## 2023-05-23 DIAGNOSIS — I10 ESSENTIAL HYPERTENSION: ICD-10-CM

## 2023-05-23 DIAGNOSIS — N18.30 STAGE 3 CHRONIC KIDNEY DISEASE, UNSPECIFIED WHETHER STAGE 3A OR 3B CKD (HCC): Primary | ICD-10-CM

## 2023-05-23 DIAGNOSIS — E87.6 HYPOKALEMIA: ICD-10-CM

## 2023-05-23 DIAGNOSIS — R60.0 LOCALIZED EDEMA: ICD-10-CM

## 2023-05-23 DIAGNOSIS — F41.1 GENERALIZED ANXIETY DISORDER: ICD-10-CM

## 2023-05-23 RX ORDER — LISINOPRIL 30 MG/1
30 TABLET ORAL
Qty: 90 TABLET | Refills: 1 | Status: SHIPPED | OUTPATIENT
Start: 2023-05-23

## 2023-05-23 RX ORDER — HYDROCHLOROTHIAZIDE 12.5 MG/1
12.5 TABLET ORAL DAILY
Qty: 30 TABLET | Refills: 2 | Status: SHIPPED | OUTPATIENT
Start: 2023-05-23

## 2023-05-23 NOTE — PATIENT INSTRUCTIONS
Hydrate to 2 quart per day  Stop combination pill of lisinopril-hydrochlorothiazide  3  Start taking lisinopril 30mg at night  Change HCTZ just to 12 5mg in AM    4  Start taking BP 2-3 times a week  Take after 10 minutes of rest with legs uncrossed with minimal caffeine use  Keep a record, call if blood pressure greater than 150/90    5  Check blood tests in about a month  6  Check kidney ultrasound for baseline
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
## 2023-05-25 ENCOUNTER — CLINICAL SUPPORT (OUTPATIENT)
Dept: NEPHROLOGY | Facility: CLINIC | Age: 68
End: 2023-05-25

## 2023-05-25 VITALS — DIASTOLIC BLOOD PRESSURE: 70 MMHG | SYSTOLIC BLOOD PRESSURE: 140 MMHG

## 2023-05-25 DIAGNOSIS — N18.30 STAGE 3 CHRONIC KIDNEY DISEASE, UNSPECIFIED WHETHER STAGE 3A OR 3B CKD (HCC): Primary | ICD-10-CM

## 2023-06-02 PROBLEM — Z00.00 MEDICARE ANNUAL WELLNESS VISIT, SUBSEQUENT: Status: RESOLVED | Noted: 2023-04-03 | Resolved: 2023-06-02

## 2023-06-14 ENCOUNTER — HOSPITAL ENCOUNTER (OUTPATIENT)
Dept: ULTRASOUND IMAGING | Facility: HOSPITAL | Age: 68
Discharge: HOME | End: 2023-06-14
Attending: INTERNAL MEDICINE
Payer: MEDICARE

## 2023-06-14 ENCOUNTER — APPOINTMENT (OUTPATIENT)
Dept: LAB | Facility: HOSPITAL | Age: 68
End: 2023-06-14
Payer: MEDICARE

## 2023-06-14 DIAGNOSIS — N18.30 STAGE 3 CHRONIC KIDNEY DISEASE, UNSPECIFIED WHETHER STAGE 3A OR 3B CKD (HCC): ICD-10-CM

## 2023-06-14 DIAGNOSIS — N18.30 STAGE 3 CHRONIC KIDNEY DISEASE, UNSPECIFIED WHETHER STAGE 3A OR 3B CKD (HCC): Primary | ICD-10-CM

## 2023-06-14 DIAGNOSIS — N18.32 STAGE 3B CHRONIC KIDNEY DISEASE (HCC): Primary | ICD-10-CM

## 2023-06-14 LAB
ALBUMIN SERPL BCP-MCNC: 4.5 G/DL (ref 3.5–5)
ALP SERPL-CCNC: 59 U/L (ref 34–104)
ALT SERPL W P-5'-P-CCNC: 23 U/L (ref 7–52)
ANA SER QL IA: POSITIVE
ANION GAP SERPL CALCULATED.3IONS-SCNC: 9 MMOL/L (ref 4–13)
AST SERPL W P-5'-P-CCNC: 26 U/L (ref 13–39)
BACTERIA UR QL AUTO: ABNORMAL /HPF
BILIRUB SERPL-MCNC: 0.45 MG/DL (ref 0.2–1)
BILIRUB UR QL STRIP: NEGATIVE
BUN SERPL-MCNC: 22 MG/DL (ref 5–25)
CALCIUM SERPL-MCNC: 10 MG/DL (ref 8.4–10.2)
CHLORIDE SERPL-SCNC: 102 MMOL/L (ref 96–108)
CLARITY UR: CLEAR
CO2 SERPL-SCNC: 28 MMOL/L (ref 21–32)
COLOR UR: YELLOW
CREAT SERPL-MCNC: 1.25 MG/DL (ref 0.6–1.3)
CREAT UR-MCNC: 33.9 MG/DL
CREAT UR-MCNC: 36.7 MG/DL
GFR SERPL CREATININE-BSD FRML MDRD: 44 ML/MIN/1.73SQ M
GLUCOSE SERPL-MCNC: 91 MG/DL (ref 65–140)
GLUCOSE UR STRIP-MCNC: NEGATIVE MG/DL
HGB UR QL STRIP.AUTO: NEGATIVE
KETONES UR STRIP-MCNC: NEGATIVE MG/DL
LEUKOCYTE ESTERASE UR QL STRIP: NEGATIVE
MAGNESIUM SERPL-MCNC: 1.6 MG/DL (ref 1.9–2.7)
MICROALBUMIN UR-MCNC: 9.3 MG/L (ref 0–20)
MICROALBUMIN/CREAT 24H UR: 27 MG/G CREATININE (ref 0–30)
NITRITE UR QL STRIP: NEGATIVE
NON-SQ EPI CELLS URNS QL MICRO: ABNORMAL /HPF
PH UR STRIP.AUTO: 6 [PH]
PHOSPHATE SERPL-MCNC: 3.1 MG/DL (ref 2.3–4.1)
POTASSIUM SERPL-SCNC: 3.7 MMOL/L (ref 3.5–5.3)
PROT SERPL-MCNC: 7.1 G/DL (ref 6.4–8.4)
PROT UR STRIP-MCNC: NEGATIVE MG/DL
PROT UR-MCNC: <4 MG/DL
PTH-INTACT SERPL-MCNC: 38.6 PG/ML (ref 12–88)
RBC #/AREA URNS AUTO: ABNORMAL /HPF
SODIUM SERPL-SCNC: 139 MMOL/L (ref 135–147)
SP GR UR STRIP.AUTO: <=1.005 (ref 1–1.03)
URATE SERPL-MCNC: 6.7 MG/DL (ref 2–7.5)
UROBILINOGEN UR QL STRIP.AUTO: 0.2 E.U./DL
WBC #/AREA URNS AUTO: ABNORMAL /HPF

## 2023-06-14 PROCEDURE — 84244 ASSAY OF RENIN: CPT

## 2023-06-14 PROCEDURE — 82570 ASSAY OF URINE CREATININE: CPT

## 2023-06-14 PROCEDURE — 84100 ASSAY OF PHOSPHORUS: CPT

## 2023-06-14 PROCEDURE — 81001 URINALYSIS AUTO W/SCOPE: CPT

## 2023-06-14 PROCEDURE — 86225 DNA ANTIBODY NATIVE: CPT

## 2023-06-14 PROCEDURE — 83735 ASSAY OF MAGNESIUM: CPT

## 2023-06-14 PROCEDURE — 84165 PROTEIN E-PHORESIS SERUM: CPT

## 2023-06-14 PROCEDURE — 84166 PROTEIN E-PHORESIS/URINE/CSF: CPT

## 2023-06-14 PROCEDURE — 82043 UR ALBUMIN QUANTITATIVE: CPT

## 2023-06-14 PROCEDURE — 80053 COMPREHEN METABOLIC PANEL: CPT | Performed by: NURSE PRACTITIONER

## 2023-06-14 PROCEDURE — 84550 ASSAY OF BLOOD/URIC ACID: CPT

## 2023-06-14 PROCEDURE — 36415 COLL VENOUS BLD VENIPUNCTURE: CPT

## 2023-06-14 PROCEDURE — 86039 ANTINUCLEAR ANTIBODIES (ANA): CPT

## 2023-06-14 PROCEDURE — 76770 US EXAM ABDO BACK WALL COMP: CPT

## 2023-06-14 PROCEDURE — 83970 ASSAY OF PARATHORMONE: CPT

## 2023-06-14 PROCEDURE — 86235 NUCLEAR ANTIGEN ANTIBODY: CPT

## 2023-06-14 PROCEDURE — 84156 ASSAY OF PROTEIN URINE: CPT

## 2023-06-14 PROCEDURE — 83835 ASSAY OF METANEPHRINES: CPT

## 2023-06-14 PROCEDURE — 86038 ANTINUCLEAR ANTIBODIES: CPT

## 2023-06-14 PROCEDURE — 82088 ASSAY OF ALDOSTERONE: CPT

## 2023-06-15 DIAGNOSIS — R76.8 ANA POSITIVE: Primary | ICD-10-CM

## 2023-06-15 LAB
ANA HOMOGEN SER QL IF: NORMAL
ANA HOMOGEN TITR SER: NORMAL {TITER}

## 2023-06-16 ENCOUNTER — TELEPHONE (OUTPATIENT)
Dept: OTHER | Facility: OTHER | Age: 68
End: 2023-06-16

## 2023-06-16 LAB
ALBUMIN SERPL ELPH-MCNC: 4.57 G/DL (ref 3.2–5.1)
ALBUMIN SERPL ELPH-MCNC: 64.4 % (ref 48–70)
ALBUMIN UR ELPH-MCNC: 100 %
ALPHA1 GLOB MFR UR ELPH: 0 %
ALPHA1 GLOB SERPL ELPH-MCNC: 0.33 G/DL (ref 0.15–0.47)
ALPHA1 GLOB SERPL ELPH-MCNC: 4.7 % (ref 1.8–7)
ALPHA2 GLOB MFR UR ELPH: 0 %
ALPHA2 GLOB SERPL ELPH-MCNC: 0.63 G/DL (ref 0.42–1.04)
ALPHA2 GLOB SERPL ELPH-MCNC: 8.9 % (ref 5.9–14.9)
B-GLOBULIN MFR UR ELPH: 0 %
BETA GLOB ABNORMAL SERPL ELPH-MCNC: 0.43 G/DL (ref 0.31–0.57)
BETA1 GLOB SERPL ELPH-MCNC: 6 % (ref 4.7–7.7)
BETA2 GLOB SERPL ELPH-MCNC: 4.6 % (ref 3.1–7.9)
BETA2+GAMMA GLOB SERPL ELPH-MCNC: 0.33 G/DL (ref 0.2–0.58)
CENTROMERE B IGG SER QL LINE BLOT: NEGATIVE
CHROMATIN AB SERPL-ACNC: NEGATIVE
DSDNA AB SER-ACNC: <4 IU/ML
ENA JO1 AB SER IA-ACNC: NEGATIVE
ENA RNP AB SER IA-ACNC: NEGATIVE
ENA SCL70 IGG SER IA-ACNC: POSITIVE
ENA SM AB SER IA-ACNC: NEGATIVE
ENA SM+RNP IGG SER-ACNC: NEGATIVE
ENA SS-A AB SER IA-ACNC: NEGATIVE
ENA SS-B AB SER IA-ACNC: NEGATIVE
GAMMA GLOB ABNORMAL SERPL ELPH-MCNC: 0.81 G/DL (ref 0.4–1.66)
GAMMA GLOB MFR UR ELPH: 0 %
GAMMA GLOB SERPL ELPH-MCNC: 11.4 % (ref 6.9–22.3)
IGG/ALB SER: 1.81 {RATIO} (ref 1.1–1.8)
PROT PATTERN SERPL ELPH-IMP: ABNORMAL
PROT PATTERN UR ELPH-IMP: NORMAL
PROT SERPL-MCNC: 7.1 G/DL (ref 6.4–8.2)
PROT UR-MCNC: 6 MG/DL
RIBOSOMAL P AB SER IA-ACNC: NEGATIVE

## 2023-06-16 PROCEDURE — 84166 PROTEIN E-PHORESIS/URINE/CSF: CPT | Performed by: PATHOLOGY

## 2023-06-16 PROCEDURE — 84165 PROTEIN E-PHORESIS SERUM: CPT | Performed by: PATHOLOGY

## 2023-06-16 NOTE — TELEPHONE ENCOUNTER
Pt calling to follow up on lab results  Please contact to discuss  Requests a call to mobile phone number

## 2023-06-21 LAB
METANEPH FREE SERPL-MCNC: 22.1 PG/ML (ref 0–88)
NORMETANEPHRINE SERPL-MCNC: 103.5 PG/ML (ref 0–285.2)

## 2023-06-26 ENCOUNTER — RA CDI HCC (OUTPATIENT)
Dept: OTHER | Facility: HOSPITAL | Age: 68
End: 2023-06-26

## 2023-06-26 LAB
ALDOST SERPL-MCNC: 3.9 NG/DL (ref 0–30)
ALDOST/RENIN PLAS-RTO: 1.3 {RATIO} (ref 0–30)
RENIN PLAS-CCNC: 3.01 NG/ML/HR (ref 0.17–5.38)

## 2023-07-03 ENCOUNTER — OFFICE VISIT (OUTPATIENT)
Dept: FAMILY MEDICINE CLINIC | Facility: CLINIC | Age: 68
End: 2023-07-03
Payer: MEDICARE

## 2023-07-03 VITALS
HEART RATE: 60 BPM | SYSTOLIC BLOOD PRESSURE: 140 MMHG | WEIGHT: 139.6 LBS | OXYGEN SATURATION: 100 % | HEIGHT: 60 IN | BODY MASS INDEX: 27.41 KG/M2 | TEMPERATURE: 97.4 F | DIASTOLIC BLOOD PRESSURE: 68 MMHG

## 2023-07-03 DIAGNOSIS — E78.2 MIXED HYPERLIPIDEMIA: ICD-10-CM

## 2023-07-03 DIAGNOSIS — J30.1 SEASONAL ALLERGIC RHINITIS DUE TO POLLEN: ICD-10-CM

## 2023-07-03 DIAGNOSIS — I10 ESSENTIAL HYPERTENSION, BENIGN: Primary | ICD-10-CM

## 2023-07-03 DIAGNOSIS — F41.1 GENERALIZED ANXIETY DISORDER: ICD-10-CM

## 2023-07-03 DIAGNOSIS — Z79.899 ENCOUNTER FOR LONG-TERM (CURRENT) USE OF MEDICATIONS: ICD-10-CM

## 2023-07-03 PROCEDURE — 99214 OFFICE O/P EST MOD 30 MIN: CPT | Performed by: FAMILY MEDICINE

## 2023-07-03 RX ORDER — LORAZEPAM 0.5 MG/1
0.5 TABLET ORAL EVERY 8 HOURS PRN
Qty: 150 TABLET | Refills: 1 | Status: SHIPPED | OUTPATIENT
Start: 2023-07-03

## 2023-07-03 NOTE — ASSESSMENT & PLAN NOTE
Direct LDL cholesterol was ordered. The patient's goal LDL cholesterol is less than 100. Patient will continue rosuvastatin 10 mg daily.

## 2023-07-03 NOTE — ASSESSMENT & PLAN NOTE
I queried the [place] prescription drug monitoring program.  There were no red flags and it was safe to proceed.   I refilled her prescription for lorazepam. No

## 2023-07-03 NOTE — PROGRESS NOTES
Name: Marlon Hernandes      : 1955      MRN: [id number]  Encounter Provider: Flower Luevano DO  Encounter Date: 7/3/2023   Encounter department: [address] [place] [address]     1. Essential hypertension, benign  Assessment & Plan:  Patient has hypertension. She has a strong component of whitecoat hypertension. She tells me she checks her blood pressures regularly at home and often finds her blood pressure is running around 565 systolic. She did not have a logbook of her blood pressures. I do not know what her diastolic typically runs. I did not make any change with her medication today. She will continue amlodipine 10 mg daily, lisinopril 30 mg daily, metoprolol 50 mg daily, and hydrochlorothiazide 12.5 mg daily. A CMP was ordered. We discussed her ankle edema is likely from the amlodipine. 2. Generalized anxiety disorder  Assessment & Plan:  I queried the [place] prescription drug monitoring program.  There were no red flags and it was safe to proceed. I refilled her prescription for lorazepam.    Orders:  -     LORazepam (ATIVAN) 0.5 mg tablet; Take 1 tablet (0.5 mg total) by mouth every 8 (eight) hours as needed for anxiety    3. Mixed hyperlipidemia  Assessment & Plan:  Direct LDL cholesterol was ordered. The patient's goal LDL cholesterol is less than 100. Patient will continue rosuvastatin 10 mg daily. Orders:  -     LDL cholesterol, direct; Future; Expected date: 2024  -     Comprehensive metabolic panel; Future; Expected date: 2024    4. Encounter for long-term (current) use of medications  -     CBC and differential; Future; Expected date: 2024    5. Seasonal allergic rhinitis due to pollen  Assessment & Plan:  Recommended that the patient take loratadine 10 mg daily           Subjective      This is a 52-year-old white female who presents to the office today for her routine checkup. The patient is doing well.   Her main complaint today is postnasal drip which tends to come and go. She has some nasal congestion as well. She reports that she stays active. She is retired now. She uses an elliptical and treadmill regularly for exercise. She has been compliant with use of medication. Review of Systems   Constitutional: Negative for activity change and appetite change. HENT: Positive for congestion and postnasal drip. Respiratory: Negative for cough and shortness of breath. Cardiovascular: Positive for leg swelling. Negative for chest pain and palpitations. Gastrointestinal: Negative for abdominal distention, abdominal pain, blood in stool, constipation, diarrhea and nausea. Current Outpatient Medications on File Prior to Visit   Medication Sig   • amLODIPine (NORVASC) 10 mg tablet Take 1 tablet (10 mg total) by mouth daily   • ascorbic acid (VITAMIN C) 250 MG tablet Take 500 mg by mouth 2 (two) times a day   • Calcium Citrate-Vitamin D3 315-250 MG-UNIT TABS Take 1 tablet by mouth daily   • hydrochlorothiazide (HYDRODIURIL) 12.5 mg tablet Take 1 tablet (12.5 mg total) by mouth daily   • lisinopril (ZESTRIL) 30 mg tablet Take 1 tablet (30 mg total) by mouth daily at bedtime   • metoprolol succinate (TOPROL-XL) 50 mg 24 hr tablet TAKE 1 TABLET BY MOUTH EVERY DAY   • Multiple Vitamins-Minerals (CENTRUM SILVER 50+WOMEN) TABS Take by mouth   • psyllium (METAMUCIL) 58.6 % packet Take 1 packet by mouth 2 (two) times a day 2 TSP DAILY    • rosuvastatin (CRESTOR) 10 MG tablet Take 1 tablet (10 mg total) by mouth daily   • [DISCONTINUED] LORazepam (ATIVAN) 0.5 mg tablet Take 1 tablet (0.5 mg total) by mouth every 8 (eight) hours as needed for anxiety       Objective     /68   Pulse 60   Temp (!) 97.4 °F (36.3 °C) (Tympanic)   Ht 5' (1.524 m)   Wt 63.3 kg (139 lb 9.6 oz)   SpO2 100%   BMI 27.26 kg/m²     Physical Exam  Vitals reviewed.    Constitutional:       Comments: Patient is a 41-year-old white male who appears her stated age. She is pleasant, cooperative, and in no distress   HENT:      Head: Normocephalic and atraumatic. Right Ear: Tympanic membrane, ear canal and external ear normal. There is no impacted cerumen. Left Ear: Tympanic membrane, ear canal and external ear normal. There is no impacted cerumen. Nose: Congestion present. No rhinorrhea. Comments: Positive for nasal congestion with pale bluish colored nasal turbinates     Mouth/Throat:      Mouth: Mucous membranes are moist.      Pharynx: Oropharynx is clear. No oropharyngeal exudate or posterior oropharyngeal erythema. Eyes:      General: No scleral icterus. Right eye: No discharge. Left eye: No discharge. Conjunctiva/sclera: Conjunctivae normal.      Pupils: Pupils are equal, round, and reactive to light. Neck:      Comments: No thyromegaly is noted  Cardiovascular:      Rate and Rhythm: Normal rate and regular rhythm. Heart sounds: Normal heart sounds. No murmur heard. No friction rub. No gallop. Pulmonary:      Effort: Pulmonary effort is normal. No respiratory distress. Breath sounds: Normal breath sounds. No stridor. No wheezing, rhonchi or rales. Abdominal:      General: Bowel sounds are normal. There is no distension. Palpations: Abdomen is soft. There is no mass. Tenderness: There is no abdominal tenderness. There is no guarding. Comments: No hepatosplenomegaly   Musculoskeletal:      Cervical back: Neck supple. Lymphadenopathy:      Cervical: No cervical adenopathy. Psychiatric:         Mood and Affect: Mood normal.         Behavior: Behavior normal.         Thought Content:  Thought content normal.         Judgment: Judgment normal.     Extremities: Without cyanosis, clubbing, or edema  Manju Adkins DO

## 2023-07-03 NOTE — ASSESSMENT & PLAN NOTE
Patient has hypertension. She has a strong component of whitecoat hypertension. She tells me she checks her blood pressures regularly at home and often finds her blood pressure is running around 978 systolic. She did not have a logbook of her blood pressures. I do not know what her diastolic typically runs. I did not make any change with her medication today. She will continue amlodipine 10 mg daily, lisinopril 30 mg daily, metoprolol 50 mg daily, and hydrochlorothiazide 12.5 mg daily. A CMP was ordered. We discussed her ankle edema is likely from the amlodipine.

## 2023-07-31 ENCOUNTER — OFFICE VISIT (OUTPATIENT)
Dept: NEPHROLOGY | Facility: CLINIC | Age: 68
End: 2023-07-31
Payer: MEDICARE

## 2023-07-31 VITALS
BODY MASS INDEX: 28.07 KG/M2 | SYSTOLIC BLOOD PRESSURE: 138 MMHG | WEIGHT: 143 LBS | HEART RATE: 70 BPM | OXYGEN SATURATION: 99 % | HEIGHT: 60 IN | DIASTOLIC BLOOD PRESSURE: 60 MMHG

## 2023-07-31 DIAGNOSIS — R60.0 LOWER EXTREMITY EDEMA: ICD-10-CM

## 2023-07-31 DIAGNOSIS — E83.42 HYPOMAGNESEMIA: ICD-10-CM

## 2023-07-31 DIAGNOSIS — N18.32 STAGE 3B CHRONIC KIDNEY DISEASE (HCC): Primary | ICD-10-CM

## 2023-07-31 DIAGNOSIS — R76.8 ANA POSITIVE: ICD-10-CM

## 2023-07-31 DIAGNOSIS — I10 ESSENTIAL HYPERTENSION: ICD-10-CM

## 2023-07-31 PROCEDURE — 99215 OFFICE O/P EST HI 40 MIN: CPT | Performed by: INTERNAL MEDICINE

## 2023-07-31 RX ORDER — AMLODIPINE BESYLATE 5 MG/1
5 TABLET ORAL DAILY
Qty: 90 TABLET | Refills: 1 | Status: SHIPPED | OUTPATIENT
Start: 2023-07-31

## 2023-07-31 RX ORDER — HYDROCHLOROTHIAZIDE 25 MG/1
25 TABLET ORAL DAILY
Qty: 90 TABLET | Refills: 1 | Status: SHIPPED | OUTPATIENT
Start: 2023-07-31

## 2023-07-31 RX ORDER — MAGNESIUM OXIDE 400 MG/1
400 TABLET ORAL DAILY
Qty: 90 TABLET | Refills: 1 | Status: SHIPPED | OUTPATIENT
Start: 2023-07-31

## 2023-07-31 NOTE — PROGRESS NOTES
Assessment & Plan:    1. Stage 3b chronic kidney disease (HCC)  -     amLODIPine (NORVASC) 5 mg tablet; Take 1 tablet (5 mg total) by mouth daily  -     hydrochlorothiazide (HYDRODIURIL) 25 mg tablet; Take 1 tablet (25 mg total) by mouth daily  -     Comprehensive metabolic panel; Future; Expected date: 08/31/2023  -     SCLERODERMA DIAGNOSTIC PROFILE; Future; Expected date: 08/31/2023  -     Magnesium; Future; Expected date: 08/31/2023  -     magnesium oxide (MAG-OX) 400 mg tablet; Take 1 tablet (400 mg total) by mouth daily    2. Hypomagnesemia  -     magnesium oxide (MAG-OX) 400 mg tablet; Take 1 tablet (400 mg total) by mouth daily    3. Essential hypertension    4. Lower extremity edema    5. SHOEILA positive      1. CKD3b A1  Likely due to age related eGFR loss/Vascular/HTN nephrosclerosis. Cr baseline may be 1.1-1.4 depending on volume status/diuretic. Alb/cr ratio normal at 27 mg/g cr. Renal US reviewed- slightly small echogenic kidneys. Soheila was +, see recommendation below. spep/upep negative. Metabolic parameters stable. Albumin normal.  No s/s/ UTI    Creatinine trends improved since HCTZ reduction. Pt understands her diuretic/BP/Cr will be a balancing act. Plan on decreasing amlodipine to 5mg/day and increasing HCTZ to 25mg/day. Check labs in 2-4 weeks. She will need to call if BP>150/90. Her edema is her most concerning symptom. HCTZ is a weaker diuretic and she will still have some edema,potentially the combination of HCTZ increase and norvasc decrease will help with issue. We may need to increase her lisinopril to help with BP control. Avoid too many changes at once. Follow up in 3 months. 2. Hypomagnesemia, due to diuretics. Mild at present, Mag 1.6. Start magnesium oxide 400mg/day, check magnesium in 1 month given increased HCTZ dosing. 3. Essential HTN   Kwan/renin ratio WNL. plasma metanephrine WNL. BP well controlled at this time.   Will decrease amlodipine to 5mg/day due to lower extremity edema, will increase HCTZ to 25mg/day due to edema that is restricting her activities. She understands BP may initially increase, call if BP>150/90. Follow up chemistry in 1 month for Cr trends. 4. Lower extremity edema, worsened. Due to venous insufficiency+ amlodipine. +CKD. no significant proteinuria to cause. Maintain a low sodium diet. Her edema limits her activities. She understand that her medication/BP control /renal function is a balance and as we titrate back CCB, her BP may increase. As we push diuretics, her Cr may increase. She has agreed to decreasing amlodipine to 5mg/day and increase HCTZ to 25mg/day. 5. +JUAN MIGUEL, + scleroderma antibodies. Titer of 40, un--typeable pattern. Will repeat scleroderma antibodies if still + , will refer to rheumatology. The benefits, risks and alternatives to the treatment plan were discussed at this visit. Patient was advised of common adverse effects of any medical therapies prescribed. All questions were answered and discussed with the patient and any accompanying family members or caretakers. Subjective:      Patient ID: Markie Kennedy is a 78 y.o. female presenting for CKD3 follow up in the [facility] office. Baseline Cr 0.9-1.1. Labs had looked prerenal at t his time. Her antihypertensive were adjusted at last visit, she was continued on same lisinopril dose. She was on HCTZ 12.5mg/day. HPI    In interim since last visit, denies new medical conditions, surgeries, medicine. She reports seasonal allergies and co post nasal gtt. BP   / 50-60s. Denies esophagus issues or Raynauds phenomenon. She had + JUAN MIGUEL that reflexed to antiscleroderma abs. She reports worsening LE edema since last visit. This has been an ongoing issue but she was very bothered by night time urination and felt dehydrated in AM previously.    Now she is bothered by her leg swelling the most.    Her JUAN MIGUEL was + and reflexed to anitscleroderma antibodies. Labs showed improved Cr to 1.25 in June  from 1.49 in April. The following portions of the patient's history were reviewed and updated as appropriate: allergies, current medications, past family history, past medical history, past social history, past surgical history, and problem list.    Review of Systems   Cardiovascular: Positive for leg swelling. Genitourinary: Negative. Neurological: Negative. All other systems reviewed and are negative. Objective:      /60 (BP Location: Right arm, Patient Position: Sitting, Cuff Size: Standard)   Pulse 70   Ht 5' (1.524 m)   Wt 64.9 kg (143 lb)   SpO2 99%   BMI 27.93 kg/m²          Physical Exam  Vitals reviewed. Constitutional:       General: She is not in acute distress. Appearance: She is not ill-appearing. HENT:      Head: Atraumatic. Mouth/Throat:      Mouth: Mucous membranes are moist.      Pharynx: Oropharynx is clear. Eyes:      Extraocular Movements: Extraocular movements intact. Conjunctiva/sclera: Conjunctivae normal.   Cardiovascular:      Rate and Rhythm: Normal rate and regular rhythm. Heart sounds: No friction rub. Pulmonary:      Breath sounds: No wheezing or rales. Abdominal:      Palpations: Abdomen is soft. Tenderness: There is no abdominal tenderness. There is no guarding. Musculoskeletal:      Right lower leg: Edema present. Left lower leg: Edema present. Comments: 2+ LE edema   Skin:     Coloration: Skin is not jaundiced. Findings: No bruising or rash. Neurological:      General: No focal deficit present. Mental Status: She is alert and oriented to person, place, and time. Cranial Nerves: No cranial nerve deficit.    Psychiatric:         Mood and Affect: Mood normal.             Lab Results   Component Value Date     09/16/2015    SODIUM 139 06/14/2023    K 3.7 06/14/2023     06/14/2023    CO2 28 06/14/2023    ANIONGAP 4 09/16/2015    AGAP 9 06/14/2023    BUN 22 06/14/2023    CREATININE 1.25 06/14/2023    GLUC 91 06/14/2023    GLUF 91 04/01/2023    CALCIUM 10.0 06/14/2023    AST 26 06/14/2023    ALT 23 06/14/2023    ALKPHOS 59 06/14/2023    PROT 6.8 09/16/2015    TP 7.1 06/14/2023    TP 7.1 06/14/2023    BILITOT 0.64 09/16/2015    TBILI 0.45 06/14/2023    EGFR 44 06/14/2023      Lab Results   Component Value Date    CREATININE 1.25 06/14/2023    CREATININE 1.49 (H) 04/01/2023    CREATININE 1.11 09/17/2022    CREATININE 0.99 03/19/2022    CREATININE 0.89 12/18/2020    CREATININE 0.73 08/04/2016    CREATININE 0.74 09/16/2015    CREATININE 0.68 03/06/2015    CREATININE 0.68 01/07/2015    CREATININE 0.59 (L) 06/17/2014      Lab Results   Component Value Date    COLORU Yellow 06/14/2023    CLARITYU Clear 06/14/2023    SPECGRAV <=1.005 06/14/2023    PHUR 6.0 06/14/2023    LEUKOCYTESUR Negative 06/14/2023    NITRITE Negative 06/14/2023    PROTEIN UA Negative 06/14/2023    GLUCOSEU Negative 06/14/2023    KETONESU Negative 06/14/2023    UROBILINOGEN 0.2 06/14/2023    BILIRUBINUR Negative 06/14/2023    BLOODU Negative 06/14/2023    RBCUA None Seen 06/14/2023    WBCUA 0-1 (A) 06/14/2023    EPIS None Seen 06/14/2023    BACTERIA None Seen 06/14/2023      No results found for: "LABPROT"  No results found for: "Oscar Marychuy", "[id number]"  Lab Results   Component Value Date    WBC 6.83 04/13/2023    HGB 11.4 (L) 04/13/2023    HCT 33.9 (L) 04/13/2023    MCV 91 04/13/2023     04/13/2023      Lab Results   Component Value Date    HGB 11.4 (L) 04/13/2023    HGB 11.2 (L) 04/01/2023    HGB 13.2 12/18/2020    HGB 13.6 08/04/2016    HGB 14.1 09/16/2015      Lab Results   Component Value Date    IRON 96 04/13/2023    TIBC 285 04/13/2023    FERRITIN 110 04/13/2023      No results found for: "PTHCALCIUM", "[id number]", "PHOSPHORUS"   Lab Results   Component Value Date    CHOLESTEROL 145 04/01/2023    HDL 49 (L) 04/01/2023    LDLCALC 73 04/01/2023 TRIG 117 04/01/2023      Lab Results   Component Value Date    URICACID 6.7 06/14/2023      No results found for: "HGBA1C"   No results found for: "TSHANTIBODY", "[id number]", "Josef Troy"   Lab Results   Component Value Date    JUAN MIGUEL Positive (A) 06/14/2023      Lab Results   Component Value Date    PROT 6.8 09/16/2015    UPEP See Comment 06/14/2023        Portions of the record may have been created with voice recognition software. Occasional wrong word or "sound a like" substitutions may have occurred due to the inherent limitations of voice recognition software. Read the chart carefully and recognize, using context, where substitutions have occurred. If you have any questions, please contact the dictating provider.

## 2023-07-31 NOTE — PATIENT INSTRUCTIONS
We are making 2 changes to blood pressure medicine today, expect that the blood pressure may increase for a time. Call if blood pressure greater than 150/90. Decrease amlodipine to 5mg per day to help with swelling. increase hydrochlorothiazide to 25mg per day. Follow up blood work up in 1 month. Start magnesium oxide 400mg/day. Follow up in 3 months.

## 2023-09-09 ENCOUNTER — APPOINTMENT (OUTPATIENT)
Dept: LAB | Facility: HOSPITAL | Age: 68
End: 2023-09-09
Payer: MEDICARE

## 2023-09-09 DIAGNOSIS — N18.32 STAGE 3B CHRONIC KIDNEY DISEASE (HCC): ICD-10-CM

## 2023-09-09 LAB
ALBUMIN SERPL BCP-MCNC: 4.4 G/DL (ref 3.5–5)
ALP SERPL-CCNC: 52 U/L (ref 34–104)
ALT SERPL W P-5'-P-CCNC: 31 U/L (ref 7–52)
ANION GAP SERPL CALCULATED.3IONS-SCNC: 8 MMOL/L
AST SERPL W P-5'-P-CCNC: 30 U/L (ref 13–39)
BILIRUB SERPL-MCNC: 0.65 MG/DL (ref 0.2–1)
BUN SERPL-MCNC: 30 MG/DL (ref 5–25)
CALCIUM SERPL-MCNC: 9.7 MG/DL (ref 8.4–10.2)
CHLORIDE SERPL-SCNC: 95 MMOL/L (ref 96–108)
CO2 SERPL-SCNC: 28 MMOL/L (ref 21–32)
CREAT SERPL-MCNC: 1.2 MG/DL (ref 0.6–1.3)
GFR SERPL CREATININE-BSD FRML MDRD: 47 ML/MIN/1.73SQ M
GLUCOSE P FAST SERPL-MCNC: 89 MG/DL (ref 65–99)
MAGNESIUM SERPL-MCNC: 1.8 MG/DL (ref 1.9–2.7)
POTASSIUM SERPL-SCNC: 3.8 MMOL/L (ref 3.5–5.3)
PROT SERPL-MCNC: 6.8 G/DL (ref 6.4–8.4)
SODIUM SERPL-SCNC: 131 MMOL/L (ref 135–147)

## 2023-09-09 PROCEDURE — 80053 COMPREHEN METABOLIC PANEL: CPT

## 2023-09-09 PROCEDURE — 36415 COLL VENOUS BLD VENIPUNCTURE: CPT

## 2023-09-09 PROCEDURE — 86235 NUCLEAR ANTIGEN ANTIBODY: CPT

## 2023-09-09 PROCEDURE — 83735 ASSAY OF MAGNESIUM: CPT

## 2023-09-11 LAB
Lab: NORMAL
MISCELLANEOUS LAB TEST RESULT: NORMAL
STONE ANALYSIS-IMP: NORMAL

## 2023-09-12 ENCOUNTER — TELEPHONE (OUTPATIENT)
Dept: NEPHROLOGY | Facility: CLINIC | Age: 68
End: 2023-09-12

## 2023-09-12 DIAGNOSIS — N18.32 STAGE 3B CHRONIC KIDNEY DISEASE (HCC): Primary | ICD-10-CM

## 2023-09-25 ENCOUNTER — APPOINTMENT (OUTPATIENT)
Dept: LAB | Facility: HOSPITAL | Age: 68
End: 2023-09-25
Payer: MEDICARE

## 2023-09-25 DIAGNOSIS — N18.32 STAGE 3B CHRONIC KIDNEY DISEASE (HCC): ICD-10-CM

## 2023-09-25 LAB
ANION GAP SERPL CALCULATED.3IONS-SCNC: 9 MMOL/L
BUN SERPL-MCNC: 26 MG/DL (ref 5–25)
CALCIUM SERPL-MCNC: 10.6 MG/DL (ref 8.4–10.2)
CHLORIDE SERPL-SCNC: 103 MMOL/L (ref 96–108)
CO2 SERPL-SCNC: 32 MMOL/L (ref 21–32)
CREAT SERPL-MCNC: 1.4 MG/DL (ref 0.6–1.3)
GFR SERPL CREATININE-BSD FRML MDRD: 39 ML/MIN/1.73SQ M
GLUCOSE P FAST SERPL-MCNC: 94 MG/DL (ref 65–99)
POTASSIUM SERPL-SCNC: 3.8 MMOL/L (ref 3.5–5.3)
SODIUM SERPL-SCNC: 144 MMOL/L (ref 135–147)

## 2023-09-25 PROCEDURE — 80048 BASIC METABOLIC PNL TOTAL CA: CPT

## 2023-09-25 PROCEDURE — 36415 COLL VENOUS BLD VENIPUNCTURE: CPT

## 2023-09-28 ENCOUNTER — TELEPHONE (OUTPATIENT)
Dept: NEPHROLOGY | Facility: CLINIC | Age: 68
End: 2023-09-28

## 2023-09-28 DIAGNOSIS — N18.32 STAGE 3B CHRONIC KIDNEY DISEASE (HCC): Primary | ICD-10-CM

## 2023-10-18 ENCOUNTER — APPOINTMENT (OUTPATIENT)
Dept: LAB | Facility: HOSPITAL | Age: 68
End: 2023-10-18
Payer: MEDICARE

## 2023-10-18 DIAGNOSIS — N18.32 STAGE 3B CHRONIC KIDNEY DISEASE (HCC): ICD-10-CM

## 2023-10-18 LAB
ANION GAP SERPL CALCULATED.3IONS-SCNC: 9 MMOL/L
BUN SERPL-MCNC: 22 MG/DL (ref 5–25)
CALCIUM SERPL-MCNC: 9.7 MG/DL (ref 8.4–10.2)
CHLORIDE SERPL-SCNC: 102 MMOL/L (ref 96–108)
CO2 SERPL-SCNC: 30 MMOL/L (ref 21–32)
CREAT SERPL-MCNC: 1.28 MG/DL (ref 0.6–1.3)
GFR SERPL CREATININE-BSD FRML MDRD: 43 ML/MIN/1.73SQ M
GLUCOSE P FAST SERPL-MCNC: 94 MG/DL (ref 65–99)
POTASSIUM SERPL-SCNC: 3.4 MMOL/L (ref 3.5–5.3)
SODIUM SERPL-SCNC: 141 MMOL/L (ref 135–147)

## 2023-10-18 PROCEDURE — 36415 COLL VENOUS BLD VENIPUNCTURE: CPT

## 2023-10-18 PROCEDURE — 80048 BASIC METABOLIC PNL TOTAL CA: CPT

## 2023-10-25 ENCOUNTER — OFFICE VISIT (OUTPATIENT)
Dept: NEPHROLOGY | Facility: CLINIC | Age: 68
End: 2023-10-25
Payer: MEDICARE

## 2023-10-25 VITALS
HEIGHT: 60 IN | WEIGHT: 140 LBS | BODY MASS INDEX: 27.48 KG/M2 | SYSTOLIC BLOOD PRESSURE: 138 MMHG | DIASTOLIC BLOOD PRESSURE: 72 MMHG

## 2023-10-25 DIAGNOSIS — N18.32 STAGE 3B CHRONIC KIDNEY DISEASE (HCC): ICD-10-CM

## 2023-10-25 DIAGNOSIS — R60.0 LOWER EXTREMITY EDEMA: ICD-10-CM

## 2023-10-25 DIAGNOSIS — E87.6 HYPOKALEMIA: ICD-10-CM

## 2023-10-25 DIAGNOSIS — I10 HYPERTENSION, UNSPECIFIED TYPE: Primary | ICD-10-CM

## 2023-10-25 PROCEDURE — 99214 OFFICE O/P EST MOD 30 MIN: CPT | Performed by: INTERNAL MEDICINE

## 2023-10-25 RX ORDER — LOSARTAN POTASSIUM 50 MG/1
50 TABLET ORAL DAILY
Qty: 30 TABLET | Refills: 2 | Status: SHIPPED | OUTPATIENT
Start: 2023-10-25

## 2023-10-25 NOTE — PATIENT INSTRUCTIONS
Stop lisinopril as this may be causing a cough, change to losartan 50mg/day. Call with any issue. Your potassium is mildly low, we discussing increasing potassium intake as opposed to potassium pills. Continue to monitor your blood pressure, if blood pressure greater than 140 top number consistently, may need to increase losartan to maximal dose. Call the office if this happens. Your kidney function is stable at 43%. This is baseline. Continue low sodium diet. Follow up in 6 months.

## 2023-10-25 NOTE — PROGRESS NOTES
Assessment & Plan:    1. Hypertension, unspecified type  -     losartan (COZAAR) 50 mg tablet; Take 1 tablet (50 mg total) by mouth daily  -     Urinalysis with microscopic; Future; Expected date: 04/17/2024  -     Uric acid; Future; Expected date: 04/17/2024  -     PTH, intact; Future; Expected date: 04/17/2024  -     Comprehensive metabolic panel; Future; Expected date: 04/17/2024  -     Albumin / creatinine urine ratio; Future; Expected date: 04/17/2024  -     Magnesium; Future; Expected date: 04/17/2024  -     Phosphorus; Future; Expected date: 04/17/2024    2. Stage 3b chronic kidney disease ([address])  -     Urinalysis with microscopic; Future; Expected date: 04/17/2024  -     Uric acid; Future; Expected date: 04/17/2024  -     PTH, intact; Future; Expected date: 04/17/2024  -     Comprehensive metabolic panel; Future; Expected date: 04/17/2024  -     Albumin / creatinine urine ratio; Future; Expected date: 04/17/2024  -     Magnesium; Future; Expected date: 04/17/2024  -     Phosphorus; Future; Expected date: 04/17/2024    3. Lower extremity edema    4. Hypokalemia         Primary HTN  BP appears well controlled in the office. Volume status appears compensated. Stop lisinopril as this may be causing a cough, change to losartan 50mg/day. Reviewed side effect profile. Your potassium is mildly low, we discussing increasing potassium intake as opposed to potassium pills. Continue to monitor your blood pressure, if blood pressure greater than 140 top number consistently, may need to increase losartan to maximal dose. Call the office if this happens. 2. DVS9C A1   Etiology 2/2 age related eGFR loss/Vascular/HTN nephrosclerosis. Baseline suspected to be 1.1-1.4mg/dl. Labs 10/18 show Cr 1.28 with eGFR 43 ml/min. Metabolic show mildly low potassium at 3.4. calcium trends normalized. Renal US-- slightly small echogenic kidneys. Reviewed CKD stages,cr and eGFR trends.   Continue to monitor proteinuria,bone mineral and metabolic parameters. Change lisinopril to losartan with potential cough due to ACE. Reviewed side effect profile. If any issues with medication or increasing BP trends, she will contact the office. Follow up in 6 months or sooner if needed. 3. Lower extremity edema  Appears reasonable at present. Worsened at end of the day to venous insufficiency. Advise 2 gram Na diet. Agree with compression stockings. Continue current HCTZ dose. 4. Mild, asymptomatic hypokalemia due to HCTZ.  K 3.4. advised high potassium intake and reviewed high potassium foods. The benefits, risks and alternatives to the treatment plan were discussed at this visit. Patient was advised of common adverse effects of any medical therapies prescribed. All questions were answered and discussed with the patient and any accompanying family members or caretakers. Subjective:      Patient ID: Mohan Mora is a 78 y.o. female presenting for follow up in the [facility] office for [facility]. Last seen on 7/31/23. Amlodipine decreased at last visit due to edema and HCTZ increased. HPI    In the interim since last visit, she has continued with a dry, nonproductive cough which she had associated with post nasal gtt. Denies other associated symptoms and denies relief with supportive measures so far. She noted increased cough since lisinopril dose increased. Claritin did not improve symptoms. Her edema is stable and slightly improved. She is attempting to maintain on low sodium diet. Her edema is most notable at the end of the day. She does not use compression stocking during the summer but has been advised to use. She had + scleroderma testing previously but repeat testing negative. Denies chest pain/sob/palpitations. Currently maintained on lisinopril 30 mg/day, HCTZ 25mg/day and amlodipine 5mg/day. Her medication have been adjusted to help with edema and renal function.     BP trends well controlled at home and in office. Review of Systems - Negative except cough. Physical Exam  Vitals reviewed. Constitutional:       General: She is not in acute distress. HENT:      Head: Atraumatic. Nose: Nose normal.      Mouth/Throat:      Mouth: Mucous membranes are moist.      Pharynx: Oropharynx is clear. Eyes:      Extraocular Movements: Extraocular movements intact. Conjunctiva/sclera: Conjunctivae normal.   Cardiovascular:      Rate and Rhythm: Normal rate and regular rhythm. Heart sounds: No friction rub. Pulmonary:      Breath sounds: No wheezing, rhonchi or rales. Abdominal:      General: There is no distension. Palpations: Abdomen is soft. Tenderness: There is no abdominal tenderness. There is no guarding. Musculoskeletal:      Right lower leg: No edema. Left lower leg: No edema. Skin:     General: Skin is warm and dry. Coloration: Skin is not jaundiced. Findings: No bruising, lesion or rash. Neurological:      General: No focal deficit present. Mental Status: She is alert and oriented to person, place, and time. Mental status is at baseline. Cranial Nerves: No cranial nerve deficit.    Psychiatric:         Mood and Affect: Mood normal.         Behavior: Behavior normal.               Lab Results   Component Value Date    CREATININE 1.28 10/18/2023    CREATININE 1.40 (H) 09/25/2023    CREATININE 1.20 09/09/2023    CREATININE 1.25 06/14/2023    CREATININE 1.49 (H) 04/01/2023    CREATININE 1.11 09/17/2022    CREATININE 0.99 03/19/2022    CREATININE 0.89 12/18/2020    CREATININE 0.73 08/04/2016    CREATININE 0.74 09/16/2015    CREATININE 0.68 03/06/2015    CREATININE 0.68 01/07/2015    CREATININE 0.59 (L) 06/17/2014      Lab Results   Component Value Date    COLORU Yellow 06/14/2023    CLARITYU Clear 06/14/2023    SPECGRAV <=1.005 06/14/2023    PHUR 6.0 06/14/2023    LEUKOCYTESUR Negative 06/14/2023    NITRITE Negative 06/14/2023    PROTEIN UA Negative 06/14/2023    GLUCOSEU Negative 06/14/2023    KETONESU Negative 06/14/2023    UROBILINOGEN 0.2 06/14/2023    BILIRUBINUR Negative 06/14/2023    BLOODU Negative 06/14/2023    RBCUA None Seen 06/14/2023    WBCUA 0-1 (A) 06/14/2023    EPIS None Seen 06/14/2023    BACTERIA None Seen 06/14/2023      No results found for: "LABPROT"  No results found for: "Littie Mews", "[id number]"  Lab Results   Component Value Date    WBC 6.83 04/13/2023    HGB 11.4 (L) 04/13/2023    HCT 33.9 (L) 04/13/2023    MCV 91 04/13/2023     04/13/2023      Lab Results   Component Value Date    HGB 11.4 (L) 04/13/2023    HGB 11.2 (L) 04/01/2023    HGB 13.2 12/18/2020    HGB 13.6 08/04/2016    HGB 14.1 09/16/2015      Lab Results   Component Value Date    IRON 96 04/13/2023    TIBC 285 04/13/2023    FERRITIN 110 04/13/2023      No results found for: "PTHCALCIUM", "[id number]", "PHOSPHORUS"   Lab Results   Component Value Date    CHOLESTEROL 145 04/01/2023    HDL 49 (L) 04/01/2023    LDLCALC 73 04/01/2023    TRIG 117 04/01/2023      Lab Results   Component Value Date    URICACID 6.7 06/14/2023      No results found for: "HGBA1C"   No results found for: "TSHANTIBODY", "[id number]", "FREET4"   Lab Results   Component Value Date    JUAN MIGUEL Positive (A) 06/14/2023      Lab Results   Component Value Date    PROT 6.8 09/16/2015    UPEP See Comment 06/14/2023        Portions of the record may have been created with voice recognition software. Occasional wrong word or "sound a like" substitutions may have occurred due to the inherent limitations of voice recognition software. Read the chart carefully and recognize, using context, where substitutions have occurred. If you have any questions, please contact the dictating providers.

## 2023-11-16 DIAGNOSIS — I10 HYPERTENSION, UNSPECIFIED TYPE: ICD-10-CM

## 2023-11-16 RX ORDER — LOSARTAN POTASSIUM 50 MG/1
50 TABLET ORAL DAILY
Qty: 90 TABLET | Refills: 1 | Status: SHIPPED | OUTPATIENT
Start: 2023-11-16

## 2023-12-12 ENCOUNTER — HOSPITAL ENCOUNTER (OUTPATIENT)
Dept: RADIOLOGY | Facility: HOSPITAL | Age: 68
Discharge: HOME | End: 2023-12-12
Payer: MEDICARE

## 2023-12-12 ENCOUNTER — OFFICE VISIT (OUTPATIENT)
Dept: FAMILY MEDICINE CLINIC | Facility: CLINIC | Age: 68
End: 2023-12-12
Payer: MEDICARE

## 2023-12-12 VITALS
SYSTOLIC BLOOD PRESSURE: 149 MMHG | HEART RATE: 68 BPM | WEIGHT: 145 LBS | HEIGHT: 60 IN | DIASTOLIC BLOOD PRESSURE: 72 MMHG | OXYGEN SATURATION: 99 % | TEMPERATURE: 97 F | BODY MASS INDEX: 28.47 KG/M2

## 2023-12-12 DIAGNOSIS — J31.0 CHRONIC RHINITIS: Primary | ICD-10-CM

## 2023-12-12 DIAGNOSIS — R49.0 HOARSENESS: ICD-10-CM

## 2023-12-12 DIAGNOSIS — J31.0 CHRONIC RHINITIS: ICD-10-CM

## 2023-12-12 PROCEDURE — 99213 OFFICE O/P EST LOW 20 MIN: CPT | Performed by: FAMILY MEDICINE

## 2023-12-12 PROCEDURE — 70210 X-RAY EXAM OF SINUSES: CPT

## 2023-12-12 RX ORDER — IPRATROPIUM BROMIDE 21 UG/1
2 SPRAY, METERED NASAL 3 TIMES DAILY
Qty: 30 ML | Refills: 1 | Status: SHIPPED | OUTPATIENT
Start: 2023-12-12

## 2023-12-12 NOTE — PROGRESS NOTES
Name: Usama Sanchez      : 1955      MRN: [id number]  Encounter Provider: Kishan Watson DO  Encounter Date: 2023   Encounter department: [address] [place] [address]     1. Chronic rhinitis  Assessment & Plan:  I did not notice a change in the patient's voice. I did notice that the patient was clearing her throat on occasion during her office visit. I am going to the patient for the postnasal drip. If this does not improve her symptoms with the voice, then she is going to need to follow-up with ENT. Patient was started on ipratropium bromide nasal spray 0.3%. She will use 2 sprays to each nostril 3 times per day. I told the patient that if she finds this is too drying she can gradually decrease the dose. However, she should 3 times per day to start. I did review the patient's EGD as she told me her symptoms seem to be worse after eating. She had an EGD in 2021. I did tell the patient that acid reflux could also cause similar symptoms but I think that is further down on the differential diagnosis. She may also have presbylaryngitis as the cause for her voice change. This will cause hoarseness when individuals talks a lot    Orders:  -     ipratropium (ATROVENT) 0.03 % nasal spray; 2 sprays into each nostril 3 (three) times a day  -     XR sinuses < 3 vw; Future; Expected date: 2023    2. Hoarseness           Subjective      Patient is a 27-year-old white male who presents to the office today for complaints of voice changes. She tells me that the more she talks the more baritone her voice becomes. She thinks it is due to a postnasal drip. She complains of a postnasal drip. Symptoms are worse after eating. She tells me she constantly has to clear her throat. She tried Mucinex which helped to a degree. She tried Claritin which did not help at all.   Tells me she saw her nephrologist recently changed her lisinopril to losartan but that did not make any change with her symptoms. She tells me she asked her  about this. Her  did not notice any change with her voice. Review of Systems   Constitutional:  Negative for chills and fever. HENT:  Positive for postnasal drip and voice change. Negative for congestion and rhinorrhea. Gastrointestinal:  Negative for abdominal distention, abdominal pain, blood in stool, constipation and diarrhea. Denies heartburn and indigestion       Current Outpatient Medications on File Prior to Visit   Medication Sig    amLODIPine (NORVASC) 5 mg tablet Take 1 tablet (5 mg total) by mouth daily    ascorbic acid (VITAMIN C) 250 MG tablet Take 500 mg by mouth 2 (two) times a day    Calcium Citrate-Vitamin D3 315-250 MG-UNIT TABS Take 1 tablet by mouth daily    hydrochlorothiazide (HYDRODIURIL) 25 mg tablet Take 1 tablet (25 mg total) by mouth daily    LORazepam (ATIVAN) 0.5 mg tablet Take 1 tablet (0.5 mg total) by mouth every 8 (eight) hours as needed for anxiety    losartan (COZAAR) 50 mg tablet TAKE 1 TABLET BY MOUTH EVERY DAY    magnesium oxide (MAG-OX) 400 mg tablet Take 1 tablet (400 mg total) by mouth daily    metoprolol succinate (TOPROL-XL) 50 mg 24 hr tablet TAKE 1 TABLET BY MOUTH EVERY DAY    Multiple Vitamins-Minerals (CENTRUM SILVER 50+WOMEN) TABS Take by mouth    psyllium (METAMUCIL) 58.6 % packet Take 1 packet by mouth 2 (two) times a day 2 TSP DAILY     rosuvastatin (CRESTOR) 10 MG tablet Take 1 tablet (10 mg total) by mouth daily       Objective     /72   Pulse 68   Temp (!) 97 °F (36.1 °C) (Tympanic)   Ht 5' (1.524 m)   Wt 65.8 kg (145 lb)   SpO2 99%   BMI 28.32 kg/m²     Physical Exam  Vitals reviewed. Constitutional:       Comments: Patient is a 18-year-old white female who appears her stated age. The patient is pleasant, cooperative, and in no distress   HENT:      Head: Normocephalic and atraumatic. Comments:  There is no tenderness to palpation over the paranasal sinuses     Right Ear: Tympanic membrane, ear canal and external ear normal.      Left Ear: Tympanic membrane, ear canal and external ear normal.      Nose: No congestion or rhinorrhea. Mouth/Throat:      Mouth: Mucous membranes are moist.      Pharynx: Oropharynx is clear. No oropharyngeal exudate or posterior oropharyngeal erythema. Eyes:      General: No scleral icterus. Right eye: No discharge. Left eye: No discharge. Conjunctiva/sclera: Conjunctivae normal.      Pupils: Pupils are equal, round, and reactive to light. Cardiovascular:      Rate and Rhythm: Normal rate and regular rhythm. Heart sounds: Normal heart sounds. No murmur heard. No friction rub. No gallop. Pulmonary:      Effort: Pulmonary effort is normal. No respiratory distress. Breath sounds: Normal breath sounds. No stridor. No wheezing, rhonchi or rales. Abdominal:      General: Bowel sounds are normal. There is no distension. Palpations: Abdomen is soft. There is no mass. Tenderness: There is no abdominal tenderness. There is no guarding. Musculoskeletal:      Cervical back: Neck supple. Lymphadenopathy:      Cervical: No cervical adenopathy.        Yulia Pickard DO

## 2023-12-13 NOTE — ASSESSMENT & PLAN NOTE
I did not notice a change in the patient's voice. I did notice that the patient was clearing her throat on occasion during her office visit. I am going to the patient for the postnasal drip. If this does not improve her symptoms with the voice, then she is going to need to follow-up with ENT. Patient was started on ipratropium bromide nasal spray 0.3%. She will use 2 sprays to each nostril 3 times per day. I told the patient that if she finds this is too drying she can gradually decrease the dose. However, she should 3 times per day to start. I did review the patient's EGD as she told me her symptoms seem to be worse after eating. She had an EGD in February 2021. I did tell the patient that acid reflux could also cause similar symptoms but I think that is further down on the differential diagnosis. She may also have presbylaryngitis as the cause for her voice change.   This will cause hoarseness when individuals talks a lot

## 2024-01-03 DIAGNOSIS — J31.0 CHRONIC RHINITIS: ICD-10-CM

## 2024-01-03 RX ORDER — IPRATROPIUM BROMIDE 21 UG/1
SPRAY, METERED NASAL
Qty: 30 ML | Refills: 2 | Status: SHIPPED | OUTPATIENT
Start: 2024-01-03

## 2024-01-04 ENCOUNTER — APPOINTMENT (OUTPATIENT)
Dept: LAB | Facility: HOSPITAL | Age: 69
End: 2024-01-04
Payer: MEDICARE

## 2024-01-04 DIAGNOSIS — Z79.899 ENCOUNTER FOR LONG-TERM (CURRENT) USE OF MEDICATIONS: ICD-10-CM

## 2024-01-04 DIAGNOSIS — E78.2 MIXED HYPERLIPIDEMIA: ICD-10-CM

## 2024-01-04 LAB
ALBUMIN SERPL BCP-MCNC: 4.4 G/DL (ref 3.5–5)
ALP SERPL-CCNC: 72 U/L (ref 34–104)
ALT SERPL W P-5'-P-CCNC: 29 U/L (ref 7–52)
ANION GAP SERPL CALCULATED.3IONS-SCNC: 8 MMOL/L
AST SERPL W P-5'-P-CCNC: 31 U/L (ref 13–39)
BASOPHILS # BLD AUTO: 0.04 THOUSANDS/ÂΜL (ref 0–0.1)
BASOPHILS NFR BLD AUTO: 1 % (ref 0–1)
BILIRUB SERPL-MCNC: 0.82 MG/DL (ref 0.2–1)
BUN SERPL-MCNC: 23 MG/DL (ref 5–25)
CALCIUM SERPL-MCNC: 9.8 MG/DL (ref 8.4–10.2)
CHLORIDE SERPL-SCNC: 101 MMOL/L (ref 96–108)
CO2 SERPL-SCNC: 30 MMOL/L (ref 21–32)
CREAT SERPL-MCNC: 1.27 MG/DL (ref 0.6–1.3)
EOSINOPHIL # BLD AUTO: 0.16 THOUSAND/ÂΜL (ref 0–0.61)
EOSINOPHIL NFR BLD AUTO: 2 % (ref 0–6)
ERYTHROCYTE [DISTWIDTH] IN BLOOD BY AUTOMATED COUNT: 11.9 % (ref 11.6–15.1)
GFR SERPL CREATININE-BSD FRML MDRD: 43 ML/MIN/1.73SQ M
GLUCOSE P FAST SERPL-MCNC: 95 MG/DL (ref 65–99)
HCT VFR BLD AUTO: 39.1 % (ref 34.8–46.1)
HGB BLD-MCNC: 12.8 G/DL (ref 11.5–15.4)
IMM GRANULOCYTES # BLD AUTO: 0.02 THOUSAND/UL (ref 0–0.2)
IMM GRANULOCYTES NFR BLD AUTO: 0 % (ref 0–2)
LDLC SERPL DIRECT ASSAY-MCNC: 77 MG/DL (ref 0–100)
LYMPHOCYTES # BLD AUTO: 1.61 THOUSANDS/ÂΜL (ref 0.6–4.47)
LYMPHOCYTES NFR BLD AUTO: 23 % (ref 14–44)
MCH RBC QN AUTO: 30.6 PG (ref 26.8–34.3)
MCHC RBC AUTO-ENTMCNC: 32.7 G/DL (ref 31.4–37.4)
MCV RBC AUTO: 94 FL (ref 82–98)
MONOCYTES # BLD AUTO: 0.67 THOUSAND/ÂΜL (ref 0.17–1.22)
MONOCYTES NFR BLD AUTO: 10 % (ref 4–12)
NEUTROPHILS # BLD AUTO: 4.49 THOUSANDS/ÂΜL (ref 1.85–7.62)
NEUTS SEG NFR BLD AUTO: 64 % (ref 43–75)
NRBC BLD AUTO-RTO: 0 /100 WBCS
PLATELET # BLD AUTO: 255 THOUSANDS/UL (ref 149–390)
PMV BLD AUTO: 9.2 FL (ref 8.9–12.7)
POTASSIUM SERPL-SCNC: 3.8 MMOL/L (ref 3.5–5.3)
PROT SERPL-MCNC: 7 G/DL (ref 6.4–8.4)
RBC # BLD AUTO: 4.18 MILLION/UL (ref 3.81–5.12)
SODIUM SERPL-SCNC: 139 MMOL/L (ref 135–147)
WBC # BLD AUTO: 6.99 THOUSAND/UL (ref 4.31–10.16)

## 2024-01-04 PROCEDURE — 80053 COMPREHEN METABOLIC PANEL: CPT

## 2024-01-04 PROCEDURE — 85025 COMPLETE CBC W/AUTO DIFF WBC: CPT

## 2024-01-04 PROCEDURE — 83721 ASSAY OF BLOOD LIPOPROTEIN: CPT

## 2024-01-04 PROCEDURE — 36415 COLL VENOUS BLD VENIPUNCTURE: CPT

## 2024-01-05 DIAGNOSIS — I10 ESSENTIAL HYPERTENSION, BENIGN: ICD-10-CM

## 2024-01-05 RX ORDER — METOPROLOL SUCCINATE 50 MG/1
TABLET, EXTENDED RELEASE ORAL
Qty: 90 TABLET | Refills: 3 | Status: SHIPPED | OUTPATIENT
Start: 2024-01-05

## 2024-01-08 ENCOUNTER — OFFICE VISIT (OUTPATIENT)
Dept: FAMILY MEDICINE CLINIC | Facility: CLINIC | Age: 69
End: 2024-01-08
Payer: MEDICARE

## 2024-01-08 VITALS
SYSTOLIC BLOOD PRESSURE: 138 MMHG | OXYGEN SATURATION: 98 % | HEART RATE: 74 BPM | TEMPERATURE: 97.3 F | BODY MASS INDEX: 28.62 KG/M2 | DIASTOLIC BLOOD PRESSURE: 70 MMHG | HEIGHT: 60 IN | WEIGHT: 145.8 LBS

## 2024-01-08 DIAGNOSIS — H90.3 SENSORINEURAL HEARING LOSS (SNHL) OF BOTH EARS: ICD-10-CM

## 2024-01-08 DIAGNOSIS — I10 ESSENTIAL HYPERTENSION, BENIGN: Primary | ICD-10-CM

## 2024-01-08 DIAGNOSIS — N18.32 STAGE 3B CHRONIC KIDNEY DISEASE (HCC): ICD-10-CM

## 2024-01-08 DIAGNOSIS — E78.2 MIXED HYPERLIPIDEMIA: ICD-10-CM

## 2024-01-08 PROCEDURE — 99214 OFFICE O/P EST MOD 30 MIN: CPT | Performed by: FAMILY MEDICINE

## 2024-01-08 NOTE — PROGRESS NOTES
Name: Reshma Gunn      : 1955      MRN: [id number]  Encounter Provider: Yamil Herbert DO  Encounter Date: 2024   Encounter department: [facility]    Assessment & Plan     1. Essential hypertension, benign  Assessment & Plan:  I rechecked the patient's blood pressure today myself and found her blood pressure to be 138/70.  I recommended no change with her current medication.  Patient will continue Toprol-XL 50 mg daily, losartan 50 mg daily, amlodipine 5 mg daily, and hydrochlorothiazide 25 mg daily.      2. Sensorineural hearing loss (SNHL) of both ears  Assessment & Plan:  Placed a referral for the patient to see audiology for a complete audiogram.    Orders:  -     Ambulatory Referral to Audiology; Future    3. Stage 3b chronic kidney disease (HCC)  Assessment & Plan:  Lab Results   Component Value Date    EGFR 43 2024    EGFR 43 10/18/2023    EGFR 39 2023    CREATININE 1.27 2024    CREATININE 1.28 10/18/2023    CREATININE 1.40 (H) 2023   Patient's creatinine was noted to be normal at 1.27.  Her estimated GFR is stable at 43.  Avoid nephrotoxic agents.  Patient was instructed to continue follow-up with her nephrologist.      4. Mixed hyperlipidemia  Assessment & Plan:  I reviewed the patient's labs.  Her direct LDL cholesterol is 77.  Her goal is less than 100.  Patient will continue rosuvastatin 10 mg daily    Orders:  -     Lipid panel; Future  -     Comprehensive metabolic panel; Future           Subjective      Patient is a 68-year-old white female who presents to the office today for her routine checkup.  The patient is doing well and has no complaints.  She tells me she checks her blood pressures 1-2 times per week.  She tells me that her blood pressures have been running very good.  She did not have a written log book with her.  She tells me this morning, her blood pressure was 117/72.  Patient is exercising regularly.  She tells me she does  walking and aerobics.  Patient did have blood work done in anticipation of her visit today.      Review of Systems   HENT:  Positive for hearing loss.    Cardiovascular:  Negative for chest pain, palpitations and leg swelling.   Gastrointestinal:  Negative for abdominal distention, abdominal pain, blood in stool, constipation, diarrhea and nausea.   Genitourinary:  Negative for dysuria, frequency and urgency.       Current Outpatient Medications on File Prior to Visit   Medication Sig    amLODIPine (NORVASC) 5 mg tablet Take 1 tablet (5 mg total) by mouth daily    ascorbic acid (VITAMIN C) 250 MG tablet Take 500 mg by mouth 2 (two) times a day    Calcium Citrate-Vitamin D3 315-250 MG-UNIT TABS Take 1 tablet by mouth daily    hydrochlorothiazide (HYDRODIURIL) 25 mg tablet Take 1 tablet (25 mg total) by mouth daily    ipratropium (ATROVENT) 0.03 % nasal spray SPRAY 2 SPRAYS INTO EACH NOSTRIL 3 TIMES A DAY. (Patient taking differently: 2 times daily)    LORazepam (ATIVAN) 0.5 mg tablet Take 1 tablet (0.5 mg total) by mouth every 8 (eight) hours as needed for anxiety    losartan (COZAAR) 50 mg tablet TAKE 1 TABLET BY MOUTH EVERY DAY    magnesium oxide (MAG-OX) 400 mg tablet Take 1 tablet (400 mg total) by mouth daily    metoprolol succinate (TOPROL-XL) 50 mg 24 hr tablet TAKE 1 TABLET BY MOUTH EVERY DAY    Multiple Vitamins-Minerals (CENTRUM SILVER 50+WOMEN) TABS Take by mouth    psyllium (METAMUCIL) 58.6 % packet Take 1 packet by mouth 2 (two) times a day 2 TSP DAILY     rosuvastatin (CRESTOR) 10 MG tablet Take 1 tablet (10 mg total) by mouth daily       Objective     /70 (BP Location: Left arm, Patient Position: Sitting, Cuff Size: Standard)   Pulse 74   Temp (!) 97.3 °F (36.3 °C) (Tympanic)   Ht 5' (1.524 m)   Wt 66.1 kg (145 lb 12.8 oz)   SpO2 98%   BMI 28.47 kg/m²     Physical Exam  Vitals reviewed.   Constitutional:       Comments: This is a 68-year-old white female who appears her stated age.  The  patient is nonseptic in appearance and in no apparent distress   HENT:      Head: Normocephalic and atraumatic.      Right Ear: Tympanic membrane, ear canal and external ear normal.      Left Ear: Tympanic membrane, ear canal and external ear normal.      Mouth/Throat:      Mouth: Mucous membranes are moist.      Pharynx: Oropharynx is clear. No oropharyngeal exudate or posterior oropharyngeal erythema.   Eyes:      General: No scleral icterus.        Right eye: No discharge.         Left eye: No discharge.      Conjunctiva/sclera: Conjunctivae normal.      Pupils: Pupils are equal, round, and reactive to light.   Neck:      Vascular: No carotid bruit.      Comments: No thyromegaly is appreciated  Cardiovascular:      Rate and Rhythm: Normal rate and regular rhythm.      Heart sounds: Normal heart sounds. No murmur heard.     No friction rub. No gallop.   Pulmonary:      Effort: Pulmonary effort is normal. No respiratory distress.      Breath sounds: Normal breath sounds. No stridor. No wheezing, rhonchi or rales.   Abdominal:      General: Bowel sounds are normal. There is no distension.      Palpations: Abdomen is soft. There is no mass.      Tenderness: There is no abdominal tenderness. There is no guarding.      Comments: There is no hepatosplenomegaly   Musculoskeletal:      Cervical back: Neck supple.   Lymphadenopathy:      Cervical: No cervical adenopathy.   Psychiatric:         Mood and Affect: Mood normal.         Behavior: Behavior normal.         Thought Content: Thought content normal.         Judgment: Judgment normal.     Extremities: Without cyanosis, clubbing, or edema    Yamil Herbert DO

## 2024-01-09 NOTE — ASSESSMENT & PLAN NOTE
I reviewed the patient's labs.  Her direct LDL cholesterol is 77.  Her goal is less than 100.  Patient will continue rosuvastatin 10 mg daily

## 2024-01-09 NOTE — ASSESSMENT & PLAN NOTE
Lab Results   Component Value Date    EGFR 43 01/04/2024    EGFR 43 10/18/2023    EGFR 39 09/25/2023    CREATININE 1.27 01/04/2024    CREATININE 1.28 10/18/2023    CREATININE 1.40 (H) 09/25/2023   Patient's creatinine was noted to be normal at 1.27.  Her estimated GFR is stable at 43.  Avoid nephrotoxic agents.  Patient was instructed to continue follow-up with her nephrologist.

## 2024-01-09 NOTE — ASSESSMENT & PLAN NOTE
I rechecked the patient's blood pressure today myself and found her blood pressure to be 138/70.  I recommended no change with her current medication.  Patient will continue Toprol-XL 50 mg daily, losartan 50 mg daily, amlodipine 5 mg daily, and hydrochlorothiazide 25 mg daily.

## 2024-01-19 DIAGNOSIS — N18.32 STAGE 3B CHRONIC KIDNEY DISEASE (HCC): ICD-10-CM

## 2024-01-19 RX ORDER — AMLODIPINE BESYLATE 5 MG/1
5 TABLET ORAL DAILY
Qty: 90 TABLET | Refills: 1 | Status: SHIPPED | OUTPATIENT
Start: 2024-01-19

## 2024-01-22 DIAGNOSIS — N18.32 STAGE 3B CHRONIC KIDNEY DISEASE (HCC): ICD-10-CM

## 2024-01-22 DIAGNOSIS — E83.42 HYPOMAGNESEMIA: ICD-10-CM

## 2024-01-22 RX ORDER — MAGNESIUM OXIDE 400 MG/1
400 TABLET ORAL DAILY
Qty: 90 TABLET | Refills: 2 | Status: SHIPPED | OUTPATIENT
Start: 2024-01-22

## 2024-01-22 RX ORDER — HYDROCHLOROTHIAZIDE 25 MG/1
25 TABLET ORAL DAILY
Qty: 90 TABLET | Refills: 1 | Status: SHIPPED | OUTPATIENT
Start: 2024-01-22

## 2024-01-24 DIAGNOSIS — J31.0 CHRONIC RHINITIS: ICD-10-CM

## 2024-01-24 RX ORDER — IPRATROPIUM BROMIDE 21 UG/1
SPRAY, METERED NASAL
Qty: 30 ML | Refills: 5 | Status: SHIPPED | OUTPATIENT
Start: 2024-01-24

## 2024-02-13 DIAGNOSIS — J31.0 CHRONIC RHINITIS: ICD-10-CM

## 2024-02-14 RX ORDER — IPRATROPIUM BROMIDE 21 UG/1
SPRAY, METERED NASAL
Qty: 90 ML | Refills: 2 | Status: SHIPPED | OUTPATIENT
Start: 2024-02-14

## 2024-02-20 ENCOUNTER — OFFICE VISIT (OUTPATIENT)
Dept: AUDIOLOGY | Facility: CLINIC | Age: 69
End: 2024-02-20
Payer: MEDICARE

## 2024-02-20 DIAGNOSIS — H90.3 SENSORY HEARING LOSS, BILATERAL: Primary | ICD-10-CM

## 2024-02-20 DIAGNOSIS — H90.3 SENSORINEURAL HEARING LOSS (SNHL) OF BOTH EARS: ICD-10-CM

## 2024-02-20 PROCEDURE — 92557 COMPREHENSIVE HEARING TEST: CPT

## 2024-02-20 PROCEDURE — 92567 TYMPANOMETRY: CPT

## 2024-02-20 NOTE — PROGRESS NOTES
HEARING EVALUATION    Name:  Reshma Gunn  :  1955  Age:  68 y.o.   MRN:  [id number]  Date of Evaluation: 24     HISTORY:     Reason for visit: Difficulty Understanding    Reshma Gunn is being seen today at the request of Dr. Herbert for an initial  evaluation of hearing.  Patient reports difficulty understanding low speaking people, especially when in noise.  Patient denies otalgia, otorrhea, and notes a positive history of dizziness, tinnitus, noise exposure, and family history of hearing loss.     EVALUATION:    Otoscopic Evaluation:   Right Ear: Unremarkable, canal clear   Left Ear: Unremarkable, canal clear    Tympanometry:   Right Ear: Type A; normal middle ear pressure and static compliance    Left Ear: Type A; normal middle ear pressure and static compliance     Speech Audiometry:  Speech Reception (SRT)   Right Ear: 15 dB HL   Left Ear: 15 dB HL    Word Recognition Scores (WRS):  Right Ear: excellent (96 % correct)     Left Ear: excellent (92 % correct)   Stimuli: [id number]    Pure Tone Audiometry:  Conventional pure tone audiometry from 250 - 8000 Hz  was obtained with good reliability and revealed the following:     Right Ear: Normal sloping to severe sensorineural hearing loss (SNHL)      Left Ear: Normal sloping to moderate sensorineural hearing loss (SNHL)       *see attached audiogram    IMPRESSIONS:   Normal sloping to severe high frequency SNHL in the right hear and normal sloping to moderate high frequency SNHL in the left ear.    RECOMMENDATIONS:  Annual hearing eval, Return to [facility]. for F/U, Hearing Aid Evaluation, and Copy to Patient/Caregiver    PATIENT EDUCATION:   The results of today's results and recommendations were reviewed with the patient and her hearing thresholds were explained at length. Treatment options, including amplification and communication strategies, were discussed as appropriate. The patient voiced understanding of her test results. Questions were addressed  and the patient was encouraged to contact our department should concerns arise.      Carlie Fall., [facility]  Clinical Audiologist  [facility] [place]

## 2024-02-21 DIAGNOSIS — E78.2 MIXED HYPERLIPIDEMIA: ICD-10-CM

## 2024-02-21 RX ORDER — ROSUVASTATIN CALCIUM 10 MG/1
10 TABLET, COATED ORAL DAILY
Qty: 90 TABLET | Refills: 3 | Status: SHIPPED | OUTPATIENT
Start: 2024-02-21

## 2024-03-20 ENCOUNTER — OFFICE VISIT (OUTPATIENT)
Dept: AUDIOLOGY | Facility: CLINIC | Age: 69
End: 2024-03-20
Payer: COMMERCIAL

## 2024-03-20 DIAGNOSIS — H90.3 SENSORY HEARING LOSS, BILATERAL: Primary | ICD-10-CM

## 2024-03-20 PROCEDURE — V5261 HEARING AID, DIGIT, BIN, BTE: HCPCS | Performed by: AUDIOLOGIST

## 2024-03-20 PROCEDURE — V5160 DISPENSING FEE BINAURAL: HCPCS | Performed by: AUDIOLOGIST

## 2024-03-20 NOTE — PROGRESS NOTES
Hearing Aid Evaluation  Name:  Reshma Gunn  :  1955  Age:  68 y.o.  MRN:  [id number]  Date of Evaluation: 24     HISTORY:    Reshma Gunn was seen today for a hearing aid evaluation following her audiometric testing performed on 24. Reshma was unaccompanied to today's visit. Reshma was referred by Dr. Herbert.     RESULTS REVIEW:    The audiometric findings were reviewed with the patient . All of the patient's questions regarding her hearing status were addressed, and the importance of realistic expectations of hearing loss and amplification were discussed.      DEVICE REVIEW & RECOMMENDATION:     Strengths and limitations of amplification, including various hearing aid styles, technology, options, and accessories were discussed at length with patient. Hearing aids are assistive devices and are not designed to restore normal hearing. The patient was counseled on the importance of self-advocacy, motivation, as well as effective communication strategies that can be used to optimize hearing aid success. Based on the degree of her hearing loss, preferences, and lifestyle needs, the following hearing recommendations were made:    The first hearing aid recommendation is Oticon Real 3.  The second hearing aid recommendation is Oticon Real 2.    [facility]'s office policies regarding our hearing aid program were reviewed, including the 45 day trial period, non-refundable return fees, as well as the  warranties and service plan. Hearing aid cost, and payment, as well as insurance benefit (if applicable) were discussed with the patient.     *See attached quote sheet    DEVICE SELECTION:    At this time, patient wishes to defer the purchase of hearing aids.  She wishes to speak to her  prior to making a decision.    Carlie Flores., CCC-A  Clinical Audiologist  [facility] [place]

## 2024-03-26 NOTE — PROGRESS NOTES
Progress Note    Name:  Reshma Gunn  :  1955  Age:  68 y.o.  MRN:  [id number]  Date of Evaluation: 24       Patient returned on 3/22/24 to pay her half down payment for Oticon Real 2 hearing aids. Order placed today for Oticon Real 2 hearing aids as outlined in her Evaluation Information form. Confirmation #[id number]    Bee Flores, CCC-A  Clinical Audiologist

## 2024-03-28 NOTE — PROGRESS NOTES
Progress Note    Name:  Reshma Gunn  :  1955  Age:  68 y.o.  MRN:  [id number]  Date of Evaluation: 24     HISTORY:    Received hearing aids from [facility] [place].       Left Device Right Device   Hearing Aid Make: Oticon  Oticon    Hearing Aid Model: Real 2 MiniRITE-R Real 2 MiniRITE-R   Serial Number: [id number]   Repair Warranty Date: 27   Loss/Damage Warranty Status:  Active  Active        Length/Output    Wax System: BrightFunnel MiniFit BrightFunnel MiniFit   Dome Size/Style: 8 OB 8OB   Battery: Silver-Zinc Rechargeable  Silver-Zinc Rechargeable       Earmold Serial Number: N/A N/A   Earmold Warranty Date:  N/A N/A    Serial Number:  [id number]    Warranty Date:  27     Accessories: N/A     ACTION/ADJUSTMENTS:    Front will schedule .    Carlie Flores., CCC-A  Clinical Audiologist     [Negative] : Heme/Lymph [Feeling Tired] : feeling tired [Heartburn] : heartburn [see HPI] : see HPI [History of kidney stones] : history of kidney stones [Wake up at night to urinate  How many times?  ___] : wakes up to urinate [unfilled] times during the night [Limb Swelling] : limb swelling [FreeTextEntry2] : htn

## 2024-04-08 ENCOUNTER — OFFICE VISIT (OUTPATIENT)
Dept: FAMILY MEDICINE CLINIC | Facility: CLINIC | Age: 69
End: 2024-04-08
Payer: MEDICARE

## 2024-04-08 VITALS
BODY MASS INDEX: 28.37 KG/M2 | TEMPERATURE: 97.7 F | OXYGEN SATURATION: 100 % | DIASTOLIC BLOOD PRESSURE: 64 MMHG | HEIGHT: 60 IN | WEIGHT: 144.5 LBS | SYSTOLIC BLOOD PRESSURE: 145 MMHG | HEART RATE: 64 BPM

## 2024-04-08 DIAGNOSIS — Z00.00 MEDICARE ANNUAL WELLNESS VISIT, SUBSEQUENT: Primary | ICD-10-CM

## 2024-04-08 DIAGNOSIS — I10 ESSENTIAL HYPERTENSION, BENIGN: ICD-10-CM

## 2024-04-08 DIAGNOSIS — F41.1 GENERALIZED ANXIETY DISORDER: ICD-10-CM

## 2024-04-08 PROCEDURE — 99214 OFFICE O/P EST MOD 30 MIN: CPT | Performed by: FAMILY MEDICINE

## 2024-04-08 PROCEDURE — G0439 PPPS, SUBSEQ VISIT: HCPCS | Performed by: FAMILY MEDICINE

## 2024-04-08 RX ORDER — LORAZEPAM 0.5 MG/1
0.5 TABLET ORAL EVERY 8 HOURS PRN
Qty: 150 TABLET | Refills: 1 | Status: SHIPPED | OUTPATIENT
Start: 2024-04-08

## 2024-04-08 NOTE — PROGRESS NOTES
Assessment and Plan:     Problem List Items Addressed This Visit       Generalized anxiety disorder     Patient has anxiety which is stable.  I queried the [place] prescription drug monitoring program.  There were no autumn flags and it was safe to proceed.  I refilled her prescription for lorazepam         Relevant Medications    LORazepam (ATIVAN) 0.5 mg tablet    Essential hypertension, benign     When I rechecked the patient's blood pressure I found it to be 156/72.  I asked the patient to bring her blood pressure cuff with her with for her next visit.  I want to verify the accuracy.  She tells me that nephrology has already done that and that her home blood pressure cuff was found to be accurate.  As such, I did not make any change with her medication.  She does need to bring in a logbook of her blood pressures when she returns.         Medicare annual wellness visit, subsequent - Primary       Depression Screening and Follow-up Plan: Patient was screened for depression during today's encounter. They screened negative with a PHQ-2 score of 0.      Preventive health issues were discussed with patient, and age appropriate screening tests were ordered as noted in patient's After Visit Summary.  Personalized health advice and appropriate referrals for health education or preventive services given if needed, as noted in patient's After Visit Summary.     History of Present Illness:     Patient presents for a Medicare Wellness Visit    This is a 68-year-old white female who presents to the office today for her annual Medicare wellness exam.  The patient tells me that she checks her blood pressures regularly at home.  Her blood pressure has been running between 127/68 and 133/70.  Tells me she can feel herself becoming anxious and even her face becoming red when she comes into the office.       Patient Care Team:  Yamil Herbert DO as PCP - General     Review of Systems:     Review of Systems   HENT:  Positive for  hearing loss.    Gastrointestinal:  Negative for abdominal distention, abdominal pain, blood in stool, constipation, diarrhea and nausea.   Genitourinary:  Negative for dysuria, frequency and urgency.        Problem List:     Patient Active Problem List   Diagnosis    Mixed hyperlipidemia    Generalized anxiety disorder    Essential hypertension, benign    Encounter for hepatitis C screening test for low risk patient    Encounter for long-term (current) use of medications    Epigastric pain    Hypokalemia    Sensorineural hearing loss (SNHL) of both ears    Sinus pain    Stage 3b chronic kidney disease (HCC)    Anemia    Nutritional anemia, unspecified    Medicare annual wellness visit, subsequent    Post-menopause    Seasonal allergic rhinitis due to pollen    Chronic rhinitis    Hoarseness      Past Medical and Surgical History:     Past Medical History:   Diagnosis Date    Allergic March 1997    Anxiety     Arthritis     AVM (arteriovenous malformation) of colon     Chronic kidney disease     Diverticulosis     Dyspepsia     Generalized anxiety disorder     last assessed: 8/14/2017    Heart murmur     Hemorrhoids     HL (hearing loss)     Hyperlipidemia     last assessed: 8/14/2017    Hypertension     last assessed: 8/14/2017    Interstitial cystitis     Irritable bowel     Mild chronic gastritis     Mitral valve prolapse     Osteoporosis     Urinary tract infection     Vertigo      Past Surgical History:   Procedure Laterality Date    COLONOSCOPY  06/06/2017    4mm polyp was found in the proximal sigmoid colon. The polyp was hyperplastic. The polyp was removed w/ a cold bx forceps. Resection and retrieval were complete. Bleeding internal hemorrhoids were found during retroflexion and during endocopy. The hemorrhoids were Grade II (internal hemorrhoids that prolapse but reduce spontaneously).    COLONOSCOPY W/ BIOPSIES  03/25/2009    bx's x6. Hemorrhoids. Two polyps.    EGD  04/03/2009    Mild antral gastritis.  Esophageal island.    FLEXIBLE SIGMOIDOSCOPY  04/19/2018    digital rectal exam revealed 1cm firm anal mass palpated 0 to 1 cm from the anal verge.The exam was otherwise without abnormality to 12 cm with nonbloody stool.    FRACTURE SURGERY  1983    JOINT REPLACEMENT      right knee    KNEE SURGERY      TONSILECTOMY AND ADNOIDECTOMY        Family History:     Family History   Problem Relation Age of Onset    Hypertension Mother     Skin cancer Mother     Breast cancer Mother     Hyperlipidemia Mother     Heart disease Mother         Aortic valve    Thyroid disease Mother         Hypothyroidism    Arthritis Mother     Hearing loss Mother     Hypertension Father     Stroke Father     No Known Problems Daughter       Social History:     Social History     Socioeconomic History    Marital status: /Civil Union     Spouse name: None    Number of children: None    Years of education: None    Highest education level: None   Occupational History    None   Tobacco Use    Smoking status: Never     Passive exposure: Never    Smokeless tobacco: Never   Vaping Use    Vaping status: Never Used   Substance and Sexual Activity    Alcohol use: Not Currently     Comment: rarely    Drug use: Never    Sexual activity: Yes     Partners: Male   Other Topics Concern    None   Social History Narrative    None     Social Determinants of Health     Financial Resource Strain: Low Risk  (4/2/2023)    Overall Financial Resource Strain (CARDIA)     Difficulty of Paying Living Expenses: Not hard at all   Food Insecurity: No Food Insecurity (4/1/2024)    Hunger Vital Sign     Worried About Running Out of Food in the Last Year: Never true     Ran Out of Food in the Last Year: Never true   Transportation Needs: No Transportation Needs (4/1/2024)    PRAPARE - Transportation     Lack of Transportation (Medical): No     Lack of Transportation (Non-Medical): No   Physical Activity: Not on file   Stress: Not on file   Social Connections: Not on  file   Intimate Partner Violence: Not on file   Housing Stability: Low Risk  (4/1/2024)    Housing Stability Vital Sign     Unable to Pay for Housing in the Last Year: No     Number of Places Lived in the Last Year: 1     Unstable Housing in the Last Year: No      Medications and Allergies:     Current Outpatient Medications   Medication Sig Dispense Refill    amLODIPine (NORVASC) 5 mg tablet TAKE 1 TABLET (5 MG TOTAL) BY MOUTH DAILY. 90 tablet 1    ascorbic acid (VITAMIN C) 250 MG tablet Take 500 mg by mouth 2 (two) times a day      Calcium Citrate-Vitamin D3 315-250 MG-UNIT TABS Take 1 tablet by mouth daily      hydrochlorothiazide (HYDRODIURIL) 25 mg tablet TAKE 1 TABLET (25 MG TOTAL) BY MOUTH DAILY. 90 tablet 1    ipratropium (ATROVENT) 0.03 % nasal spray SPRAY 2 SPRAYS INTO EACH NOSTRIL 3 TIMES A DAY 90 mL 2    LORazepam (ATIVAN) 0.5 mg tablet Take 1 tablet (0.5 mg total) by mouth every 8 (eight) hours as needed for anxiety 150 tablet 1    losartan (COZAAR) 50 mg tablet TAKE 1 TABLET BY MOUTH EVERY DAY 90 tablet 1    magnesium oxide (MAG-OX) 400 mg tablet Take 1 tablet (400 mg total) by mouth daily 90 tablet 2    metoprolol succinate (TOPROL-XL) 50 mg 24 hr tablet TAKE 1 TABLET BY MOUTH EVERY DAY 90 tablet 3    Multiple Vitamins-Minerals (CENTRUM SILVER 50+WOMEN) TABS Take by mouth      psyllium (METAMUCIL) 58.6 % packet Take 1 packet by mouth 2 (two) times a day 2 TSP DAILY       rosuvastatin (CRESTOR) 10 MG tablet TAKE 1 TABLET BY MOUTH EVERY DAY 90 tablet 3     No current facility-administered medications for this visit.     Allergies   Allergen Reactions    Medical Tape Rash      Immunizations:     Immunization History   Administered Date(s) Administered    COVID-19 MODERNA VACC 0.5 ML IM 03/24/2021, 04/22/2021, 12/03/2021    COVID-19 Moderna Vac BIVALENT 12 Yr+ IM 0.5 ML 11/01/2022    COVID-19 Moderna mRNA Vaccine 12 Yr+ 50 mcg/0.5 mL (Spikevax) 10/27/2023    Fluzone Split Quad 0.5 mL 10/10/2019     INFLUENZA 01/01/2008, 09/28/2014, 10/21/2015, 10/05/2016, 10/25/2017, 10/01/2018, 10/10/2019    Influenza, high dose seasonal 0.7 mL 09/27/2022    Influenza, injectable, quadrivalent, preservative free 0.5 mL 09/08/2020    Influenza, recombinant, quadrivalent,injectable, preservative free 09/24/2021    Pneumococcal Conjugate Vaccine 20-valent (Pcv20), Polysace 09/27/2022    Pneumococcal Polysaccharide PPV23 01/01/1998    Zoster Vaccine Recombinant 08/20/2020, 02/22/2021    influenza, injectable, quadrivalent 09/30/2023      Health Maintenance:         Topic Date Due    Breast Cancer Screening: Mammogram  10/20/2024    Colorectal Cancer Screening  06/06/2027    Hepatitis C Screening  Completed         Topic Date Due    COVID-19 Vaccine (6 - 2023-24 season) 12/22/2023      Medicare Screening Tests and Risk Assessments:     Reshma is here for her Subsequent Wellness visit. Last Medicare Wellness visit information reviewed, patient interviewed and updates made to the record today.      Health Risk Assessment:   Patient rates overall health as good. Patient feels that their physical health rating is same. Patient is very satisfied with their life. Eyesight was rated as same. Hearing was rated as same. Patient feels that their emotional and mental health rating is same. Patients states they are never, rarely angry. Patient states they are never, rarely unusually tired/fatigued. Pain experienced in the last 7 days has been none. Patient states that she has experienced no weight loss or gain in last 6 months.     Depression Screening:   PHQ-2 Score: 0      Fall Risk Screening:   In the past year, patient has experienced: no history of falling in past year      Urinary Incontinence Screening:   Patient has not leaked urine accidently in the last six months.     Home Safety:  Patient does not have trouble with stairs inside or outside of their home. Patient has working smoke alarms and has working carbon monoxide detector.  Home safety hazards include: none.     Nutrition:   Current diet is Low Cholesterol, No Added Salt and Limited junk food.     Medications:   Patient is currently taking over-the-counter supplements. OTC medications include: see medication list. Patient is able to manage medications.     Activities of Daily Living (ADLs)/Instrumental Activities of Daily Living (IADLs):   Walk and transfer into and out of bed and chair?: Yes  Dress and groom yourself?: Yes    Bathe or shower yourself?: Yes    Feed yourself? Yes  Do your laundry/housekeeping?: Yes  Manage your money, pay your bills and track your expenses?: Yes  Make your own meals?: Yes    Do your own shopping?: Yes    Previous Hospitalizations:   Any hospitalizations or ED visits within the last 12 months?: No      Advance Care Planning:   Living will: No    Durable POA for healthcare: No    Advanced directive: No      Cognitive Screening:   Provider or family/friend/caregiver concerned regarding cognition?: No    PREVENTIVE SCREENINGS      Cardiovascular Screening:    General: Screening Not Indicated and History Lipid Disorder      Diabetes Screening:     General: Screening Current      Colorectal Cancer Screening:     General: Screening Current      Breast Cancer Screening:     General: Screening Current      Cervical Cancer Screening:    General: Screening Current      Osteoporosis Screening:    General: Screening Current      Abdominal Aortic Aneurysm (AAA) Screening:        General: Screening Not Indicated      Lung Cancer Screening:     General: Screening Not Indicated      Hepatitis C Screening:    General: Screening Current    Screening, Brief Intervention, and Referral to Treatment (SBIRT)    Screening  Typical number of drinks in a day: 0  Typical number of drinks in a week: 0  Interpretation: Low risk drinking behavior.    AUDIT-C Screenin) How often did you have a drink containing alcohol in the past year? never  2) How many drinks did you have on a  typical day when you were drinking in the past year? 0  3) How often did you have 6 or more drinks on one occasion in the past year? never    AUDIT-C Score: 0  Interpretation: Score 0-2 (female): Negative screen for alcohol misuse    Single Item Drug Screening:  How often have you used an illegal drug (including marijuana) or a prescription medication for non-medical reasons in the past year? never    Single Item Drug Screen Score: 0  Interpretation: Negative screen for possible drug use disorder    No results found.     Physical Exam:     /64   Pulse 64   Temp 97.7 °F (36.5 °C) (Tympanic)   Ht 5' (1.524 m)   Wt 65.5 kg (144 lb 8 oz)   SpO2 100%   BMI 28.22 kg/m²     Physical Exam  Vitals reviewed.   Constitutional:       Comments: Patient is a 68-year-old white female who appears her stated age.  She is pleasant, cooperative, and in no distress   HENT:      Head: Normocephalic and atraumatic.      Right Ear: Tympanic membrane, ear canal and external ear normal. There is no impacted cerumen.      Left Ear: Tympanic membrane, ear canal and external ear normal. There is no impacted cerumen.      Mouth/Throat:      Mouth: Mucous membranes are moist.      Pharynx: Oropharynx is clear. No oropharyngeal exudate or posterior oropharyngeal erythema.   Eyes:      General: No scleral icterus.        Right eye: No discharge.         Left eye: No discharge.      Conjunctiva/sclera: Conjunctivae normal.      Pupils: Pupils are equal, round, and reactive to light.   Neck:      Vascular: No carotid bruit.      Comments: No thyromegaly  Cardiovascular:      Rate and Rhythm: Normal rate and regular rhythm.      Heart sounds: Normal heart sounds. No murmur heard.     No friction rub. No gallop.   Pulmonary:      Effort: Pulmonary effort is normal. No respiratory distress.      Breath sounds: Normal breath sounds. No stridor. No wheezing, rhonchi or rales.   Abdominal:      General: Bowel sounds are normal. There is no  distension.      Palpations: Abdomen is soft. There is no mass.      Tenderness: There is no abdominal tenderness. There is no guarding.   Musculoskeletal:      Cervical back: Neck supple.   Lymphadenopathy:      Cervical: No cervical adenopathy.   Psychiatric:         Mood and Affect: Mood normal.         Behavior: Behavior normal.         Thought Content: Thought content normal.         Judgment: Judgment normal.     Extremities: Without cyanosis, clubbing, or edema    Yamil Herbert DO

## 2024-04-08 NOTE — ASSESSMENT & PLAN NOTE
When I rechecked the patient's blood pressure I found it to be 156/72.  I asked the patient to bring her blood pressure cuff with her with for her next visit.  I want to verify the accuracy.  She tells me that nephrology has already done that and that her home blood pressure cuff was found to be accurate.  As such, I did not make any change with her medication.  She does need to bring in a logbook of her blood pressures when she returns.

## 2024-04-08 NOTE — ASSESSMENT & PLAN NOTE
Patient has anxiety which is stable.  I queried the [place] prescription drug monitoring program.  There were no autumn flags and it was safe to proceed.  I refilled her prescription for lorazepam

## 2024-04-08 NOTE — PATIENT INSTRUCTIONS
Medicare Preventive Visit Patient Instructions  Thank you for completing your Welcome to Medicare Visit or Medicare Annual Wellness Visit today. Your next wellness visit will be due in one year (4/9/2025).  The screening/preventive services that you may require over the next 5-10 years are detailed below. Some tests may not apply to you based off risk factors and/or age. Screening tests ordered at today's visit but not completed yet may show as past due. Also, please note that scanned in results may not display below.  Preventive Screenings:  Service Recommendations Previous Testing/Comments   Colorectal Cancer Screening  * Colonoscopy    * Fecal Occult Blood Test (FOBT)/Fecal Immunochemical Test (FIT)  * Fecal DNA/Cologuard Test  * Flexible Sigmoidoscopy Age: 45-75 years old   Colonoscopy: every 10 years (may be performed more frequently if at higher risk)  OR  FOBT/FIT: every 1 year  OR  Cologuard: every 3 years  OR  Sigmoidoscopy: every 5 years  Screening may be recommended earlier than age 45 if at higher risk for colorectal cancer. Also, an individualized decision between you and your healthcare provider will decide whether screening between the ages of 76-85 would be appropriate. Colonoscopy: 06/06/2017  FOBT/FIT: Not on file  Cologuard: Not on file  Sigmoidoscopy: Not on file    Screening Current     Breast Cancer Screening Age: 40+ years old  Frequency: every 1-2 years  Not required if history of left and right mastectomy Mammogram: 10/20/2023    Screening Current   Cervical Cancer Screening Between the ages of 21-29, pap smear recommended once every 3 years.   Between the ages of 30-65, can perform pap smear with HPV co-testing every 5 years.   Recommendations may differ for women with a history of total hysterectomy, cervical cancer, or abnormal pap smears in past. Pap Smear: 11/05/2020    Screening Not Indicated   Hepatitis C Screening Once for adults born between 1945 and 1965  More frequently in  patients at high risk for Hepatitis C Hep C Antibody: 08/19/2020    Screening Current   Diabetes Screening 1-2 times per year if you're at risk for diabetes or have pre-diabetes Fasting glucose: 95 mg/dL (1/4/2024)  A1C: No results in last 5 years (No results in last 5 years)  Screening Current   Cholesterol Screening Once every 5 years if you don't have a lipid disorder. May order more often based on risk factors. Lipid panel: 04/01/2023    Screening Not Indicated  History Lipid Disorder     Other Preventive Screenings Covered by Medicare:  Abdominal Aortic Aneurysm (AAA) Screening: covered once if your at risk. You're considered to be at risk if you have a family history of AAA.  Lung Cancer Screening: covers low dose CT scan once per year if you meet all of the following conditions: (1) Age 55-77; (2) No signs or symptoms of lung cancer; (3) Current smoker or have quit smoking within the last 15 years; (4) You have a tobacco smoking history of at least 20 pack years (packs per day multiplied by number of years you smoked); (5) You get a written order from a healthcare provider.  Glaucoma Screening: covered annually if you're considered high risk: (1) You have diabetes OR (2) Family history of glaucoma OR (3)  aged 50 and older OR (4)  American aged 65 and older  Osteoporosis Screening: covered every 2 years if you meet one of the following conditions: (1) You're estrogen deficient and at risk for osteoporosis based off medical history and other findings; (2) Have a vertebral abnormality; (3) On glucocorticoid therapy for more than 3 months; (4) Have primary hyperparathyroidism; (5) On osteoporosis medications and need to assess response to drug therapy.   Last bone density test (DXA Scan): 06/03/2021.  HIV Screening: covered annually if you're between the age of 15-65. Also covered annually if you are younger than 15 and older than 65 with risk factors for HIV infection. For pregnant  patients, it is covered up to 3 times per pregnancy.    Immunizations:  Immunization Recommendations   Influenza Vaccine Annual influenza vaccination during flu season is recommended for all persons aged >= 6 months who do not have contraindications   Pneumococcal Vaccine   * Pneumococcal conjugate vaccine = PCV13 (Prevnar 13), PCV15 (Vaxneuvance), PCV20 (Prevnar 20)  * Pneumococcal polysaccharide vaccine = PPSV23 (Pneumovax) Adults 19-65 yo with certain risk factors or if 65+ yo  If never received any pneumonia vaccine: recommend Prevnar 20 (PCV20)  Give PCV20 if previously received 1 dose of PCV13 or PPSV23   Hepatitis B Vaccine 3 dose series if at intermediate or high risk (ex: diabetes, end stage renal disease, liver disease)   Respiratory syncytial virus (RSV) Vaccine - COVERED BY MEDICARE PART D  * RSVPreF3 (Arexvy) CDC recommends that adults 60 years of age and older may receive a single dose of RSV vaccine using shared clinical decision-making (SCDM)   Tetanus (Td) Vaccine - COST NOT COVERED BY MEDICARE PART B Following completion of primary series, a booster dose should be given every 10 years to maintain immunity against tetanus. Td may also be given as tetanus wound prophylaxis.   Tdap Vaccine - COST NOT COVERED BY MEDICARE PART B Recommended at least once for all adults. For pregnant patients, recommended with each pregnancy.   Shingles Vaccine (Shingrix) - COST NOT COVERED BY MEDICARE PART B  2 shot series recommended in those 19 years and older who have or will have weakened immune systems or those 50 years and older     Health Maintenance Due:      Topic Date Due   • Breast Cancer Screening: Mammogram  10/20/2024   • Colorectal Cancer Screening  06/06/2027   • Hepatitis C Screening  Completed     Immunizations Due:      Topic Date Due   • COVID-19 Vaccine (6 - 2023-24 season) 12/22/2023     Advance Directives   What are advance directives?  Advance directives are legal documents that state your wishes  and plans for medical care. These plans are made ahead of time in case you lose your ability to make decisions for yourself. Advance directives can apply to any medical decision, such as the treatments you want, and if you want to donate organs.   What are the types of advance directives?  There are many types of advance directives, and each state has rules about how to use them. You may choose a combination of any of the following:  Living will:  This is a written record of the treatment you want. You can also choose which treatments you do not want, which to limit, and which to stop at a certain time. This includes surgery, medicine, IV fluid, and tube feedings.   Durable power of  for healthcare (DPAHC):  This is a written record that states who you want to make healthcare choices for you when you are unable to make them for yourself. This person, called a proxy, is usually a family member or a friend. You may choose more than 1 proxy.  Do not resuscitate (DNR) order:  A DNR order is used in case your heart stops beating or you stop breathing. It is a request not to have certain forms of treatment, such as CPR. A DNR order may be included in other types of advance directives.  Medical directive:  This covers the care that you want if you are in a coma, near death, or unable to make decisions for yourself. You can list the treatments you want for each condition. Treatment may include pain medicine, surgery, blood transfusions, dialysis, IV or tube feedings, and a ventilator (breathing machine).  Values history:  This document has questions about your views, beliefs, and how you feel and think about life. This information can help others choose the care that you would choose.  Why are advance directives important?  An advance directive helps you control your care. Although spoken wishes may be used, it is better to have your wishes written down. Spoken wishes can be misunderstood, or not followed.  Treatments may be given even if you do not want them. An advance directive may make it easier for your family to make difficult choices about your care.   Weight Management   Why it is important to manage your weight:  Being overweight increases your risk of health conditions such as heart disease, high blood pressure, type 2 diabetes, and certain types of cancer. It can also increase your risk for osteoarthritis, sleep apnea, and other respiratory problems. Aim for a slow, steady weight loss. Even a small amount of weight loss can lower your risk of health problems.  How to lose weight safely:  A safe and healthy way to lose weight is to eat fewer calories and get regular exercise. You can lose up about 1 pound a week by decreasing the number of calories you eat by 500 calories each day.   Healthy meal plan for weight management:  A healthy meal plan includes a variety of foods, contains fewer calories, and helps you stay healthy. A healthy meal plan includes the following:  Eat whole-grain foods more often.  A healthy meal plan should contain fiber. Fiber is the part of grains, fruits, and vegetables that is not broken down by your body. Whole-grain foods are healthy and provide extra fiber in your diet. Some examples of whole-grain foods are whole-wheat breads and pastas, oatmeal, brown rice, and bulgur.  Eat a variety of vegetables every day.  Include dark, leafy greens such as spinach, kale, megan greens, and mustard greens. Eat yellow and orange vegetables such as carrots, sweet potatoes, and winter squash.   Eat a variety of fruits every day.  Choose fresh or canned fruit (canned in its own juice or light syrup) instead of juice. Fruit juice has very little or no fiber.  Eat low-fat dairy foods.  Drink fat-free (skim) milk or 1% milk. Eat fat-free yogurt and low-fat cottage cheese. Try low-fat cheeses such as mozzarella and other reduced-fat cheeses.  Choose meat and other protein foods that are low in fat.   Choose beans or other legumes such as split peas or lentils. Choose fish, skinless poultry (chicken or turkey), or lean cuts of red meat (beef or pork). Before you cook meat or poultry, cut off any visible fat.   Use less fat and oil.  Try baking foods instead of frying them. Add less fat, such as margarine, sour cream, regular salad dressing and mayonnaise to foods. Eat fewer high-fat foods. Some examples of high-fat foods include french fries, doughnuts, ice cream, and cakes.  Eat fewer sweets.  Limit foods and drinks that are high in sugar. This includes candy, cookies, regular soda, and sweetened drinks.  Exercise:  Exercise at least 30 minutes per day on most days of the week. Some examples of exercise include walking, biking, dancing, and swimming. You can also fit in more physical activity by taking the stairs instead of the elevator or parking farther away from stores. Ask your healthcare provider about the best exercise plan for you.      © Copyright [employer] 2018 Information is for End User's use only and may not be sold, redistributed or otherwise used for commercial purposes. All illustrations and images included in CareNotes® are the copyrighted property of A.D.A.M., Inc. or [employer]

## 2024-04-09 ENCOUNTER — OFFICE VISIT (OUTPATIENT)
Dept: AUDIOLOGY | Facility: CLINIC | Age: 69
End: 2024-04-09

## 2024-04-09 DIAGNOSIS — H90.3 SENSORY HEARING LOSS, BILATERAL: Primary | ICD-10-CM

## 2024-04-09 NOTE — PROGRESS NOTES
Hearing Aid Fitting    Name:  Reshma Gunn  :  1955  Age:  68 y.o.  MRN:  [id number]  Date of Evaluation: 24     HISTORY:    Reshma Gunn was seen today for a binaural hearing aid fitting of her Oticon Real 2  in the canal (ERIC) hearing aid(s). Reshma was unaccompanied to today's visit. Hearing aid purchase is being paid by private Pay.    DEVICE INFORMATION:      Left Device Right Device   Hearing Aid Make: Oticon  Oticon    Hearing Aid Model: Real 2 MiniRITE-R Real 2 MiniRITE-R   Serial Number: [id number]   Repair Warranty Date: 27   Loss/Damage Warranty Status:  Active  Active         Length/Output    Wax System: [employer] MiniFit ProWax MiniFit   Dome Size/Style: 8 OB 8OB   Battery: Lithium-Ion Rechargeable Lithium-Ion Rechargeable        Earmold Serial Number: N/A N/A   Earmold Warranty Date:  N/A N/A    Serial Number:  [id number]    Warranty Date:  27     Accessories: N/A       DEVICE SETTINGS:    Hearing aid(s) were programmed using NAL-NL2 fitting formula and were adjusted based on patient perceived comfort level. Hearing aid(s) were set to experience level 1  per patient subjective listening preference. The patient noted good sound quality, and was happy with the overall sound quality and fit of the hearing aid(s).    DEVICE ORIENTATION:    The patient was counseled on device components and component function. Proper insertion and removal of the aid(s) was demonstrated. The patient practiced insertion and removal of the devices in the office, they demonstrated good ability to manipulate the hearing aids. Patient was given the devices users manual that reviews aid usage and operation, hearing aid cleaning tools, and hearing aid carrying case.     The hearing aid warranty, including unlimited repair and a one time loss and damage per hearing aid, through the , as well as [facility]'s hearing aid service plan, including  unlimited office visits, and supplies were outlined thoroughly. The patient agreed to the terms of sale listed on the purchase agreement containing device specifications, warranties, pricing information, as well as [facility]'s 45-day trial period timeline. After this period has elapsed, hearing aids cannot be returned.    Patient set up payment plan with billing to pay remainder of hearing aid balance.    DEVICE EXPECTATIONS & USAGE:     Hearing aids are assistive devices and are not designed to restore normal hearing sensitivity. The importance of realistic expectations, especially in the presence of background noise, was emphasized. The need for daily, consistent usage (8-12 hours per day) for proper device adjustment, as well as the importance of self-advocacy and practicing effective communication strategies was outlined.     Effective communication strategies include:  1.) Maintaining face-to-face communication, allowing for speechreading of facial expressions, lips, and gestures.  2.) Reducing background noise and distance between communication partners.  3.) Having communication partners reduce their rate of speech when appropriate.  4.) Beginning conversation by getting communication partner's attention.  5.) Asking for rephrasing of missed aspects of conversation rather than asking for repetition.    RECOMMENDATIONS:  The patient demonstrated understanding of all the topics discussed. The patientis to follow-up in 2-3 weeks for a hearing aid check within the trial period as scheduled.     Carlie Flores., CCC-A  Clinical Audiologist   [facility] [place]

## 2024-04-23 ENCOUNTER — OFFICE VISIT (OUTPATIENT)
Dept: AUDIOLOGY | Facility: CLINIC | Age: 69
End: 2024-04-23

## 2024-04-23 DIAGNOSIS — H90.3 SENSORY HEARING LOSS, BILATERAL: Primary | ICD-10-CM

## 2024-05-01 ENCOUNTER — TELEPHONE (OUTPATIENT)
Dept: NEPHROLOGY | Facility: CLINIC | Age: 69
End: 2024-05-01

## 2024-05-06 ENCOUNTER — APPOINTMENT (OUTPATIENT)
Dept: LAB | Facility: HOSPITAL | Age: 69
End: 2024-05-06
Payer: MEDICARE

## 2024-05-06 DIAGNOSIS — N18.32 STAGE 3B CHRONIC KIDNEY DISEASE (HCC): ICD-10-CM

## 2024-05-06 DIAGNOSIS — I10 HYPERTENSION, UNSPECIFIED TYPE: ICD-10-CM

## 2024-05-06 LAB
ALBUMIN SERPL BCP-MCNC: 4.4 G/DL (ref 3.5–5)
ALP SERPL-CCNC: 61 U/L (ref 34–104)
ALT SERPL W P-5'-P-CCNC: 22 U/L (ref 7–52)
ANION GAP SERPL CALCULATED.3IONS-SCNC: 8 MMOL/L (ref 4–13)
AST SERPL W P-5'-P-CCNC: 27 U/L (ref 13–39)
BACTERIA UR QL AUTO: ABNORMAL /HPF
BILIRUB SERPL-MCNC: 0.56 MG/DL (ref 0.2–1)
BILIRUB UR QL STRIP: NEGATIVE
BUN SERPL-MCNC: 19 MG/DL (ref 5–25)
CALCIUM SERPL-MCNC: 9.8 MG/DL (ref 8.4–10.2)
CHLORIDE SERPL-SCNC: 101 MMOL/L (ref 96–108)
CLARITY UR: CLEAR
CO2 SERPL-SCNC: 31 MMOL/L (ref 21–32)
COLOR UR: COLORLESS
CREAT SERPL-MCNC: 1.13 MG/DL (ref 0.6–1.3)
CREAT UR-MCNC: 49.3 MG/DL
GFR SERPL CREATININE-BSD FRML MDRD: 50 ML/MIN/1.73SQ M
GLUCOSE P FAST SERPL-MCNC: 91 MG/DL (ref 65–99)
GLUCOSE UR STRIP-MCNC: NEGATIVE MG/DL
HGB UR QL STRIP.AUTO: NEGATIVE
KETONES UR STRIP-MCNC: NEGATIVE MG/DL
LEUKOCYTE ESTERASE UR QL STRIP: NEGATIVE
MAGNESIUM SERPL-MCNC: 1.8 MG/DL (ref 1.9–2.7)
MICROALBUMIN UR-MCNC: <7 MG/L
MICROALBUMIN/CREAT 24H UR: <14 MG/G CREATININE (ref 0–30)
NITRITE UR QL STRIP: NEGATIVE
NON-SQ EPI CELLS URNS QL MICRO: ABNORMAL /HPF
PH UR STRIP.AUTO: 6.5 [PH]
PHOSPHATE SERPL-MCNC: 2.5 MG/DL (ref 2.3–4.1)
POTASSIUM SERPL-SCNC: 3.5 MMOL/L (ref 3.5–5.3)
PROT SERPL-MCNC: 6.8 G/DL (ref 6.4–8.4)
PROT UR STRIP-MCNC: NEGATIVE MG/DL
PTH-INTACT SERPL-MCNC: 52.7 PG/ML (ref 12–88)
RBC #/AREA URNS AUTO: ABNORMAL /HPF
SODIUM SERPL-SCNC: 140 MMOL/L (ref 135–147)
SP GR UR STRIP.AUTO: <1.005 (ref 1–1.03)
URATE SERPL-MCNC: 5 MG/DL (ref 2–7.5)
UROBILINOGEN UR STRIP-ACNC: <2 MG/DL
WBC #/AREA URNS AUTO: ABNORMAL /HPF

## 2024-05-06 PROCEDURE — 84100 ASSAY OF PHOSPHORUS: CPT

## 2024-05-06 PROCEDURE — 83970 ASSAY OF PARATHORMONE: CPT

## 2024-05-06 PROCEDURE — 82043 UR ALBUMIN QUANTITATIVE: CPT

## 2024-05-06 PROCEDURE — 82570 ASSAY OF URINE CREATININE: CPT

## 2024-05-06 PROCEDURE — 84550 ASSAY OF BLOOD/URIC ACID: CPT

## 2024-05-06 PROCEDURE — 81001 URINALYSIS AUTO W/SCOPE: CPT

## 2024-05-06 PROCEDURE — 36415 COLL VENOUS BLD VENIPUNCTURE: CPT

## 2024-05-06 PROCEDURE — 83735 ASSAY OF MAGNESIUM: CPT

## 2024-05-06 PROCEDURE — 80053 COMPREHEN METABOLIC PANEL: CPT

## 2024-05-08 PROBLEM — Z00.00 MEDICARE ANNUAL WELLNESS VISIT, SUBSEQUENT: Status: RESOLVED | Noted: 2023-04-03 | Resolved: 2024-05-08

## 2024-05-14 ENCOUNTER — OFFICE VISIT (OUTPATIENT)
Dept: NEPHROLOGY | Facility: CLINIC | Age: 69
End: 2024-05-14
Payer: MEDICARE

## 2024-05-14 VITALS
OXYGEN SATURATION: 99 % | SYSTOLIC BLOOD PRESSURE: 124 MMHG | BODY MASS INDEX: 27.88 KG/M2 | WEIGHT: 142 LBS | HEART RATE: 70 BPM | HEIGHT: 60 IN | DIASTOLIC BLOOD PRESSURE: 60 MMHG

## 2024-05-14 DIAGNOSIS — R76.8 ANA POSITIVE: ICD-10-CM

## 2024-05-14 DIAGNOSIS — R06.83 SNORING: ICD-10-CM

## 2024-05-14 DIAGNOSIS — I34.1 MITRAL VALVE PROLAPSE: ICD-10-CM

## 2024-05-14 DIAGNOSIS — I10 HYPERTENSION, UNSPECIFIED TYPE: Primary | ICD-10-CM

## 2024-05-14 DIAGNOSIS — N18.31 STAGE 3A CHRONIC KIDNEY DISEASE (HCC): ICD-10-CM

## 2024-05-14 PROCEDURE — G2211 COMPLEX E/M VISIT ADD ON: HCPCS | Performed by: INTERNAL MEDICINE

## 2024-05-14 PROCEDURE — 99215 OFFICE O/P EST HI 40 MIN: CPT | Performed by: INTERNAL MEDICINE

## 2024-05-14 RX ORDER — LOSARTAN POTASSIUM 50 MG/1
50 TABLET ORAL DAILY
Qty: 90 TABLET | Refills: 1 | Status: SHIPPED | OUTPATIENT
Start: 2024-05-14

## 2024-05-14 NOTE — PATIENT INSTRUCTIONS
You have stable stage 3 kidney disease, most recent function is 50%.   Recommend to bring BP cuff into the office at home.  Will check other causes of high blood pressure.check vascular US, echocardiogram of heart and sleep evaluation.   Your blood pressure is overall reasonably well controlled and you are not on maximal doses of medications. IF bp GREATER THAN 140/90, call the office.   Check BP once a day either pre medication or POST medication for 1 hour.  Follow up in 6 months.

## 2024-05-14 NOTE — PROGRESS NOTES
Assessment & Plan:    1. Hypertension, unspecified type  -     VAS renal artery complete; Future; Expected date: 05/14/2024  -     Ambulatory Referral to Sleep Medicine; Future  -     Echo complete w/ contrast if indicated; Future; Expected date: 05/14/2024  -     losartan (COZAAR) 50 mg tablet; Take 1 tablet (50 mg total) by mouth daily  -     Urinalysis with microscopic; Future; Expected date: 10/16/2024  -     Comprehensive metabolic panel; Future; Expected date: 10/16/2024  -     Albumin / creatinine urine ratio; Future; Expected date: 10/16/2024  -     Protein / creatinine ratio, urine; Future; Expected date: 10/16/2024  -     PTH, intact; Future; Expected date: 10/16/2024  2. Snoring  -     Urinalysis with microscopic; Future; Expected date: 10/16/2024  -     Comprehensive metabolic panel; Future; Expected date: 10/16/2024  -     Albumin / creatinine urine ratio; Future; Expected date: 10/16/2024  -     Protein / creatinine ratio, urine; Future; Expected date: 10/16/2024  -     PTH, intact; Future; Expected date: 10/16/2024  3. Mitral valve prolapse  -     Echo complete w/ contrast if indicated; Future; Expected date: 05/14/2024  -     Urinalysis with microscopic; Future; Expected date: 10/16/2024  -     Comprehensive metabolic panel; Future; Expected date: 10/16/2024  -     Albumin / creatinine urine ratio; Future; Expected date: 10/16/2024  -     Protein / creatinine ratio, urine; Future; Expected date: 10/16/2024  -     PTH, intact; Future; Expected date: 10/16/2024  4. JUAN MIGUEL positive  -     Urinalysis with microscopic; Future; Expected date: 10/16/2024  -     Comprehensive metabolic panel; Future; Expected date: 10/16/2024  -     Albumin / creatinine urine ratio; Future; Expected date: 10/16/2024  -     Protein / creatinine ratio, urine; Future; Expected date: 10/16/2024  -     PTH, intact; Future; Expected date: 10/16/2024  5. Stage 3a chronic kidney disease (HCC)       HTN  BP under ideal control in  office and under reasonable control at home when she periodically checks BP.  Recommend to pursue further workup as patient kerri like more extensive evaluation. She is on 4 antihypertensive including a diuretic and currently under reasonable control.  Recommend to bring BP cuff into office to have it validated.   Check daily BP either pre medications or 1 hr post medication. If BP consistently greater than 140/90, call the office.    Will check vascular duplex , no recent renal arterial imaging.  Ambulatory referral to sleep medicine for sleep study.  Check echocardiogram.  Renew losartan at 50mg/day.  Follow up in 6 months with labs prior.      2 Snoring  She has a slender neck and is not obese but may still have DEVI. No significant reflex issues noted.      3. Hx MVP  No recent echocardiogram and remote hx cardiology eval.  Will repeat echocardiogram to evaluate for LVH/ valvular abnormalities      4. JUAN MIGUEL +  Scleroderma diagnostic panel considered negative, 0.9.   Suspect JUAN MIGUEL not reflecting autoimmune disease.  Denies arthralgias/rashes/ joint inflammation.    5. 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  Cr trends stable between 1.1-1.2 mg/dl.  5/6 Cr stable at 1.13 with eGFR 50ml/min.  No evidence of albuminuria.   Volume status appears compensated.  Patient will fu in 6 months with labs prior.   Patient will require ongoing complex care management for CKD monitoring with renal indices.   The benefits, risks and alternatives to the treatment plan were discussed at this visit. Patient was advised of common adverse effects of any medical therapies prescribed. All questions were answered and discussed with the patient and any accompanying family members or caretakers.      Subjective:      Patient ID: Reshma Gunn is a 68 y.o. female who presents for follow up in the [place] office for CKD/HTN management. She had previous aldosterone/ pheo workup that was negative.  Previous JUAN MIGUEL + and  Antiscleroderma- 70 ab + but further testing with scleroderma diagnostic panel negative.    HPI    In the interim since last visit, denies any new medical conditions, surgeries, allergies or medications.  She is frustrated that with successive medication additions her BP is not significantly changed. Her BP is well controlled in the visit and at home is in the 120-140/70-80 range. She checks periodically and does wait for a period of 5 minutes prior to checking BP. Requesting further evaluation in to high BP. She has a hx MVP but not actively following with cardiology and no recent echocardiogram.  She admits to snoring but has not been diagnosed with DEVI and denies sleep testing.     Walking 3 miles a day. Denies NSAID use.     She does admit to snoring     The following portions of the patient's history were reviewed and updated as appropriate: allergies, current medications, past family history, past medical history, past social history, past surgical history, and problem list.    Review of Systems   Respiratory: Negative.     Cardiovascular: Negative.    Genitourinary: Negative.    All other systems reviewed and are negative.        Objective:      /60 (BP Location: Right arm, Patient Position: Sitting, Cuff Size: Standard)   Pulse 70   Ht 5' (1.524 m)   Wt 64.4 kg (142 lb)   SpO2 99%   BMI 27.73 kg/m²          Physical Exam  Vitals reviewed.   Constitutional:       General: She is not in acute distress.  HENT:      Head: Normocephalic and atraumatic.      Nose: Nose normal. No congestion.      Mouth/Throat:      Mouth: Mucous membranes are moist.      Pharynx: Oropharynx is clear.   Eyes:      Extraocular Movements: Extraocular movements intact.      Conjunctiva/sclera: Conjunctivae normal.   Cardiovascular:      Rate and Rhythm: Normal rate and regular rhythm.      Heart sounds:      No friction rub.   Pulmonary:      Breath sounds: No wheezing, rhonchi or rales.   Abdominal:      General: There is  no distension.      Tenderness: There is no abdominal tenderness. There is no guarding.   Musculoskeletal:      Right lower leg: No edema.      Left lower leg: No edema.   Skin:     Coloration: Skin is not jaundiced.      Findings: No bruising or rash.   Neurological:      General: No focal deficit present.      Mental Status: She is alert and oriented to person, place, and time. Mental status is at baseline.      Cranial Nerves: No cranial nerve deficit.   Psychiatric:         Mood and Affect: Mood normal.         Behavior: Behavior normal.             Lab Results   Component Value Date     09/16/2015    SODIUM 140 05/06/2024    K 3.5 05/06/2024     05/06/2024    CO2 31 05/06/2024    ANIONGAP 4 09/16/2015    AGAP 8 05/06/2024    BUN 19 05/06/2024    CREATININE 1.13 05/06/2024    GLUC 91 06/14/2023    GLUF 91 05/06/2024    CALCIUM 9.8 05/06/2024    AST 27 05/06/2024    ALT 22 05/06/2024    ALKPHOS 61 05/06/2024    PROT 6.8 09/16/2015    TP 6.8 05/06/2024    BILITOT 0.64 09/16/2015    TBILI 0.56 05/06/2024    EGFR 50 05/06/2024      Lab Results   Component Value Date    CREATININE 1.13 05/06/2024    CREATININE 1.27 01/04/2024    CREATININE 1.28 10/18/2023    CREATININE 1.40 (H) 09/25/2023    CREATININE 1.20 09/09/2023    CREATININE 1.25 06/14/2023    CREATININE 1.49 (H) 04/01/2023    CREATININE 1.11 09/17/2022    CREATININE 0.99 03/19/2022    CREATININE 0.98 09/16/2021    CREATININE 0.89 12/18/2020    CREATININE 0.73 08/04/2016    CREATININE 0.74 09/16/2015    CREATININE 0.68 03/06/2015    CREATININE 0.68 01/07/2015      Lab Results   Component Value Date    COLORU Colorless 05/06/2024    CLARITYU Clear 05/06/2024    SPECGRAV <1.005 (L) 05/06/2024    PHUR 6.5 05/06/2024    LEUKOCYTESUR Negative 05/06/2024    NITRITE Negative 05/06/2024    PROTEIN UA Negative 05/06/2024    GLUCOSEU Negative 05/06/2024    KETONESU Negative 05/06/2024    UROBILINOGEN <2.0 05/06/2024    BILIRUBINUR Negative 05/06/2024     "BLOODU Negative 05/06/2024    RBCUA None Seen 05/06/2024    WBCUA None Seen 05/06/2024    EPIS Occasional 05/06/2024    BACTERIA None Seen 05/06/2024      No results found for: \"LABPROT\"  No results found for: \"MICROALBUR\", \"XEAS56DTX\"  Lab Results   Component Value Date    WBC 6.99 01/04/2024    HGB 12.8 01/04/2024    HCT 39.1 01/04/2024    MCV 94 01/04/2024     01/04/2024      Lab Results   Component Value Date    HGB 12.8 01/04/2024    HGB 11.4 (L) 04/13/2023    HGB 11.2 (L) 04/01/2023    HGB 13.2 12/18/2020    HGB 13.6 08/04/2016      Lab Results   Component Value Date    IRON 96 04/13/2023    TIBC 285 04/13/2023    FERRITIN 110 04/13/2023      No results found for: \"PTHCALCIUM\", \"[id number]\", \"PHOSPHORUS\"   Lab Results   Component Value Date    CHOLESTEROL 145 04/01/2023    HDL 49 (L) 04/01/2023    LDLCALC 73 04/01/2023    TRIG 117 04/01/2023      Lab Results   Component Value Date    URICACID 5.0 05/06/2024      No results found for: \"HGBA1C\"   No results found for: \"TSHANTIBODY\", \"M5SRFDR\", \"FREET4\"   Lab Results   Component Value Date    JUAN MIGUEL Positive (A) 06/14/2023      Lab Results   Component Value Date    PROT 6.8 09/16/2015    UPEP See Comment 06/14/2023        Portions of the record may have been created with voice recognition software. Occasional wrong word or \"sound a like\" substitutions may have occurred due to the inherent limitations of voice recognition software. Read the chart carefully and recognize, using context, where substitutions have occurred. If you have any questions, please contact the dictating provider.      "

## 2024-05-17 DIAGNOSIS — G47.8 OTHER SLEEP DISORDERS: Primary | ICD-10-CM

## 2024-05-17 DIAGNOSIS — I10 HYPERTENSION, UNSPECIFIED TYPE: ICD-10-CM

## 2024-06-20 ENCOUNTER — HOSPITAL ENCOUNTER (OUTPATIENT)
Dept: SLEEP CENTER | Facility: HOSPITAL | Age: 69
Discharge: HOME | End: 2024-06-20
Attending: INTERNAL MEDICINE
Payer: MEDICARE

## 2024-06-20 DIAGNOSIS — G47.8 OTHER SLEEP DISORDERS: ICD-10-CM

## 2024-06-20 DIAGNOSIS — I10 HYPERTENSION, UNSPECIFIED TYPE: ICD-10-CM

## 2024-06-20 PROCEDURE — G0399 HOME SLEEP TEST/TYPE 3 PORTA: HCPCS

## 2024-06-21 NOTE — PROGRESS NOTES
Home Sleep Study Documentation    HOME STUDY DEVICE: Noxturnal no                                           Rachel G3 yes      Pre-Sleep Home Study:    Set-up and instructions performed by: Roseann Ware     Technician performed demonstration for Patient: yes    Return demonstration performed by Patient: yes    Written instructions provided to Patient: yes    Patient signed consent form: yes        Post-Sleep Home Study:    Additional comments by Patient: pending    Home Sleep Study Failed:pending    Failure reason: pending    Reported or Detected: pending    Scored by: pending

## 2024-06-26 PROBLEM — G47.33 OSA (OBSTRUCTIVE SLEEP APNEA): Status: ACTIVE | Noted: 2024-06-26

## 2024-07-01 ENCOUNTER — TELEPHONE (OUTPATIENT)
Dept: NEPHROLOGY | Facility: CLINIC | Age: 69
End: 2024-07-01

## 2024-07-01 NOTE — TELEPHONE ENCOUNTER
Patient called back- I read her Dr. Colon's message:    ----- Message from Kisha Colon DO sent at 6/30/2024 11:53 PM EDT -----  Please tell patient her sleep study shows mild sleep apnea and she would benefit from seeing a sleep specialist, would she like a referral?        She verbalized understanding, but she already has an appointment for a sleep consult scheduled for February 10th.

## 2024-07-01 NOTE — TELEPHONE ENCOUNTER
----- Message from Kisha Colon DO sent at 6/30/2024 11:53 PM EDT -----  Please tell patient her sleep study shows mild sleep apnea and she would benefit from seeing a sleep specialist, would she like a referral?

## 2024-07-06 ENCOUNTER — APPOINTMENT (OUTPATIENT)
Dept: LAB | Facility: HOSPITAL | Age: 69
End: 2024-07-06
Payer: MEDICARE

## 2024-07-06 DIAGNOSIS — E78.2 MIXED HYPERLIPIDEMIA: ICD-10-CM

## 2024-07-06 LAB
ALBUMIN SERPL BCG-MCNC: 4.6 G/DL (ref 3.5–5)
ALP SERPL-CCNC: 67 U/L (ref 34–104)
ALT SERPL W P-5'-P-CCNC: 26 U/L (ref 7–52)
ANION GAP SERPL CALCULATED.3IONS-SCNC: 9 MMOL/L (ref 4–13)
AST SERPL W P-5'-P-CCNC: 28 U/L (ref 13–39)
BILIRUB SERPL-MCNC: 0.85 MG/DL (ref 0.2–1)
BUN SERPL-MCNC: 18 MG/DL (ref 5–25)
CALCIUM SERPL-MCNC: 10 MG/DL (ref 8.4–10.2)
CHLORIDE SERPL-SCNC: 103 MMOL/L (ref 96–108)
CHOLEST SERPL-MCNC: 159 MG/DL
CO2 SERPL-SCNC: 29 MMOL/L (ref 21–32)
CREAT SERPL-MCNC: 1.16 MG/DL (ref 0.6–1.3)
GFR SERPL CREATININE-BSD FRML MDRD: 48 ML/MIN/1.73SQ M
GLUCOSE P FAST SERPL-MCNC: 95 MG/DL (ref 65–99)
HDLC SERPL-MCNC: 50 MG/DL
LDLC SERPL CALC-MCNC: 84 MG/DL (ref 0–100)
NONHDLC SERPL-MCNC: 109 MG/DL
POTASSIUM SERPL-SCNC: 3.6 MMOL/L (ref 3.5–5.3)
PROT SERPL-MCNC: 6.8 G/DL (ref 6.4–8.4)
SODIUM SERPL-SCNC: 141 MMOL/L (ref 135–147)
TRIGL SERPL-MCNC: 123 MG/DL

## 2024-07-06 PROCEDURE — 80061 LIPID PANEL: CPT

## 2024-07-06 PROCEDURE — 80053 COMPREHEN METABOLIC PANEL: CPT

## 2024-07-06 PROCEDURE — 36415 COLL VENOUS BLD VENIPUNCTURE: CPT

## 2024-07-08 ENCOUNTER — OFFICE VISIT (OUTPATIENT)
Dept: FAMILY MEDICINE CLINIC | Facility: CLINIC | Age: 69
End: 2024-07-08
Payer: MEDICARE

## 2024-07-08 VITALS
HEART RATE: 67 BPM | HEIGHT: 60 IN | DIASTOLIC BLOOD PRESSURE: 74 MMHG | BODY MASS INDEX: 27.64 KG/M2 | TEMPERATURE: 98.6 F | OXYGEN SATURATION: 99 % | WEIGHT: 140.8 LBS | SYSTOLIC BLOOD PRESSURE: 134 MMHG

## 2024-07-08 DIAGNOSIS — R53.83 OTHER FATIGUE: ICD-10-CM

## 2024-07-08 DIAGNOSIS — R00.2 PALPITATIONS: ICD-10-CM

## 2024-07-08 DIAGNOSIS — E87.6 HYPOKALEMIA: ICD-10-CM

## 2024-07-08 DIAGNOSIS — N18.31 STAGE 3A CHRONIC KIDNEY DISEASE (HCC): ICD-10-CM

## 2024-07-08 DIAGNOSIS — I10 ESSENTIAL HYPERTENSION, BENIGN: Primary | ICD-10-CM

## 2024-07-08 DIAGNOSIS — E78.2 MIXED HYPERLIPIDEMIA: ICD-10-CM

## 2024-07-08 PROCEDURE — 99214 OFFICE O/P EST MOD 30 MIN: CPT | Performed by: FAMILY MEDICINE

## 2024-07-08 PROCEDURE — G2211 COMPLEX E/M VISIT ADD ON: HCPCS | Performed by: FAMILY MEDICINE

## 2024-07-08 NOTE — ASSESSMENT & PLAN NOTE
Lab Results   Component Value Date    EGFR 48 07/06/2024    EGFR 49 (L) 06/14/2024    EGFR 50 05/06/2024    CREATININE 1.16 07/06/2024    CREATININE 1.21 (H) 06/14/2024    CREATININE 1.13 05/06/2024   As noted, recent creatinine was 1.16.  Estimated GFR was 48.  Renal function remained stable.

## 2024-07-08 NOTE — PROGRESS NOTES
Ambulatory Visit  Name: Reshma Gunn      : 1955      MRN: [id number]  Encounter Provider: Yamil Herbert DO  Encounter Date: 2024   Encounter department: [facility]    Assessment & Plan   1. Essential hypertension, benign  Assessment & Plan:  Patient has hypertension.  She does check her blood pressures regularly at home.  She finds them to be well-controlled although she did not have a log book with her or have her blood pressures today.  I did check her blood pressure myself in the office today.  I found her blood pressure to be 134/74 in the left arm.  I recommended no change with her current medications.  She will continue amlodipine, metoprolol, and hydrochlorothiazide.  I reviewed her most recent labs.  Potassium was 3.6.  Creatinine was 1.16.  2. Mixed hyperlipidemia  Assessment & Plan:  I reviewed with the patient her fasting lipid panel.  Total cholesterol was 159.  Triglycerides were 123.  HDL was 50.  LDL was 84.  Her goal is less than 100.  She will continue rosuvastatin 10 mg daily.  Orders:  -     LDL cholesterol, direct; Future  3. Hypokalemia  -     Comprehensive metabolic panel; Future  4. Other fatigue  -     CBC and differential; Future  -     TSH, 3rd generation with Free T4 reflex; Future  5. Stage 3a chronic kidney disease (HCC)  Assessment & Plan:  Lab Results   Component Value Date    EGFR 48 2024    EGFR 49 (L) 2024    EGFR 50 2024    CREATININE 1.16 2024    CREATININE 1.21 (H) 2024    CREATININE 1.13 2024   As noted, recent creatinine was 1.16.  Estimated GFR was 48.  Renal function remained stable.  6. Palpitations  Assessment & Plan:  Patient had recent episode of palpitations which was evaluated at the emergency department at [facility].  She underwent an echocardiogram which showed normal left ventricular function.  Normal left ventricular size.  No evidence of mitral valve prolapse or any anomaly of the mitral valve.   Holter monitor showed a normal sinus rhythm predominantly.  She did have symptoms while wearing her Holter monitor but she was in sinus rhythm at that time.  I explained to the patient that that goes along with anxiety.  She reported to me that the emergency room doctor told her she had irregular beats on her EKG.  I was unable to find the EKG but I was able to review the ER report.  She had a normal sinus rhythm on her EKG with no ectopy noted.  There was a normal EKG.  I have not recommended any further workup for the patient.  Continue lorazepam as needed.  Continue metoprolol.       History of Present Illness     Patient is a 68-year-old white female who presents to the office today for her routine checkup.  Patient reports that she had a recent episode of palpitations while in bed at night.  She went to the emergency department for evaluation.  She underwent echocardiogram and Holter monitor testing.  She reports she was diagnosed with mitral valve prolapse many years ago by Dr. Andrea Mohamud.  She tells me at that time, she had even taken antibiotic prophylaxis.  Patient had blood work done recently.  She feels well and otherwise.  She remains active.        Review of Systems   Constitutional:  Negative for activity change and appetite change.   Cardiovascular:  Positive for palpitations. Negative for chest pain and leg swelling.   Gastrointestinal:  Negative for abdominal distention, abdominal pain, blood in stool, constipation, diarrhea and nausea.       Objective     /74 (BP Location: Left arm, Patient Position: Sitting, Cuff Size: Standard)   Pulse 67   Temp 98.6 °F (37 °C) (Temporal)   Ht 5' (1.524 m)   Wt 63.9 kg (140 lb 12.8 oz)   SpO2 99%   BMI 27.50 kg/m²     Physical Exam  Vitals reviewed.   Constitutional:       Comments: This is a 68-year-old white female who appears her stated age.  Patient is pleasant, cooperative, and in no distress.   HENT:      Head: Normocephalic and atraumatic.       Right Ear: Tympanic membrane, ear canal and external ear normal. There is no impacted cerumen.      Left Ear: Tympanic membrane, ear canal and external ear normal. There is no impacted cerumen.      Mouth/Throat:      Mouth: Mucous membranes are moist.      Pharynx: Oropharynx is clear. No oropharyngeal exudate or posterior oropharyngeal erythema.   Eyes:      General: No scleral icterus.        Right eye: No discharge.         Left eye: No discharge.      Conjunctiva/sclera: Conjunctivae normal.      Pupils: Pupils are equal, round, and reactive to light.   Cardiovascular:      Rate and Rhythm: Normal rate and regular rhythm.      Heart sounds: Normal heart sounds. No murmur heard.     No friction rub. No gallop.   Pulmonary:      Effort: Pulmonary effort is normal. No respiratory distress.      Breath sounds: Normal breath sounds. No stridor. No wheezing, rhonchi or rales.   Abdominal:      General: Bowel sounds are normal. There is no distension.      Palpations: Abdomen is soft. There is no mass.      Tenderness: There is no abdominal tenderness. There is no guarding.   Musculoskeletal:      Cervical back: Neck supple.   Lymphadenopathy:      Cervical: No cervical adenopathy.   Psychiatric:         Mood and Affect: Mood normal.         Behavior: Behavior normal.         Thought Content: Thought content normal.         Judgment: Judgment normal.     Extremities: There is no cyanosis, clubbing, or edema present.    Administrative Statements

## 2024-07-08 NOTE — ASSESSMENT & PLAN NOTE
I reviewed with the patient her fasting lipid panel.  Total cholesterol was 159.  Triglycerides were 123.  HDL was 50.  LDL was 84.  Her goal is less than 100.  She will continue rosuvastatin 10 mg daily.

## 2024-07-08 NOTE — ASSESSMENT & PLAN NOTE
Patient has hypertension.  She does check her blood pressures regularly at home.  She finds them to be well-controlled although she did not have a log book with her or have her blood pressures today.  I did check her blood pressure myself in the office today.  I found her blood pressure to be 134/74 in the left arm.  I recommended no change with her current medications.  She will continue amlodipine, metoprolol, and hydrochlorothiazide.  I reviewed her most recent labs.  Potassium was 3.6.  Creatinine was 1.16.

## 2024-07-08 NOTE — ASSESSMENT & PLAN NOTE
Patient had recent episode of palpitations which was evaluated at the emergency department at [facility].  She underwent an echocardiogram which showed normal left ventricular function.  Normal left ventricular size.  No evidence of mitral valve prolapse or any anomaly of the mitral valve.  Holter monitor showed a normal sinus rhythm predominantly.  She did have symptoms while wearing her Holter monitor but she was in sinus rhythm at that time.  I explained to the patient that that goes along with anxiety.  She reported to me that the emergency room doctor told her she had irregular beats on her EKG.  I was unable to find the EKG but I was able to review the ER report.  She had a normal sinus rhythm on her EKG with no ectopy noted.  There was a normal EKG.  I have not recommended any further workup for the patient.  Continue lorazepam as needed.  Continue metoprolol.

## 2024-07-10 ENCOUNTER — TELEPHONE (OUTPATIENT)
Dept: NEPHROLOGY | Facility: CLINIC | Age: 69
End: 2024-07-10

## 2024-07-10 ENCOUNTER — HOSPITAL ENCOUNTER (OUTPATIENT)
Dept: NON INVASIVE DIAGNOSTICS | Facility: HOSPITAL | Age: 69
Discharge: HOME | End: 2024-07-10
Attending: INTERNAL MEDICINE
Payer: MEDICARE

## 2024-07-10 DIAGNOSIS — I10 HYPERTENSION, UNSPECIFIED TYPE: ICD-10-CM

## 2024-07-10 PROCEDURE — 93975 VASCULAR STUDY: CPT | Performed by: SURGERY

## 2024-07-10 PROCEDURE — 93975 VASCULAR STUDY: CPT

## 2024-07-10 NOTE — TELEPHONE ENCOUNTER
"Called and spoke to pt. Aware of results, Please call patient, no kidney artery narrowing noted on kidney artery ultrasound. There was narrowing of the celiac and superior mesenteric arteries that supply organs like the intestines. Does she have any abdominal pain, early satiety, weight loss, persistent nausea/vomiting?\"     Pt states she has none of symptoms listed above. She would like to know what to do moving forward with narrowing. Please advise.  "

## 2024-07-10 NOTE — TELEPHONE ENCOUNTER
----- Message from Kisha Colon DO sent at 7/10/2024  3:28 PM EDT -----  Please call patient, no kidney artery narrowing noted on kidney artery ultrasound. There was narrowing of the celiac and superior mesenteric arteries that supply organs like the intestines. Does she have any abdominal pain, early satiety, weight loss, persistent nausea/vomiting?

## 2024-07-11 DIAGNOSIS — K55.1 MESENTERIC ARTERY STENOSIS (HCC): Primary | ICD-10-CM

## 2024-07-11 NOTE — TELEPHONE ENCOUNTER
Called and spoke to pt, she will like to see vascular surgeon. Please place referral.    Called and spoke to Vascular Surgeon, an appt was made for pt for 09/9. Pt is aware of this appt. She has also been placed on waitlist. (07/11/24)

## 2024-07-14 DIAGNOSIS — N18.32 STAGE 3B CHRONIC KIDNEY DISEASE (HCC): ICD-10-CM

## 2024-07-14 RX ORDER — AMLODIPINE BESYLATE 5 MG/1
5 TABLET ORAL DAILY
Qty: 90 TABLET | Refills: 1 | Status: SHIPPED | OUTPATIENT
Start: 2024-07-14

## 2024-07-18 DIAGNOSIS — N18.32 STAGE 3B CHRONIC KIDNEY DISEASE (HCC): ICD-10-CM

## 2024-07-18 RX ORDER — HYDROCHLOROTHIAZIDE 25 MG/1
25 TABLET ORAL DAILY
Qty: 90 TABLET | Refills: 1 | Status: SHIPPED | OUTPATIENT
Start: 2024-07-18

## 2024-08-16 DIAGNOSIS — I10 HYPERTENSION, UNSPECIFIED TYPE: ICD-10-CM

## 2024-08-16 RX ORDER — LOSARTAN POTASSIUM 50 MG/1
50 TABLET ORAL DAILY
Qty: 90 TABLET | Refills: 1 | Status: SHIPPED | OUTPATIENT
Start: 2024-08-16

## 2024-09-06 ENCOUNTER — TELEPHONE (OUTPATIENT)
Dept: VASCULAR SURGERY | Facility: CLINIC | Age: 69
End: 2024-09-06

## 2024-09-06 NOTE — TELEPHONE ENCOUNTER
Lvm to move appt time to 1:30 in [place] asked pt to confirm this time if she can t make she will need to be r/s can also be scheduled w/ an ap

## 2024-09-09 ENCOUNTER — OFFICE VISIT (OUTPATIENT)
Dept: VASCULAR SURGERY | Facility: HOSPITAL | Age: 69
End: 2024-09-09
Attending: INTERNAL MEDICINE
Payer: MEDICARE

## 2024-09-09 VITALS
HEIGHT: 60 IN | BODY MASS INDEX: 28.07 KG/M2 | SYSTOLIC BLOOD PRESSURE: 146 MMHG | DIASTOLIC BLOOD PRESSURE: 70 MMHG | WEIGHT: 143 LBS | HEART RATE: 74 BPM

## 2024-09-09 DIAGNOSIS — K55.1 MESENTERIC ARTERY STENOSIS (HCC): ICD-10-CM

## 2024-09-09 PROCEDURE — 99204 OFFICE O/P NEW MOD 45 MIN: CPT | Performed by: SURGERY

## 2024-09-09 NOTE — ASSESSMENT & PLAN NOTE
67 yo F with pmhx of HTN being worked up by her nephrologist. 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    Patient has no symptoms of chronic mesenteric ischemia. No unintentional weightloss, no food fear, no post-prandial pain.    I discussed with her the findings and what the symptoms are CMI are. Since she has no symptoms I would not be worried about this finding and this can be monitored clincally. If she were to experience any of the above symptoms she should contact her PCP or our office so that we can further investigate. For now, no further studies and she can follow with me as needed

## 2024-09-09 NOTE — PROGRESS NOTES
Ambulatory Visit  Name: Reshma Gunn      : 1955      MRN: [id number]  Encounter Provider: Georges Deras MD  Encounter Date: 2024   Enc[facility] department: [facility] [place]    Assessment & Plan   1. Mesenteric artery stenosis (HCC)  Assessment & Plan:  69 yo F with pmhx of HTN being worked up by her nephrologist. 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    Patient has no symptoms of chronic mesenteric ischemia. No unintentional weightloss, no food fear, no post-prandial pain.    I discussed with her the findings and what the symptoms are CMI are. Since she has no symptoms I would not be worried about this finding and this can be monitored clincally. If she were to experience any of the above symptoms she should contact her PCP or our office so that we can further investigate. For now, no further studies and she can follow with me as needed  Orders:  -     Ambulatory Referral to Vascular Surgery      History of Present Illness     Reshma Gunn is a 68 y.o. female      Patient presents to review renal duplex. Patient is asymptomatic.     Review of Systems   Constitutional: Negative.    HENT: Negative.     Eyes: Negative.    Respiratory: Negative.     Cardiovascular: Negative.    Gastrointestinal: Negative.    Endocrine: Negative.    Genitourinary: Negative.    Musculoskeletal: Negative.    Skin: Negative.    Allergic/Immunologic: Negative.    Neurological: Negative.    Hematological: Negative.    Psychiatric/Behavioral: Negative.       Medical History Reviewed by provider this encounter:       Objective     /70 (BP Location: Left arm, Patient Position: Sitting, Cuff Size: Standard)   Pulse 74   Ht 5' (1.524 m)   Wt 64.9 kg (143 lb)   BMI 27.93 kg/m²     Physical Exam  Vitals and nursing note reviewed.   Constitutional:       General: She is not in acute  distress.     Appearance: She is well-developed.   HENT:      Head: Normocephalic and atraumatic.   Eyes:      Conjunctiva/sclera: Conjunctivae normal.   Cardiovascular:      Rate and Rhythm: Normal rate and regular rhythm.   Pulmonary:      Effort: Pulmonary effort is normal.      Breath sounds: Normal breath sounds.   Abdominal:      Palpations: Abdomen is soft.      Tenderness: There is no abdominal tenderness.   Musculoskeletal:      Cervical back: Neck supple.   Skin:     General: Skin is warm and dry.      Capillary Refill: Capillary refill takes less than 2 seconds.   Neurological:      Mental Status: She is alert.   Psychiatric:         Mood and Affect: Mood normal.       Administrative Statements   I have spent a total time of 45 minutes in caring for this patient on the day of the visit/encounter including Diagnostic results, Prognosis, Risks and benefits of tx options, Instructions for management, Patient and family education, Importance of tx compliance, Risk factor reductions, Impressions, Counseling / Coordination of care, Documenting in the medical record, Reviewing / ordering tests, medicine, procedures  , and Obtaining or reviewing history  .

## 2024-10-08 DIAGNOSIS — I10 HYPERTENSION, UNSPECIFIED TYPE: Primary | ICD-10-CM

## 2024-10-16 DIAGNOSIS — E83.42 HYPOMAGNESEMIA: ICD-10-CM

## 2024-10-16 DIAGNOSIS — N18.32 STAGE 3B CHRONIC KIDNEY DISEASE (HCC): ICD-10-CM

## 2024-10-16 RX ORDER — MAGNESIUM OXIDE 400 MG/1
1 TABLET ORAL DAILY
Qty: 30 TABLET | Refills: 0 | Status: SHIPPED | OUTPATIENT
Start: 2024-10-16

## 2024-10-16 NOTE — TELEPHONE ENCOUNTER
Patient needs updated blood work. Please place orders. A courtesy refill was provided.      Pt needs mag level

## 2024-10-17 ENCOUNTER — TELEPHONE (OUTPATIENT)
Age: 69
End: 2024-10-17

## 2024-10-17 ENCOUNTER — TELEPHONE (OUTPATIENT)
Dept: NEPHROLOGY | Facility: CLINIC | Age: 69
End: 2024-10-17

## 2024-10-17 DIAGNOSIS — N18.32 STAGE 3B CHRONIC KIDNEY DISEASE (HCC): Primary | ICD-10-CM

## 2024-10-17 NOTE — TELEPHONE ENCOUNTER
Tomás called into the office today to inquire if she is to repeat Magnesium lab work? She states she last had her Magnesium levels checked in May 2024. She states she requested a refill on Magnesium medication for Dr Colon to refill but it was sent to Dr Herbert. I tried telling her that it appeared that Dr Herbert has been prescribing her the Magnesium since January 2024. She states Dr Herbert was only willing to refill the medication for 30 days since she does not have recent Magnesium labs done. She states Dr Colon normally orders her the magnesium lab to check her levels.      Please advise.

## 2024-10-17 NOTE — TELEPHONE ENCOUNTER
I called and spoke with Tomás. She is aware to get the Magnesium done at her convenience. She states she will complete all labs prior to appointment on 11/14/2024. She states she would like Dr Colon to be the provider who refills her medications moving forward.

## 2024-10-17 NOTE — TELEPHONE ENCOUNTER
Patient will contact Dr. Colon to add the lab order to check magnesium levels as she has an appointment scheduled next month.  If Dr. Colon feels she needs to continue the magnesium tablets, she will ask her to order more for her - she had ordered it for her originally.  She's not sure why the pharmacist requested a refill of Dr. Herbert.

## 2024-11-05 ENCOUNTER — TELEPHONE (OUTPATIENT)
Age: 69
End: 2024-11-05

## 2024-11-05 ENCOUNTER — OFFICE VISIT (OUTPATIENT)
Dept: AUDIOLOGY | Facility: CLINIC | Age: 69
End: 2024-11-05

## 2024-11-05 DIAGNOSIS — H90.3 SENSORY HEARING LOSS, BILATERAL: Primary | ICD-10-CM

## 2024-11-05 NOTE — PROGRESS NOTES
Hearing Aid Visit:    Name:  Reshma Gunn  :  1955  Age:  68 y.o.  MRN:  [id number]  Date of Evaluation: 24     HISTORY:    Reshma Gunn was seen today (2024) for a(n) in-warranty hearing aid check of her bilateral hearing aids. Today, Reshma reported she feels her hearing aids aren't doing much at times.    DEVICE INFORMATION:     Left Device Right Device     Hearing Aid Make: [employer]  [employer]    Hearing Aid Model: Real 2 MiniRITE-R Real 2 MiniRITE-R   Serial Number: [id number]   Repair Warranty Date: 27   Loss/Damage Warranty Status:  Active  Active         Length/Output    Wax System: [employer] MiniFit [employer] MiniFit   Dome Size/Style: 8 OB 8OB   Battery: Lithium-Ion Rechargeable Lithium-Ion Rechargeable        [employer] Serial Number: N/A N/A   [employer] Warranty Date:  N/A N/A    Serial Number:  [id number]    Warranty Date:  27     Accessories: N/A       ACTION/ADJUSTMENTS:    Increased mid frequencies. Patient picked up supplies.     RECOMMENDATIONS:     Return Ivy Mccurdy, CCC-A  Clinical Audiologist  [facility] [address] [address]

## 2024-11-05 NOTE — TELEPHONE ENCOUNTER
I called and spoke with Tomás regarding completing lab work prior to her upcoming appt on 11/14/2024.

## 2024-11-09 ENCOUNTER — APPOINTMENT (OUTPATIENT)
Dept: LAB | Facility: HOSPITAL | Age: 69
End: 2024-11-09
Attending: INTERNAL MEDICINE
Payer: MEDICARE

## 2024-11-09 DIAGNOSIS — I34.1 MITRAL VALVE PROLAPSE: ICD-10-CM

## 2024-11-09 DIAGNOSIS — R76.8 ANA POSITIVE: ICD-10-CM

## 2024-11-09 DIAGNOSIS — R06.83 SNORING: ICD-10-CM

## 2024-11-09 DIAGNOSIS — I10 HYPERTENSION, UNSPECIFIED TYPE: ICD-10-CM

## 2024-11-09 DIAGNOSIS — N18.32 STAGE 3B CHRONIC KIDNEY DISEASE (HCC): ICD-10-CM

## 2024-11-09 LAB
ALBUMIN SERPL BCG-MCNC: 4.4 G/DL (ref 3.5–5)
ALP SERPL-CCNC: 69 U/L (ref 34–104)
ALT SERPL W P-5'-P-CCNC: 24 U/L (ref 7–52)
ANION GAP SERPL CALCULATED.3IONS-SCNC: 9 MMOL/L (ref 4–13)
AST SERPL W P-5'-P-CCNC: 26 U/L (ref 13–39)
BACTERIA UR QL AUTO: ABNORMAL /HPF
BILIRUB SERPL-MCNC: 0.81 MG/DL (ref 0.2–1)
BILIRUB UR QL STRIP: NEGATIVE
BUN SERPL-MCNC: 14 MG/DL (ref 5–25)
CALCIUM SERPL-MCNC: 9.4 MG/DL (ref 8.4–10.2)
CHLORIDE SERPL-SCNC: 102 MMOL/L (ref 96–108)
CLARITY UR: CLEAR
CO2 SERPL-SCNC: 29 MMOL/L (ref 21–32)
COLOR UR: ABNORMAL
CREAT SERPL-MCNC: 0.96 MG/DL (ref 0.6–1.3)
CREAT UR-MCNC: 56.4 MG/DL
CREAT UR-MCNC: 58.3 MG/DL
GFR SERPL CREATININE-BSD FRML MDRD: 61 ML/MIN/1.73SQ M
GLUCOSE P FAST SERPL-MCNC: 92 MG/DL (ref 65–99)
GLUCOSE UR STRIP-MCNC: NEGATIVE MG/DL
HGB UR QL STRIP.AUTO: NEGATIVE
KETONES UR STRIP-MCNC: NEGATIVE MG/DL
LEUKOCYTE ESTERASE UR QL STRIP: ABNORMAL
MAGNESIUM SERPL-MCNC: 1.8 MG/DL (ref 1.9–2.7)
MICROALBUMIN UR-MCNC: <7 MG/L
NITRITE UR QL STRIP: NEGATIVE
NON-SQ EPI CELLS URNS QL MICRO: ABNORMAL /HPF
PH UR STRIP.AUTO: 6.5 [PH]
POTASSIUM SERPL-SCNC: 3.3 MMOL/L (ref 3.5–5.3)
PROT SERPL-MCNC: 6.6 G/DL (ref 6.4–8.4)
PROT UR STRIP-MCNC: NEGATIVE MG/DL
PROT UR-MCNC: 4.7 MG/DL
PROT/CREAT UR: 0.1 MG/G{CREAT} (ref 0–0.1)
PTH-INTACT SERPL-MCNC: 54.9 PG/ML (ref 12–88)
RBC #/AREA URNS AUTO: ABNORMAL /HPF
SODIUM SERPL-SCNC: 140 MMOL/L (ref 135–147)
SP GR UR STRIP.AUTO: 1.01 (ref 1–1.03)
UROBILINOGEN UR STRIP-ACNC: <2 MG/DL
WBC #/AREA URNS AUTO: ABNORMAL /HPF

## 2024-11-09 PROCEDURE — 84156 ASSAY OF PROTEIN URINE: CPT

## 2024-11-09 PROCEDURE — 81001 URINALYSIS AUTO W/SCOPE: CPT

## 2024-11-09 PROCEDURE — 82570 ASSAY OF URINE CREATININE: CPT

## 2024-11-09 PROCEDURE — 83735 ASSAY OF MAGNESIUM: CPT

## 2024-11-09 PROCEDURE — 83970 ASSAY OF PARATHORMONE: CPT

## 2024-11-09 PROCEDURE — 80053 COMPREHEN METABOLIC PANEL: CPT

## 2024-11-09 PROCEDURE — 36415 COLL VENOUS BLD VENIPUNCTURE: CPT

## 2024-11-09 PROCEDURE — 82043 UR ALBUMIN QUANTITATIVE: CPT

## 2024-11-11 ENCOUNTER — TELEPHONE (OUTPATIENT)
Age: 69
End: 2024-11-11

## 2024-11-11 NOTE — TELEPHONE ENCOUNTER
I called and spoke with Tomás regarding the following:    ----- Message from Kisha Colon DO sent at 11/11/2024  9:52 AM EST -----  Please call patient, potassium mildly low. Encourage potassium intake like potatoes, tomatoes,bananas, citrus fruits. Remainder of labs will be discussed at upcoming visit this week       She is aware of results. She had no questions or concerns.

## 2024-11-14 ENCOUNTER — OFFICE VISIT (OUTPATIENT)
Dept: NEPHROLOGY | Facility: CLINIC | Age: 69
End: 2024-11-14
Payer: MEDICARE

## 2024-11-14 VITALS
TEMPERATURE: 98 F | HEIGHT: 60 IN | OXYGEN SATURATION: 98 % | SYSTOLIC BLOOD PRESSURE: 122 MMHG | HEART RATE: 65 BPM | WEIGHT: 142.6 LBS | DIASTOLIC BLOOD PRESSURE: 74 MMHG | BODY MASS INDEX: 28 KG/M2

## 2024-11-14 DIAGNOSIS — E83.42 HYPOMAGNESEMIA: ICD-10-CM

## 2024-11-14 DIAGNOSIS — I10 ESSENTIAL HYPERTENSION: ICD-10-CM

## 2024-11-14 DIAGNOSIS — N18.31 STAGE 3A CHRONIC KIDNEY DISEASE (HCC): Primary | ICD-10-CM

## 2024-11-14 DIAGNOSIS — E78.2 MIXED HYPERLIPIDEMIA: ICD-10-CM

## 2024-11-14 PROCEDURE — 99214 OFFICE O/P EST MOD 30 MIN: CPT | Performed by: INTERNAL MEDICINE

## 2024-11-14 PROCEDURE — G2211 COMPLEX E/M VISIT ADD ON: HCPCS | Performed by: INTERNAL MEDICINE

## 2024-11-14 RX ORDER — MAGNESIUM OXIDE 400 MG/1
1 TABLET ORAL DAILY
Qty: 90 TABLET | Refills: 1 | Status: SHIPPED | OUTPATIENT
Start: 2024-11-14

## 2024-11-14 NOTE — PROGRESS NOTES
Assessment & Plan:    1. Stage 3a chronic kidney disease (HCC)  -     magnesium oxide (MAG-OX) 400 mg tablet; Take 1 tablet (400 mg total) by mouth daily  -     Albumin / creatinine urine ratio; Future; Expected date: 05/21/2025  -     CBC and differential; Future; Expected date: 05/21/2025  -     Comprehensive metabolic panel; Future; Expected date: 05/21/2025  -     Magnesium; Future; Expected date: 05/21/2025  -     PTH, intact; Future; Expected date: 05/21/2025  -     Urinalysis with microscopic; Future; Expected date: 05/21/2025  2. Hypomagnesemia  -     magnesium oxide (MAG-OX) 400 mg tablet; Take 1 tablet (400 mg total) by mouth daily  -     Albumin / creatinine urine ratio; Future; Expected date: 05/21/2025  -     CBC and differential; Future; Expected date: 05/21/2025  -     Comprehensive metabolic panel; Future; Expected date: 05/21/2025  -     Magnesium; Future; Expected date: 05/21/2025  -     PTH, intact; Future; Expected date: 05/21/2025  -     Urinalysis with microscopic; Future; Expected date: 05/21/2025  3. Essential hypertension  4. Mixed hyperlipidemia           CKD3  Cr trend in 1.1-1.2 range. 11/9/24 Cr 0.96 with eGFR 61 ml/min.  Etiology 2/2 HTN nephrosclerosis.   Reviewed CKD stages, creatinine and EGFR trends.  Volume status and metabolic parameters stable aside from mild hypokalemia. K 3.3 due to HCTZ. Encourage supplementation.   No evidence of albuminuria.    No changes to current regimen.  Hyperaldosteronism workup negative.  Negative renal artery stenosis workup.    Follow up in 6 months with labs prior.  Avoid routine NSAID use.  Encourage hydration to 2 quart per day.    2. Hypomagnesemia, mild  Mag 1.8.  Continue magnesium oxide 400mg/day, tolerating well. Refill provided.    3. Essential HTN  BP well controlled, reviewed trend--110-120/70.   Continue current regimen-amlodipine 5mg/day, HCTZ 25mg/day, Losartan 50mg/day and metoprolol succinate 50mg/day.  Volume status compensated, no  edema.   7/1/24 Echo EF 61 to 64%.  7/10/24 vascular duplex negative for ROCHELLE.  6/14/23 Negative hyperaldo workup, ratio 1.3. anita 3.9, renin 3.0.    4. Mixed HLD  Continue Crestor 10mg/day.    5. Health maintenance  Continue age appropriate cancer screening and vaccine per primary.    Patient will require ongoing complex care for CKD management.       The benefits, risks and alternatives to the treatment plan were discussed at this visit. Patient was advised of common adverse effects of any medical therapies prescribed. All questions were answered and discussed with the patient and any accompanying family members or caretakers.      Subjective:      Patient ID: Reshma Gunn is a 69 y.o. female presenting for follow up in the [place] office for HTN/CKD management. Patient last seen 5/14/24. She had secondary HTN workup with vascular duplex negative for ROCHELLE. Additionally, hormonal workup for hyperaldo  unrevealing.     HPI  Patient has hx HLD, CKD3a, hypokalemia, HTN.   In the interim since last visit, reports COVID in October. She had flu like symptoms which has improved.    She had Echo and stress test performed in July due to palpitation concerns. EF normal on echo and echo stress negative for ischemia.    -120/ 70s. Current medication-- Amlodipine 5mg/day, HCTZ 25mg/day, Losartan 50mg/day and Metoprolol succinate 50mg/day.    She is hydrating better. She has metabmucil. She drinks 64 oucces of fluid a day.  Denies swelling since amlodipine reduction in dose.    She had cramps in legs which has improved with daily supplementation, Mag 1.8.     She goes to sleep apnea provider in upcoming months.    The following portions of the patient's history were reviewed and updated as appropriate: allergies, current medications, past family history, past medical history, past social history, past surgical history, and problem list.    Review of Systems   Respiratory: Negative.     Cardiovascular: Negative.     Genitourinary: Negative.    Neurological: Negative.    All other systems reviewed and are negative.        Objective:      /74 (BP Location: Right arm, Patient Position: Sitting, Cuff Size: Standard)   Pulse 65   Temp 98 °F (36.7 °C) (Temporal)   Ht 5' (1.524 m)   Wt 64.7 kg (142 lb 9.6 oz)   SpO2 98%   BMI 27.85 kg/m²          Physical Exam  Vitals reviewed.   Constitutional:       General: She is not in acute distress.     Appearance: She is not ill-appearing.   HENT:      Head: Normocephalic and atraumatic.      Nose: Nose normal.   Eyes:      Extraocular Movements: Extraocular movements intact.      Conjunctiva/sclera: Conjunctivae normal.   Cardiovascular:      Rate and Rhythm: Normal rate and regular rhythm.      Heart sounds:      No friction rub.   Pulmonary:      Breath sounds: No wheezing, rhonchi or rales.   Abdominal:      Tenderness: There is no abdominal tenderness. There is no guarding.   Musculoskeletal:      Right lower leg: No edema.      Left lower leg: No edema.   Skin:     Findings: No bruising or lesion.   Neurological:      General: No focal deficit present.      Mental Status: She is alert and oriented to person, place, and time.      Cranial Nerves: No cranial nerve deficit.      Motor: No weakness.             Lab Results   Component Value Date     09/16/2015    SODIUM 140 11/09/2024    K 3.3 (L) 11/09/2024     11/09/2024    CO2 29 11/09/2024    ANIONGAP 4 09/16/2015    AGAP 9 11/09/2024    BUN 14 11/09/2024    CREATININE 0.96 11/09/2024    GLUC 104 (H) 06/14/2024    GLUF 92 11/09/2024    CALCIUM 9.4 11/09/2024    AST 26 11/09/2024    ALT 24 11/09/2024    ALKPHOS 69 11/09/2024    PROT 6.8 09/16/2015    TP 6.6 11/09/2024    BILITOT 0.64 09/16/2015    TBILI 0.81 11/09/2024    EGFR 61 11/09/2024      Lab Results   Component Value Date    CREATININE 0.96 11/09/2024    CREATININE 1.16 07/06/2024    CREATININE 1.21 (H) 06/14/2024    CREATININE 1.13 05/06/2024     "CREATININE 1.27 01/04/2024    CREATININE 1.28 10/18/2023    CREATININE 1.40 (H) 09/25/2023    CREATININE 1.20 09/09/2023    CREATININE 1.25 06/14/2023    CREATININE 1.49 (H) 04/01/2023    CREATININE 1.11 09/17/2022    CREATININE 0.99 03/19/2022    CREATININE 0.98 09/16/2021    CREATININE 0.89 12/18/2020    CREATININE 0.73 08/04/2016      Lab Results   Component Value Date    COLORU Light Yellow 11/09/2024    CLARITYU Clear 11/09/2024    SPECGRAV 1.010 11/09/2024    PHUR 6.5 11/09/2024    LEUKOCYTESUR Trace (A) 11/09/2024    NITRITE Negative 11/09/2024    PROTEIN UA Negative 11/09/2024    GLUCOSEU Negative 11/09/2024    KETONESU Negative 11/09/2024    UROBILINOGEN <2.0 11/09/2024    BILIRUBINUR Negative 11/09/2024    BLOODU Negative 11/09/2024    RBCUA 0-1 (A) 11/09/2024    WBCUA None Seen 11/09/2024    EPIS None Seen 11/09/2024    BACTERIA None Seen 11/09/2024      No results found for: \"LABPROT\"  No results found for: \"MICROALBUR\", \"RVLG06MHJ\"  Lab Results   Component Value Date    WBC 6.99 01/04/2024    HGB 12.8 01/04/2024    HCT 39.1 01/04/2024    MCV 94 01/04/2024     01/04/2024      Lab Results   Component Value Date    HGB 12.8 01/04/2024    HGB 11.4 (L) 04/13/2023    HGB 11.2 (L) 04/01/2023    HGB 13.2 12/18/2020    HGB 13.6 08/04/2016      Lab Results   Component Value Date    IRON 96 04/13/2023    TIBC 285 04/13/2023    FERRITIN 110 04/13/2023      No results found for: \"PTHCALCIUM\", \"[id number]\", \"PHOSPHORUS\"   Lab Results   Component Value Date    CHOLESTEROL 159 07/06/2024    HDL 50 07/06/2024    LDLCALC 84 07/06/2024    TRIG 123 07/06/2024      Lab Results   Component Value Date    URICACID 5.0 05/06/2024      No results found for: \"HGBA1C\"   No results found for: \"TSHANTIBODY\", \"J9GALQH\", \"FREET4\"   Lab Results   Component Value Date    JUAN MIGUEL Positive (A) 06/14/2023      Lab Results   Component Value Date    PROT 6.8 09/16/2015    UPEP See Comment 06/14/2023        Portions of the record may " "have been created with voice recognition software. Occasional wrong word or \"sound a like\" substitutions may have occurred due to the inherent limitations of voice recognition software. Read the chart carefully and recognize, using context, where substitutions have occurred. If you have any questions, please contact the dictating provider.      "

## 2025-01-02 ENCOUNTER — RA CDI HCC (OUTPATIENT)
Dept: OTHER | Facility: HOSPITAL | Age: 70
End: 2025-01-02

## 2025-01-02 DIAGNOSIS — I10 ESSENTIAL HYPERTENSION, BENIGN: ICD-10-CM

## 2025-01-03 ENCOUNTER — APPOINTMENT (OUTPATIENT)
Dept: LAB | Facility: HOSPITAL | Age: 70
End: 2025-01-03
Payer: MEDICARE

## 2025-01-03 ENCOUNTER — TELEPHONE (OUTPATIENT)
Dept: FAMILY MEDICINE CLINIC | Facility: CLINIC | Age: 70
End: 2025-01-03

## 2025-01-03 DIAGNOSIS — E78.2 MIXED HYPERLIPIDEMIA: ICD-10-CM

## 2025-01-03 DIAGNOSIS — R53.83 OTHER FATIGUE: ICD-10-CM

## 2025-01-03 DIAGNOSIS — E87.6 HYPOKALEMIA: ICD-10-CM

## 2025-01-03 LAB
ALBUMIN SERPL BCG-MCNC: 4.7 G/DL (ref 3.5–5)
ALP SERPL-CCNC: 69 U/L (ref 34–104)
ALT SERPL W P-5'-P-CCNC: 27 U/L (ref 7–52)
ANION GAP SERPL CALCULATED.3IONS-SCNC: 9 MMOL/L (ref 4–13)
AST SERPL W P-5'-P-CCNC: 27 U/L (ref 13–39)
BASOPHILS # BLD AUTO: 0.05 THOUSANDS/ΜL (ref 0–0.1)
BASOPHILS NFR BLD AUTO: 1 % (ref 0–1)
BILIRUB SERPL-MCNC: 0.84 MG/DL (ref 0.2–1)
BUN SERPL-MCNC: 24 MG/DL (ref 5–25)
CALCIUM SERPL-MCNC: 9.9 MG/DL (ref 8.4–10.2)
CHLORIDE SERPL-SCNC: 99 MMOL/L (ref 96–108)
CO2 SERPL-SCNC: 30 MMOL/L (ref 21–32)
CREAT SERPL-MCNC: 0.97 MG/DL (ref 0.6–1.3)
EOSINOPHIL # BLD AUTO: 0.16 THOUSAND/ΜL (ref 0–0.61)
EOSINOPHIL NFR BLD AUTO: 2 % (ref 0–6)
ERYTHROCYTE [DISTWIDTH] IN BLOOD BY AUTOMATED COUNT: 12.2 % (ref 11.6–15.1)
GFR SERPL CREATININE-BSD FRML MDRD: 60 ML/MIN/1.73SQ M
GLUCOSE P FAST SERPL-MCNC: 90 MG/DL (ref 65–99)
HCT VFR BLD AUTO: 42.1 % (ref 34.8–46.1)
HGB BLD-MCNC: 13.8 G/DL (ref 11.5–15.4)
IMM GRANULOCYTES # BLD AUTO: 0.01 THOUSAND/UL (ref 0–0.2)
IMM GRANULOCYTES NFR BLD AUTO: 0 % (ref 0–2)
LDLC SERPL DIRECT ASSAY-MCNC: 98 MG/DL (ref 0–100)
LYMPHOCYTES # BLD AUTO: 2 THOUSANDS/ΜL (ref 0.6–4.47)
LYMPHOCYTES NFR BLD AUTO: 29 % (ref 14–44)
MCH RBC QN AUTO: 30.5 PG (ref 26.8–34.3)
MCHC RBC AUTO-ENTMCNC: 32.8 G/DL (ref 31.4–37.4)
MCV RBC AUTO: 93 FL (ref 82–98)
MONOCYTES # BLD AUTO: 0.6 THOUSAND/ΜL (ref 0.17–1.22)
MONOCYTES NFR BLD AUTO: 9 % (ref 4–12)
NEUTROPHILS # BLD AUTO: 4.04 THOUSANDS/ΜL (ref 1.85–7.62)
NEUTS SEG NFR BLD AUTO: 59 % (ref 43–75)
NRBC BLD AUTO-RTO: 0 /100 WBCS
PLATELET # BLD AUTO: 247 THOUSANDS/UL (ref 149–390)
PMV BLD AUTO: 9.7 FL (ref 8.9–12.7)
POTASSIUM SERPL-SCNC: 3.4 MMOL/L (ref 3.5–5.3)
PROT SERPL-MCNC: 7.1 G/DL (ref 6.4–8.4)
RBC # BLD AUTO: 4.52 MILLION/UL (ref 3.81–5.12)
SODIUM SERPL-SCNC: 138 MMOL/L (ref 135–147)
TSH SERPL DL<=0.05 MIU/L-ACNC: 1.47 UIU/ML (ref 0.45–4.5)
WBC # BLD AUTO: 6.86 THOUSAND/UL (ref 4.31–10.16)

## 2025-01-03 PROCEDURE — 83721 ASSAY OF BLOOD LIPOPROTEIN: CPT

## 2025-01-03 PROCEDURE — 84443 ASSAY THYROID STIM HORMONE: CPT

## 2025-01-03 PROCEDURE — 85025 COMPLETE CBC W/AUTO DIFF WBC: CPT

## 2025-01-03 PROCEDURE — 80053 COMPREHEN METABOLIC PANEL: CPT

## 2025-01-03 PROCEDURE — 36415 COLL VENOUS BLD VENIPUNCTURE: CPT

## 2025-01-03 RX ORDER — METOPROLOL SUCCINATE 50 MG/1
50 TABLET, EXTENDED RELEASE ORAL DAILY
Qty: 90 TABLET | Refills: 3 | Status: SHIPPED | OUTPATIENT
Start: 2025-01-03

## 2025-01-03 NOTE — TELEPHONE ENCOUNTER
Left a message for patient to call the office to change her Jan 9 appt due to the fact the doctor will be at a meeting at that time.

## 2025-01-04 DIAGNOSIS — N18.32 STAGE 3B CHRONIC KIDNEY DISEASE (HCC): ICD-10-CM

## 2025-01-06 RX ORDER — AMLODIPINE BESYLATE 5 MG/1
5 TABLET ORAL DAILY
Qty: 90 TABLET | Refills: 1 | Status: SHIPPED | OUTPATIENT
Start: 2025-01-06

## 2025-01-10 ENCOUNTER — OFFICE VISIT (OUTPATIENT)
Dept: FAMILY MEDICINE CLINIC | Facility: CLINIC | Age: 70
End: 2025-01-10
Payer: MEDICARE

## 2025-01-10 DIAGNOSIS — I10 ESSENTIAL HYPERTENSION, BENIGN: Primary | ICD-10-CM

## 2025-01-10 DIAGNOSIS — E78.2 MIXED HYPERLIPIDEMIA: ICD-10-CM

## 2025-01-10 DIAGNOSIS — E87.6 HYPOKALEMIA: ICD-10-CM

## 2025-01-10 DIAGNOSIS — K55.1 MESENTERIC ARTERY STENOSIS (HCC): ICD-10-CM

## 2025-01-10 DIAGNOSIS — R09.89 BRUIT OF RIGHT CAROTID ARTERY: ICD-10-CM

## 2025-01-10 PROBLEM — N18.31 STAGE 3A CHRONIC KIDNEY DISEASE (HCC): Status: RESOLVED | Noted: 2023-04-03 | Resolved: 2025-01-10

## 2025-01-10 PROCEDURE — 99214 OFFICE O/P EST MOD 30 MIN: CPT | Performed by: FAMILY MEDICINE

## 2025-01-10 NOTE — PROGRESS NOTES
Name: Reshma Gunn      : 1955      MRN: [id number]  Encounter Provider: Yamil Herbert DO  Encounter Date: 1/10/2025   Encounter department: [facility]  :  Assessment & Plan  Essential hypertension, benign  Patient has hypertension.  Blood pressure is controlled at home.  Blood pressure today was 111/68 at home.  In the office, blood pressure was again noted to be quite high.  Patient is going to continue amlodipine, hydrochlorothiazide, and metoprolol.  I encouraged the patient to follow a low-sodium diet and to continue to exercise.         Hypokalemia  Patient's labs were reviewed.  Her potassium was 3.4.  I note that the patient's potassium has been consistently low.  I recommend we start her on a potassium supplement.  She tells me that her nephrologist had started her on a potassium supplement but she refuses to take it.  The patient tells me she is trying to eat foods that are high in potassium.  She refused a prescription for a potassium supplement.  We will need to continue to monitor the patient's potassium.         Mixed hyperlipidemia  The patient's direct LDL cholesterol was 98.  Her goal is less than 100.  She will continue rosuvastatin 10 mg daily.         Bruit of right carotid artery  Patient complains of hearing her heartbeat in her right ear.  As such, I am going to check carotid Dopplers to rule out carotid artery stenosis.  Orders:    VAS carotid complete study; Future    Mesenteric artery stenosis (HCC)                History of Present Illness     This patient is a 69-year-old white female who presents to the office today for her routine checkup.  The patient is doing well.  She tells me that she checks her blood pressures regularly at home and she finds them to be well-controlled.  Her blood pressure today at home was 111/68.  She tells me when she comes to the office she feels her face getting red and hot she does have blood pressures going up.  She tells  me she is exercising regularly.  She does aerobics and she also walks.  Her main complaint today is that she hears her heartbeat in her right ear when she is laying down.      Review of Systems   Constitutional:  Negative for activity change and appetite change.   Cardiovascular:  Negative for chest pain, palpitations and leg swelling.   Gastrointestinal:  Negative for abdominal distention, abdominal pain, blood in stool, constipation, diarrhea and nausea.   Musculoskeletal:  Positive for arthralgias.        Denies leg cramps   Psychiatric/Behavioral:  The patient is nervous/anxious.        Objective   /68 Comment: Home  Pulse 62   Temp (!) 97.1 °F (36.2 °C) (Tympanic)   Ht 5' (1.524 m)   Wt 65.5 kg (144 lb 4.8 oz)   SpO2 98%   BMI 28.18 kg/m²      Physical Exam  Vitals reviewed.   Constitutional:       Comments: The patient is a 69-year-old white female who appears her stated age.  The patient is nonseptic in appearance and in no apparent distress   HENT:      Head: Normocephalic and atraumatic.      Right Ear: Tympanic membrane, ear canal and external ear normal. There is no impacted cerumen.      Left Ear: Tympanic membrane, ear canal and external ear normal. There is no impacted cerumen.      Mouth/Throat:      Mouth: Mucous membranes are moist.      Pharynx: Oropharynx is clear. No oropharyngeal exudate or posterior oropharyngeal erythema.   Eyes:      General: No scleral icterus.        Right eye: No discharge.         Left eye: No discharge.      Conjunctiva/sclera: Conjunctivae normal.      Pupils: Pupils are equal, round, and reactive to light.   Neck:      Vascular: No carotid bruit.      Comments: There is no thyromegaly  Cardiovascular:      Rate and Rhythm: Normal rate and regular rhythm.      Heart sounds: Normal heart sounds. No murmur heard.     No friction rub. No gallop.   Pulmonary:      Effort: Pulmonary effort is normal. No respiratory distress.      Breath sounds: Normal breath  sounds. No stridor. No wheezing, rhonchi or rales.   Abdominal:      General: Bowel sounds are normal. There is no distension.      Palpations: Abdomen is soft. There is no mass.      Tenderness: There is no abdominal tenderness. There is no guarding.      Comments: No organomegaly   Musculoskeletal:      Cervical back: Neck supple.   Lymphadenopathy:      Cervical: No cervical adenopathy.   Psychiatric:         Mood and Affect: Mood normal.         Behavior: Behavior normal.         Thought Content: Thought content normal.         Judgment: Judgment normal.      Comments: Anxious appearing 69-year-old white female

## 2025-01-11 DIAGNOSIS — N18.32 STAGE 3B CHRONIC KIDNEY DISEASE (HCC): ICD-10-CM

## 2025-01-13 RX ORDER — HYDROCHLOROTHIAZIDE 25 MG/1
25 TABLET ORAL DAILY
Qty: 90 TABLET | Refills: 1 | Status: SHIPPED | OUTPATIENT
Start: 2025-01-13

## 2025-01-16 VITALS
WEIGHT: 144.3 LBS | BODY MASS INDEX: 28.33 KG/M2 | TEMPERATURE: 97.1 F | OXYGEN SATURATION: 98 % | HEIGHT: 60 IN | DIASTOLIC BLOOD PRESSURE: 68 MMHG | HEART RATE: 62 BPM | SYSTOLIC BLOOD PRESSURE: 111 MMHG

## 2025-01-16 PROBLEM — R09.89 BRUIT OF RIGHT CAROTID ARTERY: Status: ACTIVE | Noted: 2025-01-16

## 2025-01-16 NOTE — ASSESSMENT & PLAN NOTE
The patient's direct LDL cholesterol was 98.  Her goal is less than 100.  She will continue rosuvastatin 10 mg daily.

## 2025-01-16 NOTE — ASSESSMENT & PLAN NOTE
Patient complains of hearing her heartbeat in her right ear.  As such, I am going to check carotid Dopplers to rule out carotid artery stenosis.  Orders:    VAS carotid complete study; Future

## 2025-01-16 NOTE — ASSESSMENT & PLAN NOTE
Patient has hypertension.  Blood pressure is controlled at home.  Blood pressure today was 111/68 at home.  In the office, blood pressure was again noted to be quite high.  Patient is going to continue amlodipine, hydrochlorothiazide, and metoprolol.  I encouraged the patient to follow a low-sodium diet and to continue to exercise.

## 2025-01-16 NOTE — ASSESSMENT & PLAN NOTE
Patient's labs were reviewed.  Her potassium was 3.4.  I note that the patient's potassium has been consistently low.  I recommend we start her on a potassium supplement.  She tells me that her nephrologist had started her on a potassium supplement but she refuses to take it.  The patient tells me she is trying to eat foods that are high in potassium.  She refused a prescription for a potassium supplement.  We will need to continue to monitor the patient's potassium.

## 2025-01-28 ENCOUNTER — HOSPITAL ENCOUNTER (OUTPATIENT)
Dept: NON INVASIVE DIAGNOSTICS | Facility: HOSPITAL | Age: 70
Discharge: HOME | End: 2025-01-28
Payer: MEDICARE

## 2025-01-28 DIAGNOSIS — R09.89 BRUIT OF RIGHT CAROTID ARTERY: ICD-10-CM

## 2025-01-28 PROCEDURE — 93880 EXTRACRANIAL BILAT STUDY: CPT | Performed by: SURGERY

## 2025-01-28 PROCEDURE — 93880 EXTRACRANIAL BILAT STUDY: CPT

## 2025-02-03 ENCOUNTER — RESULTS FOLLOW-UP (OUTPATIENT)
Dept: FAMILY MEDICINE CLINIC | Facility: CLINIC | Age: 70
End: 2025-02-03

## 2025-02-03 NOTE — TELEPHONE ENCOUNTER
----- Message from Yamil Herbert DO sent at 2/3/2025  9:37 AM EST -----  Carotid dopplers are good. No blockages are noted.

## 2025-02-10 ENCOUNTER — OFFICE VISIT (OUTPATIENT)
Dept: SLEEP CENTER | Facility: CLINIC | Age: 70
End: 2025-02-10
Payer: MEDICARE

## 2025-02-10 VITALS
TEMPERATURE: 97.3 F | OXYGEN SATURATION: 99 % | DIASTOLIC BLOOD PRESSURE: 72 MMHG | BODY MASS INDEX: 29.06 KG/M2 | RESPIRATION RATE: 16 BRPM | HEART RATE: 69 BPM | WEIGHT: 148 LBS | HEIGHT: 60 IN | SYSTOLIC BLOOD PRESSURE: 120 MMHG

## 2025-02-10 DIAGNOSIS — G47.33 OSA (OBSTRUCTIVE SLEEP APNEA): Primary | ICD-10-CM

## 2025-02-10 DIAGNOSIS — I10 ESSENTIAL HYPERTENSION, BENIGN: ICD-10-CM

## 2025-02-10 PROCEDURE — 99204 OFFICE O/P NEW MOD 45 MIN: CPT | Performed by: NURSE PRACTITIONER

## 2025-02-10 NOTE — PROGRESS NOTES
Name: Reshma Gunn      : 1955      MRN: [id number]  Encounter Provider: TITO Overton  Encounter Date: 2/10/2025   Encounter department: [facility] SLEEP MEDICINE [place]  :  Assessment & Plan  DEVI (obstructive sleep apnea)  I reviewed the recent test results with the patient.  We talked about the abnormal findings, including those consistent with Obstructive Sleep Apnea. We then discussed how the these are categorized as mild, moderate or severe.  We also covered the medical comorbidities associated with untreated Obstructive Sleep Apnea including, hypertension, cardiac arrythmia, myocardial infarction and stroke.  I explained how the risk of these conditions increases with the severity of the test results.  I also spoke in some detail about the most common treatment options including weight loss, nasal PAP, mandibular advancement devices and uvulopalatopharyngoplasty (UPPP).  I answered all questions posed to me and gave my opinion regarding the best options for treatment based on the patient and their level of disease.  She declines trial of APAP. She does not feel she is symptomatic and does not feel she could tolerate use of CPAP equipment.    We discussed that events were noted when she was sleeping in a supine position.  She does not feel she typically sleeps in a supine position and feels that it was due to having the sleep study equipment intact.  She was advised to consider a positional device to completely avoid supine sleep.  She plans to consider looking into devices such as Zzoma, a sleep noodle or other online.    Healthy diet and exercise were also discussed and encouraged.         Essential hypertension, benign  Hypertension - Blood pressure was well controlled today.  We reviewed the association between untreated obstructive sleep apnea and the increased risk for hypertension. Patient to continue on prescribed anti-hypertensive therapy (amlodipine, cozaar, metoprolol) and  follow up with PCP for continuity of care.              History of Present Illness   Patient is a 69 year old female with PMHx of being overweight with HTN treated with 3 classes of medication.  She has also had some intermittent heart palpitations.  She reports that her blood pressure was elevated and discussed causes with her PCP.  A home sleep study was completed and mild DEVI was confirmed with MOO of 10.1 with oxygen annabelle of 86%.  She has a history of benign positional vertigo and has had PT to treat.      Patient accompanied at this vist by :       Sitting and reading: Slight chance of dozing  Watching TV: Slight chance of dozing  Sitting, inactive in a public place (e.g. a theatre or a meeting): Would never doze  As a passenger in a car for an hour without a break: Would never doze  Lying down to rest in the afternoon when circumstances permit: Slight chance of dozing  Sitting and talking to someone: Would never doze  Sitting quietly after a lunch without alcohol: Would never doze  In a car, while stopped for a few minutes in traffic: Would never doze  Total score: 3     Review of Systems  Pertinent positives/negatives included in HPI and also as noted: none      Prior Sleep Studies:  She completed a home sleep study in June 2024.  Testing confirmed mild DEVI with MOO of 10.1 with oxygen annabelle of 86%.      Past Treatments:  none      Sleep-Wake Schedule:  She is self-described as a morning person.  Bedtime: 11:00pm, takes a few minutes to 30 minutes  Wake Time: 6:00am, without the use of an alarm  Difficulty Falling Asleep: No  Avg Number of Awakenings: 1-2 times per night  Cause of Awakenings: for urination  Weekend Sleep Schedule: same  Naps: she does not take naps    If She does take a nap, naps are refreshing in quality    Avg TST per 24 hours: 6.5-7 hours    Sleep-Related Details:  Preferred Sleep Position: side(s)  SDB Symptoms: snoring  Bruxism: No  Nocturnal GERD: No  Nocturnal Palpitations:  No  Nocturnal Anxiety or Rumination: No  Sleep-Related Hallucinations: No   Sleep Paralysis: No   No symptoms consistent with restless legs syndrome      She moans in her sleep at times.    Wake-Related Details  Daytime Sleepiness: Yes, very mild - Viola 3/24    Drowsy Driving: No  Employment:  currently retired.  She previously worked in a [employer] between the hours of 8:00am-2:00pm.    CDL license:  No    Caffeine:  none    Tobacco:   reports that she has never smoked. She has never been exposed to tobacco smoke. She has never used smokeless tobacco.  E-cig/Vaping:    E-Cigarette/Vaping    E-Cigarette Use Never User       E-Cigarette/Vaping Substances    Nicotine No     THC No     CBD No     Flavoring No     Other No     Unknown No       Alcohol:   reports that she does not currently use alcohol. none   Drugs:   reports no history of drug use.      Weight Change:   Weight fluctuates by 5 lbs throughout the year    She does not have difficulty with memory or concentration.         Family History of Sleep Disorders: father snored, but was not tested        Current Outpatient Medications on File Prior to Visit   Medication Sig Dispense Refill    amLODIPine (NORVASC) 5 mg tablet TAKE 1 TABLET (5 MG TOTAL) BY MOUTH DAILY. 90 tablet 1    ascorbic acid (VITAMIN C) 250 MG tablet Take 500 mg by mouth 2 (two) times a day      Calcium Citrate-Vitamin D3 315-250 MG-UNIT TABS Take 1 tablet by mouth daily      hydroCHLOROthiazide 25 mg tablet TAKE 1 TABLET (25 MG TOTAL) BY MOUTH DAILY. 90 tablet 1    ipratropium (ATROVENT) 0.03 % nasal spray SPRAY 2 SPRAYS INTO EACH NOSTRIL 3 TIMES A DAY 90 mL 2    LORazepam (ATIVAN) 0.5 mg tablet Take 1 tablet (0.5 mg total) by mouth every 8 (eight) hours as needed for anxiety 150 tablet 1    losartan (COZAAR) 50 mg tablet TAKE 1 TABLET BY MOUTH EVERY DAY 90 tablet 1    magnesium oxide (MAG-OX) 400 mg tablet Take 1 tablet (400 mg total) by mouth daily 90 tablet 1    metoprolol succinate  (TOPROL-XL) 50 mg 24 hr tablet TAKE 1 TABLET BY MOUTH EVERY DAY 90 tablet 3    Multiple Vitamins-Minerals (CENTRUM SILVER 50+WOMEN) TABS Take by mouth      psyllium (METAMUCIL) 58.6 % packet Take 1 packet by mouth 2 (two) times a day 2 TSP DAILY       rosuvastatin (CRESTOR) 10 MG tablet TAKE 1 TABLET BY MOUTH EVERY DAY 90 tablet 3     No current facility-administered medications on file prior to visit.      Objective   /72   Pulse 69   Temp (!) 97.3 °F (36.3 °C)   Resp 16   Ht 5' (1.524 m)   Wt 67.1 kg (148 lb)   SpO2 99%   BMI 28.90 kg/m²     Neck Circumference: 13.4  Physical Exam    Constitutional: Alert, cooperative, no distress, appears stated age  Skin: Warm, dry, no rashes noted  Eyes: Conjunctiva/corneas clear  ENT: Nasal congestion absent, nares normal, septum midline, mucosa normal  Posterior Airspace:   Reich Tongue Position: 3-4  Retrognathia: present  Overbite: absent  High Arched Palate: absent  Tongue Scalloping/Ridging: present  Tonsils: absent   Uvula:  normal - only visualized on phonation  Lungs: Clear to auscultation bilaterally, respirations unlabored  Heart: normal rate and regular rhythm, S1/2 normal, no murmur noted, no rub or gallop  Extremities: Normal, no digital clubbing, trace pedal edema   Neuro: No focal deficits noted    Data  Lab Results   Component Value Date    HGB 13.8 01/03/2025    HCT 42.1 01/03/2025    MCV 93 01/03/2025      Lab Results   Component Value Date    GLUCOSE 91 09/16/2015    CALCIUM 9.9 01/03/2025     09/16/2015    K 3.4 (L) 01/03/2025    CO2 30 01/03/2025    CL 99 01/03/2025    BUN 24 01/03/2025    CREATININE 0.97 01/03/2025     Lab Results   Component Value Date    IRON 96 04/13/2023    TIBC 285 04/13/2023    FERRITIN 110 04/13/2023     Lab Results   Component Value Date    AST 27 01/03/2025    ALT 27 01/03/2025       Administrative Statements   I have spent a total time of 55 minutes in caring for this patient on the day of the  visit/encounter including Risks and benefits of tx options, Instructions for management, Patient and family education, Importance of tx compliance, Risk factor reductions, Impressions, Counseling / Coordination of care, Documenting in the medical record, Reviewing / ordering tests, medicine, procedures  , and Obtaining or reviewing history  .

## 2025-02-10 NOTE — ASSESSMENT & PLAN NOTE
I reviewed the recent test results with the patient.  We talked about the abnormal findings, including those consistent with Obstructive Sleep Apnea. We then discussed how the these are categorized as mild, moderate or severe.  We also covered the medical comorbidities associated with untreated Obstructive Sleep Apnea including, hypertension, cardiac arrythmia, myocardial infarction and stroke.  I explained how the risk of these conditions increases with the severity of the test results.  I also spoke in some detail about the most common treatment options including weight loss, nasal PAP, mandibular advancement devices and uvulopalatopharyngoplasty (UPPP).  I answered all questions posed to me and gave my opinion regarding the best options for treatment based on the patient and their level of disease.  She declines trial of APAP. She does not feel she is symptomatic and does not feel she could tolerate use of CPAP equipment.    We discussed that events were noted when she was sleeping in a supine position.  She does not feel she typically sleeps in a supine position and feels that it was due to having the sleep study equipment intact.  She was advised to consider a positional device to completely avoid supine sleep.  She plans to consider looking into devices such as Zzoma, a sleep noodle or other online.    Healthy diet and exercise were also discussed and encouraged.

## 2025-02-10 NOTE — PATIENT INSTRUCTIONS
Patient Instructions:     Recommend use of a positional device, such as Zzoma, sleep noodle or sleep bumper belt.  You could also try a wedge pillow or adjustable bed.  You would qualify for CPAP equipment  Avoid sleeping on your back  You could try an oral appliance, called a mandible advancement appliance.  These can be purchased online or made through your dentist.  Monitor for increase in snoring, increase in night time awakenings or night time urination.  Monitor for daytime sleepiness or inadvertent dozing.  If noticing any of these, further testing or use of CPAP would be strongly recommended.  Also continue to work on weight loss, which can improve sleep apnea.    Thank you for trusting me with your care!    I know we often  cover a lot of information at the visit, so if you have follow-up questions, are unclear about the plan, or feel there were important items that we did not discuss or you did not receive clarity on, please don't hesitate to reach out to me.     [employer] messages are preferred for routine matters.  Please make sure to call for urgent matters as there can be a delay in responding to questions over [employer].    IMPORTANT- Prior to a sleep study (at home or in the sleep lab), I strongly recommend contacting your medical insurance first to understand your benefits (including deductible if applicable), coverage for this test, and out of pocket costs.  Even if the test is approved by medical insurance, the cost to you is determined by your medical benefits.   If you have concerns about your out of pocket costs for sleep testing, please contact me/the office before you complete the test and we can discuss if there are alternate options.     I recommend following this advice in general before any lab test, imaging test, doctor visit, surgery, or ordering CPAP supplies as it is best to understand your coverage to avoid unexpected bills after the fact.       Nursing Support:  When: Monday through  Friday 7:30A-4:30PM except holidays  Where: Our direct line is [phone]  *3  *1.      If you are having a true emergency please call 911.  In the event that the line is busy or it is after hours please leave a voice message and we will return your call.  Please speak clearly, leaving your full name, birth date, best number to reach you and the reason for your call.   Medication refills: We will need the name of the medication, the dosage, the ordering provider, whether you get a 30 or 90 day refill, and the pharmacy name and address.  Medications will be ordered by the provider only.  Nurses cannot call in prescriptions.  Please allow 7 days for medication refills.  Physician requested updates: If your provider requested that you call with an update after starting medication, please be ready to provide us the medication and dosage, what time you take your medication, the time you attempt to fall asleep, time you fall asleep, when you wake up, and what time you get out of bed.  Sleep Study Results: We will contact you with sleep study results and/or next steps after the physician has reviewed your testing.

## 2025-02-10 NOTE — ASSESSMENT & PLAN NOTE
Hypertension - Blood pressure was well controlled today.  We reviewed the association between untreated obstructive sleep apnea and the increased risk for hypertension. Patient to continue on prescribed anti-hypertensive therapy (amlodipine, cozaar, metoprolol) and follow up with PCP for continuity of care.

## 2025-02-12 DIAGNOSIS — E78.2 MIXED HYPERLIPIDEMIA: ICD-10-CM

## 2025-02-12 RX ORDER — ROSUVASTATIN CALCIUM 10 MG/1
10 TABLET, COATED ORAL DAILY
Qty: 90 TABLET | Refills: 1 | Status: SHIPPED | OUTPATIENT
Start: 2025-02-12

## 2025-03-18 DIAGNOSIS — E83.42 HYPOMAGNESEMIA: ICD-10-CM

## 2025-03-18 DIAGNOSIS — N18.31 STAGE 3A CHRONIC KIDNEY DISEASE (HCC): ICD-10-CM

## 2025-03-19 RX ORDER — MAGNESIUM OXIDE 400 MG/1
1 TABLET ORAL DAILY
Qty: 120 TABLET | Refills: 1 | Status: SHIPPED | OUTPATIENT
Start: 2025-03-19

## 2025-04-30 DIAGNOSIS — I10 HYPERTENSION, UNSPECIFIED TYPE: ICD-10-CM

## 2025-04-30 RX ORDER — LOSARTAN POTASSIUM 50 MG/1
50 TABLET ORAL DAILY
Qty: 90 TABLET | Refills: 1 | Status: SHIPPED | OUTPATIENT
Start: 2025-04-30

## 2025-05-22 ENCOUNTER — RESULTS FOLLOW-UP (OUTPATIENT)
Dept: NEPHROLOGY | Facility: CLINIC | Age: 70
End: 2025-05-22

## 2025-05-22 ENCOUNTER — APPOINTMENT (OUTPATIENT)
Dept: LAB | Facility: HOSPITAL | Age: 70
End: 2025-05-22
Payer: MEDICARE

## 2025-05-22 DIAGNOSIS — E83.42 HYPOMAGNESEMIA: ICD-10-CM

## 2025-05-22 DIAGNOSIS — N18.31 STAGE 3A CHRONIC KIDNEY DISEASE (HCC): ICD-10-CM

## 2025-05-22 LAB
ALBUMIN SERPL BCG-MCNC: 4.7 G/DL (ref 3.5–5)
ALP SERPL-CCNC: 71 U/L (ref 34–104)
ALT SERPL W P-5'-P-CCNC: 24 U/L (ref 7–52)
ANION GAP SERPL CALCULATED.3IONS-SCNC: 7 MMOL/L (ref 4–13)
AST SERPL W P-5'-P-CCNC: 27 U/L (ref 13–39)
BACTERIA UR QL AUTO: ABNORMAL /HPF
BASOPHILS # BLD AUTO: 0.05 THOUSANDS/ÂΜL (ref 0–0.1)
BASOPHILS NFR BLD AUTO: 1 % (ref 0–1)
BILIRUB SERPL-MCNC: 0.8 MG/DL (ref 0.2–1)
BILIRUB UR QL STRIP: NEGATIVE
BUN SERPL-MCNC: 18 MG/DL (ref 5–25)
CALCIUM SERPL-MCNC: 9.7 MG/DL (ref 8.4–10.2)
CHLORIDE SERPL-SCNC: 99 MMOL/L (ref 96–108)
CLARITY UR: CLEAR
CO2 SERPL-SCNC: 32 MMOL/L (ref 21–32)
COLOR UR: ABNORMAL
CREAT SERPL-MCNC: 1.01 MG/DL (ref 0.6–1.3)
CREAT UR-MCNC: 67.2 MG/DL
EOSINOPHIL # BLD AUTO: 0.19 THOUSAND/ÂΜL (ref 0–0.61)
EOSINOPHIL NFR BLD AUTO: 3 % (ref 0–6)
ERYTHROCYTE [DISTWIDTH] IN BLOOD BY AUTOMATED COUNT: 11.9 % (ref 11.6–15.1)
GFR SERPL CREATININE-BSD FRML MDRD: 57 ML/MIN/1.73SQ M
GLUCOSE P FAST SERPL-MCNC: 101 MG/DL (ref 65–99)
GLUCOSE UR STRIP-MCNC: NEGATIVE MG/DL
HCT VFR BLD AUTO: 40.8 % (ref 34.8–46.1)
HGB BLD-MCNC: 13.3 G/DL (ref 11.5–15.4)
HGB UR QL STRIP.AUTO: NEGATIVE
IMM GRANULOCYTES # BLD AUTO: 0.03 THOUSAND/UL (ref 0–0.2)
IMM GRANULOCYTES NFR BLD AUTO: 1 % (ref 0–2)
KETONES UR STRIP-MCNC: NEGATIVE MG/DL
LEUKOCYTE ESTERASE UR QL STRIP: NEGATIVE
LYMPHOCYTES # BLD AUTO: 1.74 THOUSANDS/ÂΜL (ref 0.6–4.47)
LYMPHOCYTES NFR BLD AUTO: 27 % (ref 14–44)
MAGNESIUM SERPL-MCNC: 1.9 MG/DL (ref 1.9–2.7)
MCH RBC QN AUTO: 30.3 PG (ref 26.8–34.3)
MCHC RBC AUTO-ENTMCNC: 32.6 G/DL (ref 31.4–37.4)
MCV RBC AUTO: 93 FL (ref 82–98)
MICROALBUMIN UR-MCNC: <7 MG/L
MONOCYTES # BLD AUTO: 0.57 THOUSAND/ÂΜL (ref 0.17–1.22)
MONOCYTES NFR BLD AUTO: 9 % (ref 4–12)
NEUTROPHILS # BLD AUTO: 3.85 THOUSANDS/ÂΜL (ref 1.85–7.62)
NEUTS SEG NFR BLD AUTO: 59 % (ref 43–75)
NITRITE UR QL STRIP: NEGATIVE
NON-SQ EPI CELLS URNS QL MICRO: ABNORMAL /HPF
NRBC BLD AUTO-RTO: 0 /100 WBCS
PH UR STRIP.AUTO: 6 [PH]
PLATELET # BLD AUTO: 232 THOUSANDS/UL (ref 149–390)
PMV BLD AUTO: 9.3 FL (ref 8.9–12.7)
POTASSIUM SERPL-SCNC: 3.4 MMOL/L (ref 3.5–5.3)
PROT SERPL-MCNC: 6.7 G/DL (ref 6.4–8.4)
PROT UR STRIP-MCNC: NEGATIVE MG/DL
PTH-INTACT SERPL-MCNC: 68.4 PG/ML (ref 12–88)
RBC # BLD AUTO: 4.39 MILLION/UL (ref 3.81–5.12)
RBC #/AREA URNS AUTO: ABNORMAL /HPF
SODIUM SERPL-SCNC: 138 MMOL/L (ref 135–147)
SP GR UR STRIP.AUTO: 1.01 (ref 1–1.03)
UROBILINOGEN UR STRIP-ACNC: <2 MG/DL
WBC # BLD AUTO: 6.43 THOUSAND/UL (ref 4.31–10.16)
WBC #/AREA URNS AUTO: ABNORMAL /HPF

## 2025-05-22 PROCEDURE — 83970 ASSAY OF PARATHORMONE: CPT

## 2025-05-22 PROCEDURE — 36415 COLL VENOUS BLD VENIPUNCTURE: CPT

## 2025-05-22 PROCEDURE — 80053 COMPREHEN METABOLIC PANEL: CPT

## 2025-05-22 PROCEDURE — 85025 COMPLETE CBC W/AUTO DIFF WBC: CPT

## 2025-05-22 PROCEDURE — 82570 ASSAY OF URINE CREATININE: CPT

## 2025-05-22 PROCEDURE — 83735 ASSAY OF MAGNESIUM: CPT

## 2025-05-22 PROCEDURE — 81001 URINALYSIS AUTO W/SCOPE: CPT

## 2025-05-22 PROCEDURE — 82043 UR ALBUMIN QUANTITATIVE: CPT

## 2025-05-29 ENCOUNTER — OFFICE VISIT (OUTPATIENT)
Dept: NEPHROLOGY | Facility: CLINIC | Age: 70
End: 2025-05-29
Payer: MEDICARE

## 2025-05-29 VITALS
WEIGHT: 140.6 LBS | SYSTOLIC BLOOD PRESSURE: 124 MMHG | HEART RATE: 67 BPM | OXYGEN SATURATION: 99 % | BODY MASS INDEX: 27.46 KG/M2 | TEMPERATURE: 97.4 F | DIASTOLIC BLOOD PRESSURE: 64 MMHG

## 2025-05-29 DIAGNOSIS — N18.31 STAGE 3A CHRONIC KIDNEY DISEASE (HCC): Primary | ICD-10-CM

## 2025-05-29 DIAGNOSIS — I10 ESSENTIAL HYPERTENSION: ICD-10-CM

## 2025-05-29 DIAGNOSIS — E87.6 HYPOKALEMIA: ICD-10-CM

## 2025-05-29 PROCEDURE — 99214 OFFICE O/P EST MOD 30 MIN: CPT

## 2025-05-29 NOTE — ASSESSMENT & PLAN NOTE
Suspect to be diuretic related from HCTZ, not on potassium supplementation  Increase potassium in diet, consider potassium supplementation if it continues to remain low

## 2025-05-29 NOTE — PATIENT INSTRUCTIONS
It was nice seeing you today. Your kidney function is stable. Please follow up with us in 6 months. I have ordered lab work for you to get done before then. In the meantime, please avoid NSAIDs and have a healthy diet and exercise.  Keep on hydrating around 64 ounces daily. Have a high potassium diet.

## 2025-05-29 NOTE — PROGRESS NOTES
Name: Reshma Gunn      : 1955      MRN: [id number]  Encounter Provider: Gwyn Prasad PA-C  Encounter Date: 2025   Encounter department: [facility]  :  Assessment & Plan  Stage 3a chronic kidney disease (HCC)  Lab Results   Component Value Date    EGFR 57 2025    EGFR 60 2025    EGFR 61 2024    CREATININE 1.01 2025    CREATININE 0.97 2025    CREATININE 0.96 2024   Baseline creatinine 1.1 to 1.2 mg/dL  Etiology due to hypertensive nephrosclerosis  Current creatinine 1.01 mg/dL  UA with no evidence of albuminuria  Continue Crestor for hyperlipidemia  Orders:    CBC; Future    Comprehensive metabolic panel; Future    Magnesium; Future    Phosphorus; Future    Urinalysis with microscopic; Future    Albumin / creatinine urine ratio; Future    Hypokalemia  Suspect to be diuretic related from HCTZ, not on potassium supplementation  Increase potassium in diet, consider potassium supplementation if it continues to remain low       Essential hypertension  Patient currently on amlodipine 5 mg daily, HCTZ 25 mg daily, losartan 50 mg daily and metoprolol succinate 50 mg daily  BP here in the office and at home is well controlled, maybe at times too well controlled with readings in the low 100s           Patient Instructions   It was nice seeing you today. Your kidney function is stable. Please follow up with us in 6 months. I have ordered lab work for you to get done before then. In the meantime, please avoid NSAIDs and have a healthy diet and exercise.  Keep on hydrating around 64 ounces daily. Have a high potassium diet.     It was a pleasure evaluating your patient in the office today. Thank you for allowing our team to participate in the care of  Reshma Gunn. Please do not hesitate to contact our team if further issues/questions shall arise in the interim.     History of Present Illness   HPI  Reshma Gunn is a  69 y.o. female who presents for nephrology follow-up office visit.  She has not had any recent hospitalizations or ER visits.  She has been stable in the meantime.  History obtained from: patient  Pertinent Medical History     Review of Systems   Constitutional:  Negative for chills and fever.   Respiratory:  Negative for cough and shortness of breath.    Cardiovascular:  Negative for chest pain and leg swelling.   Gastrointestinal:  Negative for abdominal pain, nausea and vomiting.   Genitourinary:  Negative for dysuria and hematuria.   Neurological:  Negative for dizziness and headaches.     Medical History Reviewed by provider this encounter:  Tobacco  Allergies  Meds  Problems  Med Hx  Surg Hx  Fam Hx     .  Pertinent Medical History         Medical History Reviewed by provider this encounter:  Tobacco  Allergies  Meds  Problems  Med Hx  Surg Hx  Fam Hx     .     Medications Ordered Prior to Encounter[1]  Objective   /64 (BP Location: Left arm, Patient Position: Sitting)   Pulse 67   Temp (!) 97.4 °F (36.3 °C)   Wt 63.8 kg (140 lb 9.6 oz)   SpO2 99%   BMI 27.46 kg/m²      Physical Exam  Vitals and nursing note reviewed.   Constitutional:       General: She is not in acute distress.     Appearance: She is well-developed.   HENT:      Head: Normocephalic and atraumatic.     Cardiovascular:      Rate and Rhythm: Normal rate and regular rhythm.      Heart sounds: No murmur heard.  Pulmonary:      Effort: Pulmonary effort is normal. No respiratory distress.      Breath sounds: Normal breath sounds.   Abdominal:      Palpations: Abdomen is soft.      Tenderness: There is no abdominal tenderness.     Musculoskeletal:         General: No swelling.     Skin:     General: Skin is warm and dry.      Capillary Refill: Capillary refill takes less than 2 seconds.     Neurological:      Mental Status: She is alert.     Psychiatric:         Mood and Affect: Mood normal.           Laboratory Results:     "    Invalid input(s): \"ALBUMIN\"      Results for orders placed or performed in visit on 05/22/25   Albumin / creatinine urine ratio   Result Value Ref Range    Creatinine, Ur 67.2 Reference range not established. mg/dL    Albumin,U,Random <7.0 <20.0 mg/L    Albumin Creat Ratio     CBC and differential   Result Value Ref Range    WBC 6.43 4.31 - 10.16 Thousand/uL    RBC 4.39 3.81 - 5.12 Million/uL    Hemoglobin 13.3 11.5 - 15.4 g/dL    Hematocrit 40.8 34.8 - 46.1 %    MCV 93 82 - 98 fL    MCH 30.3 26.8 - 34.3 pg    MCHC 32.6 31.4 - 37.4 g/dL    RDW 11.9 11.6 - 15.1 %    MPV 9.3 8.9 - 12.7 fL    Platelets 232 149 - 390 Thousands/uL    nRBC 0 /100 WBCs    Segmented % 59 43 - 75 %    Immature Grans % 1 0 - 2 %    Lymphocytes % 27 14 - 44 %    Monocytes % 9 4 - 12 %    Eosinophils Relative 3 0 - 6 %    Basophils Relative 1 0 - 1 %    Absolute Neutrophils 3.85 1.85 - 7.62 Thousands/µL    Absolute Immature Grans 0.03 0.00 - 0.20 Thousand/uL    Absolute Lymphocytes 1.74 0.60 - 4.47 Thousands/µL    Absolute Monocytes 0.57 0.17 - 1.22 Thousand/µL    Eosinophils Absolute 0.19 0.00 - 0.61 Thousand/µL    Basophils Absolute 0.05 0.00 - 0.10 Thousands/µL   Comprehensive metabolic panel   Result Value Ref Range    Sodium 138 135 - 147 mmol/L    Potassium 3.4 (L) 3.5 - 5.3 mmol/L    Chloride 99 96 - 108 mmol/L    CO2 32 21 - 32 mmol/L    ANION GAP 7 4 - 13 mmol/L    BUN 18 5 - 25 mg/dL    Creatinine 1.01 0.60 - 1.30 mg/dL    Glucose, Fasting 101 (H) 65 - 99 mg/dL    Calcium 9.7 8.4 - 10.2 mg/dL    AST 27 13 - 39 U/L    ALT 24 7 - 52 U/L    Alkaline Phosphatase 71 34 - 104 U/L    Total Protein 6.7 6.4 - 8.4 g/dL    Albumin 4.7 3.5 - 5.0 g/dL    Total Bilirubin 0.80 0.20 - 1.00 mg/dL    eGFR 57 ml/min/1.73sq m   Magnesium   Result Value Ref Range    Magnesium 1.9 1.9 - 2.7 mg/dL   PTH, intact   Result Value Ref Range    PTH 68.4 12.0 - 88.0 pg/mL   Urinalysis with microscopic   Result Value Ref Range    Color, UA Light Yellow     " Clarity, UA Clear     Specific Gravity, UA 1.015 1.005 - 1.030    pH, UA 6.0 4.5, 5.0, 5.5, 6.0, 6.5, 7.0, 7.5, 8.0    Leukocytes, UA Negative Negative    Nitrite, UA Negative Negative    Protein, UA Negative Negative mg/dl    Glucose, UA Negative Negative mg/dl    Ketones, UA Negative Negative mg/dl    Urobilinogen, UA <2.0 <2.0 mg/dl mg/dl    Bilirubin, UA Negative Negative    Occult Blood, UA Negative Negative    RBC, UA None Seen None Seen, 2-4 /hpf    WBC, UA 0-1 (A) None Seen, 2-4, 5-60 /hpf    Epithelial Cells None Seen None Seen, Occasional /hpf    Bacteria, UA Occasional None Seen, Occasional /hpf       Administrative Statements   I have spent a total time of 30 minutes in caring for this patient on the day of the visit/encounter including Diagnostic results.         [1]   Current Outpatient Medications on File Prior to Visit   Medication Sig Dispense Refill    amLODIPine (NORVASC) 5 mg tablet TAKE 1 TABLET (5 MG TOTAL) BY MOUTH DAILY. 90 tablet 1    ascorbic acid (VITAMIN C) 250 MG tablet Take 500 mg by mouth in the morning and 500 mg in the evening.      Calcium Citrate-Vitamin D3 315-250 MG-UNIT TABS Take 1 tablet by mouth in the morning.      hydroCHLOROthiazide 25 mg tablet TAKE 1 TABLET (25 MG TOTAL) BY MOUTH DAILY. 90 tablet 1    ipratropium (ATROVENT) 0.03 % nasal spray SPRAY 2 SPRAYS INTO EACH NOSTRIL 3 TIMES A DAY 90 mL 2    LORazepam (ATIVAN) 0.5 mg tablet Take 1 tablet (0.5 mg total) by mouth every 8 (eight) hours as needed for anxiety 150 tablet 1    losartan (COZAAR) 50 mg tablet TAKE 1 TABLET BY MOUTH EVERY DAY 90 tablet 1    magnesium oxide (MAG-OX) 400 mg tablet TAKE 1 TABLET BY MOUTH EVERY  tablet 1    metoprolol succinate (TOPROL-XL) 50 mg 24 hr tablet TAKE 1 TABLET BY MOUTH EVERY DAY 90 tablet 3    Multiple Vitamins-Minerals (CENTRUM SILVER 50+WOMEN) TABS Take by mouth      psyllium (METAMUCIL) 58.6 % packet Take 1 packet by mouth in the morning and 1 packet in the evening. 2 TSP  DAILY .      rosuvastatin (CRESTOR) 10 MG tablet TAKE 1 TABLET BY MOUTH EVERY DAY 90 tablet 1     No current facility-administered medications on file prior to visit.

## 2025-06-10 ENCOUNTER — OFFICE VISIT (OUTPATIENT)
Dept: AUDIOLOGY | Facility: CLINIC | Age: 70
End: 2025-06-10

## 2025-06-10 DIAGNOSIS — H90.3 SENSORY HEARING LOSS, BILATERAL: Primary | ICD-10-CM

## 2025-06-10 NOTE — PROGRESS NOTES
Progress Note    Name:  Reshma Gunn  :  1955  Age:  69 y.o.  MRN:  [id number]  Date of Evaluation: 06/10/25     Patient picked up supplies.     Ivy Martel, CCC-A  Clinical Audiologist

## 2025-07-01 DIAGNOSIS — N18.32 STAGE 3B CHRONIC KIDNEY DISEASE (HCC): ICD-10-CM

## 2025-07-01 RX ORDER — AMLODIPINE BESYLATE 5 MG/1
5 TABLET ORAL DAILY
Qty: 90 TABLET | Refills: 1 | Status: SHIPPED | OUTPATIENT
Start: 2025-07-01

## 2025-07-10 ENCOUNTER — OFFICE VISIT (OUTPATIENT)
Dept: FAMILY MEDICINE CLINIC | Facility: CLINIC | Age: 70
End: 2025-07-10
Payer: MEDICARE

## 2025-07-10 VITALS
BODY MASS INDEX: 28.09 KG/M2 | OXYGEN SATURATION: 98 % | HEIGHT: 60 IN | HEART RATE: 64 BPM | DIASTOLIC BLOOD PRESSURE: 76 MMHG | TEMPERATURE: 98.1 F | WEIGHT: 143.1 LBS | SYSTOLIC BLOOD PRESSURE: 132 MMHG

## 2025-07-10 DIAGNOSIS — E87.6 HYPOKALEMIA: ICD-10-CM

## 2025-07-10 DIAGNOSIS — I10 ESSENTIAL HYPERTENSION, BENIGN: Primary | ICD-10-CM

## 2025-07-10 DIAGNOSIS — F41.1 GENERALIZED ANXIETY DISORDER: ICD-10-CM

## 2025-07-10 DIAGNOSIS — N18.32 STAGE 3B CHRONIC KIDNEY DISEASE (HCC): ICD-10-CM

## 2025-07-10 DIAGNOSIS — E78.2 MIXED HYPERLIPIDEMIA: ICD-10-CM

## 2025-07-10 DIAGNOSIS — Z00.00 MEDICARE ANNUAL WELLNESS VISIT, SUBSEQUENT: ICD-10-CM

## 2025-07-10 DIAGNOSIS — Z78.0 POST-MENOPAUSE: ICD-10-CM

## 2025-07-10 PROCEDURE — G0439 PPPS, SUBSEQ VISIT: HCPCS | Performed by: FAMILY MEDICINE

## 2025-07-10 PROCEDURE — 99214 OFFICE O/P EST MOD 30 MIN: CPT | Performed by: FAMILY MEDICINE

## 2025-07-10 PROCEDURE — G2211 COMPLEX E/M VISIT ADD ON: HCPCS | Performed by: FAMILY MEDICINE

## 2025-07-10 RX ORDER — HYDROCHLOROTHIAZIDE 25 MG/1
25 TABLET ORAL DAILY
Qty: 90 TABLET | Refills: 1 | Status: SHIPPED | OUTPATIENT
Start: 2025-07-10

## 2025-07-10 RX ORDER — LORAZEPAM 0.5 MG/1
0.5 TABLET ORAL EVERY 8 HOURS PRN
Qty: 150 TABLET | Refills: 1 | OUTPATIENT
Start: 2025-07-10

## 2025-07-10 RX ORDER — LORAZEPAM 0.5 MG/1
0.5 TABLET ORAL EVERY 8 HOURS PRN
Qty: 150 TABLET | Refills: 1 | Status: SHIPPED | OUTPATIENT
Start: 2025-07-10

## 2025-07-10 NOTE — ASSESSMENT & PLAN NOTE
I recommended a bone density for the patient.  She has an upcoming appointment to see her gynecologist.  She told me she will have her gynecologist ordered the mammogram for the patient.  I suggested she try to schedule the mammogram and bone density for the same day.  Orders:    DXA bone density spine hip and pelvis; Future

## 2025-07-10 NOTE — ASSESSMENT & PLAN NOTE
I ordered a CMP.  The patient has history of hypokalemia.  Her nephrologist did recommend that if she is unable to bring the potassium up through dietary modification that we should consider starting her on a potassium supplement.  I discussed this with the patient who described her diet with me in detail.  She is doing what she can do.  It is absolutely the diuretic causing the hypokalemia.  I will make further recommendations when I see her potassium.  Orders:    Comprehensive metabolic panel; Future

## 2025-07-10 NOTE — ASSESSMENT & PLAN NOTE
Patient will continue rosuvastatin 10 mg daily.  Her goal LDL cholesterol is at least less than 100.  Fasting lipid panel was ordered.  Orders:    Lipid Panel with Direct LDL reflex; Future    Comprehensive metabolic panel; Future

## 2025-07-10 NOTE — PROGRESS NOTES
Name: Reshma Gunn      : 1955      MRN: [id number]  Encounter Provider: Yamil Herbert DO  Encounter Date: 7/10/2025   Encounter department: [facility]  :  Assessment & Plan  Essential hypertension, benign  Patient has hypertension with a small component of whitecoat hypertension.  Today, I checked her blood pressure myself and found it to be well-controlled at 132/76.  I recommended no change with her current medication.         Generalized anxiety disorder  Patient requested refill for her lorazepam.  I queried the [place] prescription drug monitoring program.  There were no red flags and it was safe to proceed.  I refilled her prescription for lorazepam.  Orders:    LORazepam (ATIVAN) 0.5 mg tablet; Take 1 tablet (0.5 mg total) by mouth every 8 (eight) hours as needed for anxiety    Mixed hyperlipidemia  Patient will continue rosuvastatin 10 mg daily.  Her goal LDL cholesterol is at least less than 100.  Fasting lipid panel was ordered.  Orders:    Lipid Panel with Direct LDL reflex; Future    Comprehensive metabolic panel; Future    Hypokalemia  I ordered a CMP.  The patient has history of hypokalemia.  Her nephrologist did recommend that if she is unable to bring the potassium up through dietary modification that we should consider starting her on a potassium supplement.  I discussed this with the patient who described her diet with me in detail.  She is doing what she can do.  It is absolutely the diuretic causing the hypokalemia.  I will make further recommendations when I see her potassium.  Orders:    Comprehensive metabolic panel; Future    Post-menopause  I recommended a bone density for the patient.  She has an upcoming appointment to see her gynecologist.  She told me she will have her gynecologist ordered the mammogram for the patient.  I suggested she try to schedule the mammogram and bone density for the same day.  Orders:    DXA bone density spine hip and  pelvis; Future    Medicare annual wellness visit, subsequent            Preventive health issues were discussed with patient, and age appropriate screening tests were ordered as noted in patient's After Visit Summary. Personalized health advice and appropriate referrals for health education or preventive services given if needed, as noted in patient's After Visit Summary.    History of Present Illness     This is a 69-year-old white female who presents to the office today for her routine checkup as well as for her annual Medicare wellness exam.  The patient is doing well has no complaints.  She remains active.  He is trying to watch her diet.  She is compliant with her medication.  He continues to monitor her blood pressures at home and has found them to be well-controlled.       Patient Care Team:  Yamil Herbert DO as PCP - General  Kisha Colon DO (Nephrology)  Gwyn Prasad PA-C as Physician Assistant (Nephrology)    Review of Systems   Cardiovascular:  Negative for chest pain, palpitations and leg swelling.   Gastrointestinal:  Negative for abdominal distention, abdominal pain, blood in stool, constipation, diarrhea and nausea.   Genitourinary:  Negative for dysuria and frequency.   Musculoskeletal:  Negative for arthralgias.     Medical History Reviewed by provider this encounter:  [facility]       Annual Wellness Visit Questionnaire   Reshma is here for her Subsequent Wellness visit. Last Medicare Wellness visit information reviewed, patient interviewed and updates made to the record today.      Health Risk Assessment:   Patient rates overall health as good. Patient feels that their physical health rating is slightly better. Patient is very satisfied with their life. Eyesight was rated as same. Hearing was rated as same. Patient feels that their emotional and mental health rating is same. Patients states they are never, rarely angry. Patient states they are never, rarely unusually tired/fatigued. Pain  experienced in the last 7 days has been none. Patient states that she has experienced no weight loss or gain in last 6 months.     Depression Screening:   PHQ-2 Score: 0      Fall Risk Screening:   In the past year, patient has experienced: no history of falling in past year      Urinary Incontinence Screening:   Patient has not leaked urine accidently in the last six months.     Home Safety:  Patient does not have trouble with stairs inside or outside of their home. Patient has working smoke alarms and has working carbon monoxide detector. Home safety hazards include: none.     Nutrition:   Current diet is Regular and Limited junk food. Low salt    Medications:   Patient is currently taking over-the-counter supplements. OTC medications include: see medication list. Patient is able to manage medications.     Activities of Daily Living (ADLs)/Instrumental Activities of Daily Living (IADLs):   Walk and transfer into and out of bed and chair?: Yes  Dress and groom yourself?: Yes    Bathe or shower yourself?: Yes    Feed yourself? Yes  Do your laundry/housekeeping?: Yes  Manage your money, pay your bills and track your expenses?: Yes  Make your own meals?: Yes    Do your own shopping?: Yes    Durable Medical Equipment Suppliers  None    Previous Hospitalizations:   Any hospitalizations or ED visits within the last 12 months?: No      Advance Care Planning:   Living will: No    Durable POA for healthcare: No    Advanced directive: No      Cognitive Screening:   Provider or family/friend/caregiver concerned regarding cognition?: No    Preventive Screenings      Cardiovascular Screening:    General: Screening Not Indicated and History Lipid Disorder      Diabetes Screening:     General: Screening Current      Colorectal Cancer Screening:     General: Screening Current      Breast Cancer Screening:     General: Screening Current      Cervical Cancer Screening:    General: Screening Not Indicated      Osteoporosis  Screening:      Due for: DXA Axial      Abdominal Aortic Aneurysm (AAA) Screening:        General: Screening Not Indicated      Lung Cancer Screening:     General: Screening Not Indicated      Hepatitis C Screening:    General: Screening Current    Screening, Brief Intervention, and Referral to Treatment (SBIRT)     Screening  Typical number of drinks in a day: 0  Typical number of drinks in a week: 0  Interpretation: Low risk drinking behavior.    AUDIT-C Screenin) How often did you have a drink containing alcohol in the past year? never  2) How many drinks did you have on a typical day when you were drinking in the past year? 0  3) How often did you have 6 or more drinks on one occasion in the past year? never    AUDIT-C Score: 0  Interpretation: Score 0-2 (female): Negative screen for alcohol misuse    Single Item Drug Screening:  How often have you used an illegal drug (including marijuana) or a prescription medication for non-medical reasons in the past year? never    Single Item Drug Screen Score: 0  Interpretation: Negative screen for possible drug use disorder    Social Drivers of Health     Financial Resource Strain: Low Risk  (2023)    Overall Financial Resource Strain (CARDIA)     Difficulty of Paying Living Expenses: Not hard at all   Food Insecurity: No Food Insecurity (2025)    Nursing - Inadequate Food Risk Classification     Worried About Running Out of Food in the Last Year: Never true     Ran Out of Food in the Last Year: Never true   Transportation Needs: No Transportation Needs (2025)    PRAPARE - Transportation     Lack of Transportation (Medical): No     Lack of Transportation (Non-Medical): No   Housing Stability: Low Risk  (2025)    Housing Stability Vital Sign     Unable to Pay for Housing in the Last Year: No     Number of Times Moved in the Last Year: 0     Homeless in the Last Year: No   Utilities: Not At Risk (2025)    [facility] Utilities     Threatened with loss of  utilities: No     No results found.    Objective   /76 (BP Location: Left arm, Patient Position: Sitting)   Pulse 64   Temp 98.1 °F (36.7 °C) (Temporal)   Ht 5' (1.524 m)   Wt 64.9 kg (143 lb 1.6 oz)   SpO2 98%   BMI 27.95 kg/m²     Physical Exam  Vitals reviewed.   Constitutional:       Comments: Patient is a 69-year-old white female who appears her stated age.  She is pleasant, cooperative, and in no distress.   HENT:      Head: Normocephalic and atraumatic.      Right Ear: Tympanic membrane, ear canal and external ear normal. There is no impacted cerumen.      Left Ear: Tympanic membrane, ear canal and external ear normal. There is no impacted cerumen.      Mouth/Throat:      Mouth: Mucous membranes are moist.      Pharynx: Oropharynx is clear. No oropharyngeal exudate or posterior oropharyngeal erythema.     Eyes:      General: No scleral icterus.        Right eye: No discharge.         Left eye: No discharge.      Conjunctiva/sclera: Conjunctivae normal.      Pupils: Pupils are equal, round, and reactive to light.       Cardiovascular:      Rate and Rhythm: Normal rate and regular rhythm.      Heart sounds: Normal heart sounds. No murmur heard.     No friction rub. No gallop.   Pulmonary:      Effort: Pulmonary effort is normal. No respiratory distress.      Breath sounds: Normal breath sounds. No stridor. No wheezing, rhonchi or rales.   Abdominal:      General: There is no distension.      Palpations: Abdomen is soft. There is no mass.      Tenderness: There is no abdominal tenderness. There is no guarding.      Comments: No hepatosplenomegaly     Musculoskeletal:      Cervical back: Neck supple.   Lymphadenopathy:      Cervical: No cervical adenopathy.     Psychiatric:         Mood and Affect: Mood normal.         Behavior: Behavior normal.         Thought Content: Thought content normal.         Judgment: Judgment normal.     Extremities: Without cyanosis, clubbing, or edema

## 2025-07-10 NOTE — ASSESSMENT & PLAN NOTE
Patient requested refill for her lorazepam.  I queried the [place] prescription drug monitoring program.  There were no red flags and it was safe to proceed.  I refilled her prescription for lorazepam.  Orders:    LORazepam (ATIVAN) 0.5 mg tablet; Take 1 tablet (0.5 mg total) by mouth every 8 (eight) hours as needed for anxiety

## 2025-07-10 NOTE — PATIENT INSTRUCTIONS
Medicare Preventive Visit Patient Instructions  Thank you for completing your Welcome to Medicare Visit or Medicare Annual Wellness Visit today. Your next wellness visit will be due in one year (7/11/2026).  The screening/preventive services that you may require over the next 5-10 years are detailed below. Some tests may not apply to you based off risk factors and/or age. Screening tests ordered at today's visit but not completed yet may show as past due. Also, please note that scanned in results may not display below.  Preventive Screenings:  Service Recommendations Previous Testing/Comments   Colorectal Cancer Screening  * Colonoscopy    * Fecal Occult Blood Test (FOBT)/Fecal Immunochemical Test (FIT)  * Fecal DNA/Cologuard Test  * Flexible Sigmoidoscopy Age: 45-75 years old   Colonoscopy: every 10 years (may be performed more frequently if at higher risk)  OR  FOBT/FIT: every 1 year  OR  Cologuard: every 3 years  OR  Sigmoidoscopy: every 5 years  Screening may be recommended earlier than age 45 if at higher risk for colorectal cancer. Also, an individualized decision between you and your healthcare provider will decide whether screening between the ages of 76-85 would be appropriate. Colonoscopy: 06/06/2017  FOBT/FIT: Not on file  Cologuard: Not on file  Sigmoidoscopy: Not on file    Screening Current     Breast Cancer Screening Age: 40+ years old  Frequency: every 1-2 years  Not required if history of left and right mastectomy Mammogram: 10/23/2024    Screening Current   Cervical Cancer Screening Between the ages of 21-29, pap smear recommended once every 3 years.   Between the ages of 30-65, can perform pap smear with HPV co-testing every 5 years.   Recommendations may differ for women with a history of total hysterectomy, cervical cancer, or abnormal pap smears in past. Pap Smear: 11/05/2020    Screening Not Indicated   Hepatitis C Screening Once for adults born between 1945 and 1965  More frequently in  patients at high risk for Hepatitis C Hep C Antibody: 08/19/2020    Screening Current   Diabetes Screening 1-2 times per year if you're at risk for diabetes or have pre-diabetes Fasting glucose: 101 mg/dL (5/22/2025)  A1C: No results in last 5 years (No results in last 5 years)  Screening Current   Cholesterol Screening Once every 5 years if you don't have a lipid disorder. May order more often based on risk factors. Lipid panel: 07/06/2024    Screening Not Indicated  History Lipid Disorder     Other Preventive Screenings Covered by Medicare:  Abdominal Aortic Aneurysm (AAA) Screening: covered once if your at risk. You're considered to be at risk if you have a family history of AAA.  Lung Cancer Screening: covers low dose CT scan once per year if you meet all of the following conditions: (1) Age 55-77; (2) No signs or symptoms of lung cancer; (3) Current smoker or have quit smoking within the last 15 years; (4) You have a tobacco smoking history of at least 20 pack years (packs per day multiplied by number of years you smoked); (5) You get a written order from a healthcare provider.  Glaucoma Screening: covered annually if you're considered high risk: (1) You have diabetes OR (2) Family history of glaucoma OR (3)  aged 50 and older OR (4)  American aged 65 and older  Osteoporosis Screening: covered every 2 years if you meet one of the following conditions: (1) You're estrogen deficient and at risk for osteoporosis based off medical history and other findings; (2) Have a vertebral abnormality; (3) On glucocorticoid therapy for more than 3 months; (4) Have primary hyperparathyroidism; (5) On osteoporosis medications and need to assess response to drug therapy.   Last bone density test (DXA Scan): 06/03/2021.  HIV Screening: covered annually if you're between the age of 15-65. Also covered annually if you are younger than 15 and older than 65 with risk factors for HIV infection. For pregnant  patients, it is covered up to 3 times per pregnancy.    Immunizations:  Immunization Recommendations   Influenza Vaccine Annual influenza vaccination during flu season is recommended for all persons aged >= 6 months who do not have contraindications   Pneumococcal Vaccine   * Pneumococcal conjugate vaccine = PCV13 (Prevnar 13), PCV15 (Vaxneuvance), PCV20 (Prevnar 20)  * Pneumococcal polysaccharide vaccine = PPSV23 (Pneumovax) Adults 19-65 yo with certain risk factors or if 65+ yo  If never received any pneumonia vaccine: recommend Prevnar 20 (PCV20)  Give PCV20 if previously received 1 dose of PCV13 or PPSV23   Hepatitis B Vaccine 3 dose series if at intermediate or high risk (ex: diabetes, end stage renal disease, liver disease)   Respiratory syncytial virus (RSV) Vaccine - COVERED BY MEDICARE PART D  * RSVPreF3 (Arexvy) CDC recommends that adults 60 years of age and older may receive a single dose of RSV vaccine using shared clinical decision-making (SCDM)   Tetanus (Td) Vaccine - COST NOT COVERED BY MEDICARE PART B Following completion of primary series, a booster dose should be given every 10 years to maintain immunity against tetanus. Td may also be given as tetanus wound prophylaxis.   Tdap Vaccine - COST NOT COVERED BY MEDICARE PART B Recommended at least once for all adults. For pregnant patients, recommended with each pregnancy.   Shingles Vaccine (Shingrix) - COST NOT COVERED BY MEDICARE PART B  2 shot series recommended in those 19 years and older who have or will have weakened immune systems or those 50 years and older     Health Maintenance Due:      Topic Date Due   • Breast Cancer Screening: Mammogram  10/23/2025   • Colorectal Cancer Screening  06/06/2027   • Hepatitis C Screening  Completed     Immunizations Due:      Topic Date Due   • COVID-19 Vaccine (6 - 2024-25 season) 09/01/2024   • Influenza Vaccine (1) 09/01/2025     Advance Directives   What are advance directives?  Advance directives are  legal documents that state your wishes and plans for medical care. These plans are made ahead of time in case you lose your ability to make decisions for yourself. Advance directives can apply to any medical decision, such as the treatments you want, and if you want to donate organs.   What are the types of advance directives?  There are many types of advance directives, and each state has rules about how to use them. You may choose a combination of any of the following:  Living will:  This is a written record of the treatment you want. You can also choose which treatments you do not want, which to limit, and which to stop at a certain time. This includes surgery, medicine, IV fluid, and tube feedings.   Durable power of  for healthcare (DPAHC):  This is a written record that states who you want to make healthcare choices for you when you are unable to make them for yourself. This person, called a proxy, is usually a family member or a friend. You may choose more than 1 proxy.  Do not resuscitate (DNR) order:  A DNR order is used in case your heart stops beating or you stop breathing. It is a request not to have certain forms of treatment, such as CPR. A DNR order may be included in other types of advance directives.  Medical directive:  This covers the care that you want if you are in a coma, near death, or unable to make decisions for yourself. You can list the treatments you want for each condition. Treatment may include pain medicine, surgery, blood transfusions, dialysis, IV or tube feedings, and a ventilator (breathing machine).  Values history:  This document has questions about your views, beliefs, and how you feel and think about life. This information can help others choose the care that you would choose.  Why are advance directives important?  An advance directive helps you control your care. Although spoken wishes may be used, it is better to have your wishes written down. Spoken wishes can be  misunderstood, or not followed. Treatments may be given even if you do not want them. An advance directive may make it easier for your family to make difficult choices about your care.   Weight Management   Why it is important to manage your weight:  Being overweight increases your risk of health conditions such as heart disease, high blood pressure, type 2 diabetes, and certain types of cancer. It can also increase your risk for osteoarthritis, sleep apnea, and other respiratory problems. Aim for a slow, steady weight loss. Even a small amount of weight loss can lower your risk of health problems.  How to lose weight safely:  A safe and healthy way to lose weight is to eat fewer calories and get regular exercise. You can lose up about 1 pound a week by decreasing the number of calories you eat by 500 calories each day.   Healthy meal plan for weight management:  A healthy meal plan includes a variety of foods, contains fewer calories, and helps you stay healthy. A healthy meal plan includes the following:  Eat whole-grain foods more often.  A healthy meal plan should contain fiber. Fiber is the part of grains, fruits, and vegetables that is not broken down by your body. Whole-grain foods are healthy and provide extra fiber in your diet. Some examples of whole-grain foods are whole-wheat breads and pastas, oatmeal, brown rice, and bulgur.  Eat a variety of vegetables every day.  Include dark, leafy greens such as spinach, kale, megan greens, and mustard greens. Eat yellow and orange vegetables such as carrots, sweet potatoes, and winter squash.   Eat a variety of fruits every day.  Choose fresh or canned fruit (canned in its own juice or light syrup) instead of juice. Fruit juice has very little or no fiber.  Eat low-fat dairy foods.  Drink fat-free (skim) milk or 1% milk. Eat fat-free yogurt and low-fat cottage cheese. Try low-fat cheeses such as mozzarella and other reduced-fat cheeses.  Choose meat and other  protein foods that are low in fat.  Choose beans or other legumes such as split peas or lentils. Choose fish, skinless poultry (chicken or turkey), or lean cuts of red meat (beef or pork). Before you cook meat or poultry, cut off any visible fat.   Use less fat and oil.  Try baking foods instead of frying them. Add less fat, such as margarine, sour cream, regular salad dressing and mayonnaise to foods. Eat fewer high-fat foods. Some examples of high-fat foods include french fries, doughnuts, ice cream, and cakes.  Eat fewer sweets.  Limit foods and drinks that are high in sugar. This includes candy, cookies, regular soda, and sweetened drinks.  Exercise:  Exercise at least 30 minutes per day on most days of the week. Some examples of exercise include walking, biking, dancing, and swimming. You can also fit in more physical activity by taking the stairs instead of the elevator or parking farther away from stores. Ask your healthcare provider about the best exercise plan for you.    © Copyright [employer] 2018 Information is for End User's use only and may not be sold, redistributed or otherwise used for commercial purposes. All illustrations and images included in CareNotes® are the copyrighted property of [employer]M., Inc. or [employer]

## 2025-07-11 NOTE — ASSESSMENT & PLAN NOTE
Patient has hypertension with a small component of whitecoat hypertension.  Today, I checked her blood pressure myself and found it to be well-controlled at 132/76.  I recommended no change with her current medication.

## 2025-08-01 DIAGNOSIS — E78.2 MIXED HYPERLIPIDEMIA: ICD-10-CM

## 2025-08-01 RX ORDER — ROSUVASTATIN CALCIUM 10 MG/1
10 TABLET, COATED ORAL DAILY
Qty: 30 TABLET | Refills: 0 | Status: SHIPPED | OUTPATIENT
Start: 2025-08-01

## 2025-08-09 PROBLEM — Z00.00 MEDICARE ANNUAL WELLNESS VISIT, SUBSEQUENT: Status: RESOLVED | Noted: 2023-04-03 | Resolved: 2025-08-09
